# Patient Record
Sex: FEMALE | Race: BLACK OR AFRICAN AMERICAN | NOT HISPANIC OR LATINO | Employment: FULL TIME | URBAN - METROPOLITAN AREA
[De-identification: names, ages, dates, MRNs, and addresses within clinical notes are randomized per-mention and may not be internally consistent; named-entity substitution may affect disease eponyms.]

---

## 2023-01-05 ENCOUNTER — OFFICE VISIT (OUTPATIENT)
Dept: OBSTETRICS AND GYNECOLOGY | Facility: CLINIC | Age: 23
End: 2023-01-05
Payer: COMMERCIAL

## 2023-01-05 VITALS
WEIGHT: 164 LBS | HEIGHT: 68 IN | BODY MASS INDEX: 24.86 KG/M2 | SYSTOLIC BLOOD PRESSURE: 110 MMHG | DIASTOLIC BLOOD PRESSURE: 67 MMHG

## 2023-01-05 DIAGNOSIS — Z30.09 BIRTH CONTROL COUNSELING: ICD-10-CM

## 2023-01-05 DIAGNOSIS — Z20.2 POSSIBLE EXPOSURE TO STD: Primary | ICD-10-CM

## 2023-01-05 PROCEDURE — 99202 OFFICE O/P NEW SF 15 MIN: CPT | Performed by: OBSTETRICS & GYNECOLOGY

## 2023-01-05 NOTE — PROGRESS NOTES
AMBER Andres  is a 22 y.o. female who presents for 2 reasons.  First, she would like to be tested for sexually transmitted infections.  She is not symptomatic, but is sexually active.  Next, she has a Nexplanon in place.  It is 3 years old and the patient would like to discuss its removal.    Chief Complaint   Patient presents with   • New Gyn     Patient is here for a new gyn annual recheck nexplanon and STI screening.       History reviewed. No pertinent past medical history.    Past Surgical History:   Procedure Laterality Date   • SPINE SURGERY         Social History     Socioeconomic History   • Marital status: Single   Tobacco Use   • Smoking status: Never   • Smokeless tobacco: Never   Vaping Use   • Vaping Use: Never used   Substance and Sexual Activity   • Alcohol use: Not Currently   • Drug use: Never   • Sexual activity: Not Currently       The following portions of the patient's history were reviewed and updated as appropriate: allergies, current medications, past family history, past medical history, past social history, past surgical history and problem list.    Review of Systems      This is positive for irregular cycles.  It is negative for fever or chills.  Negative for nausea or vomiting.  Negative for abdominal or pelvic pain.    Physical Exam  Vitals and nursing note reviewed.   Constitutional:       Appearance: Normal appearance.   Abdominal:      General: There is no distension.      Palpations: Abdomen is soft.      Tenderness: There is no abdominal tenderness.   Genitourinary:     Comments: Normal female external genitalia  There are no vaginal lesions.  There is menstrual blood in the vault  The cervix is normal in appearance.  It is not tender to palpation.  Cultures performed.  The uterus is small and mobile within the pelvis.  It is nontender.  No adnexal masses are palpable  Neurological:      Mental Status: She is alert and oriented to person, place, and time.          Assessment    Diagnoses and all orders for this visit:    1. Possible exposure to STD (Primary)    2. Birth control counseling        Plan  1. Contraceptive counseling.  Once the patient's Nexplanon is removed, she would like to use condoms.  We discussed the benefits and risks of this.  Safe sex counseling was also undertaken.  2. Possible exposure to STDs.  Counseled and questions answered.  Cultures for gonorrhea, chlamydia and trichomonas were performed.  The patient declines blood work for syphilis or HIV.  3. The patient would like her Nexplanon to be removed.  I examined the patient.  Her Nexplanon is in good position in the right upper arm.  We discussed the procedure of Nexplanon removal and I answered the patient's questions.  She would like to proceed.  She will schedule this in the near future.    4. No follow-ups on file.    Social History     Tobacco Use   Smoking Status Never   Smokeless Tobacco Never   5.

## 2023-01-07 LAB
A VAGINAE DNA VAG QL NAA+PROBE: NORMAL SCORE
BVAB2 DNA VAG QL NAA+PROBE: NORMAL SCORE
C ALBICANS DNA VAG QL NAA+PROBE: NEGATIVE
C GLABRATA DNA VAG QL NAA+PROBE: NEGATIVE
C TRACH DNA VAG QL NAA+PROBE: NEGATIVE
MEGA1 DNA VAG QL NAA+PROBE: NORMAL SCORE
N GONORRHOEA DNA VAG QL NAA+PROBE: NEGATIVE
T VAGINALIS DNA VAG QL NAA+PROBE: NEGATIVE

## 2023-01-09 ENCOUNTER — PATIENT ROUNDING (BHMG ONLY) (OUTPATIENT)
Dept: OBSTETRICS AND GYNECOLOGY | Facility: CLINIC | Age: 23
End: 2023-01-09
Payer: COMMERCIAL

## 2023-01-09 ENCOUNTER — PATIENT MESSAGE (OUTPATIENT)
Dept: OBSTETRICS AND GYNECOLOGY | Facility: CLINIC | Age: 23
End: 2023-01-09
Payer: COMMERCIAL

## 2023-01-09 NOTE — PROGRESS NOTES
My chart message has been sent to the patient for [place] [facility]

## 2023-01-12 ENCOUNTER — PROCEDURE VISIT (OUTPATIENT)
Dept: OBSTETRICS AND GYNECOLOGY | Facility: CLINIC | Age: 23
End: 2023-01-12
Payer: COMMERCIAL

## 2023-01-12 VITALS
WEIGHT: 164 LBS | SYSTOLIC BLOOD PRESSURE: 110 MMHG | HEIGHT: 68 IN | BODY MASS INDEX: 24.86 KG/M2 | DIASTOLIC BLOOD PRESSURE: 64 MMHG

## 2023-01-12 DIAGNOSIS — Z30.46 NEXPLANON REMOVAL: ICD-10-CM

## 2023-01-12 PROCEDURE — 11982 REMOVE DRUG IMPLANT DEVICE: CPT | Performed by: OBSTETRICS & GYNECOLOGY

## 2023-01-12 NOTE — PROGRESS NOTES
AMBER Andres  is a 22 y.o. female who presents for removal of a Nexplanon.  She has been counseled regarding the procedure itself as well as its benefits, risks and alternatives.  She would like to proceed.    Chief Complaint   Patient presents with   • Contraception     Patient is here for nexplanon removal.       History reviewed. No pertinent past medical history.    Past Surgical History:   Procedure Laterality Date   • SPINE SURGERY         Social History     Socioeconomic History   • Marital status: Single   Tobacco Use   • Smoking status: Never   • Smokeless tobacco: Never   Vaping Use   • Vaping Use: Never used   Substance and Sexual Activity   • Alcohol use: Not Currently   • Drug use: Never   • Sexual activity: Not Currently       The following portions of the patient's history were reviewed and updated as appropriate: allergies, current medications, past family history, past medical history, past social history, past surgical history and problem list.    Review of Systems          Physical Exam  Vitals and nursing note reviewed.   Constitutional:       Appearance: Normal appearance.   Skin:     Comments: Nexplanon was removed from the patient's right upper arm.  Xylocaine was used for local anesthetic.  Sterile technique was observed.  An incision was made overlying the distal end of the Nexplanon.  A hemostat was advanced and the Nexplanon was grasped.  It was removed.  The Nexplanon was examined.  It was completely intact.  A pressure dressing was applied.  The patient tolerated the procedure well.   Neurological:      Mental Status: She is alert and oriented to person, place, and time.         Assessment    Diagnoses and all orders for this visit:    1. Nexplanon removal        Plan  1. Nexplanon was removed as described above.  2. The patient has a job which requires heavy lifting.  I advised her to be off work today.  3. The patient plans to use condoms if she is sexually active in the  future.    4. No follow-ups on file.    Social History     Tobacco Use   Smoking Status Never   Smokeless Tobacco Never   5.

## 2023-06-14 ENCOUNTER — TELEPHONE (OUTPATIENT)
Dept: OBSTETRICS AND GYNECOLOGY | Facility: CLINIC | Age: 23
End: 2023-06-14
Payer: COMMERCIAL

## 2023-09-26 ENCOUNTER — TELEPHONE (OUTPATIENT)
Dept: OBSTETRICS AND GYNECOLOGY | Facility: CLINIC | Age: 23
End: 2023-09-26
Payer: COMMERCIAL

## 2023-09-26 NOTE — TELEPHONE ENCOUNTER
Please contact the patient and let her know that I reviewed her chart.  Usually, a hemorrhagic cyst that ruptures brings about resolution of the pain almost immediately.  If she is still in pain, there could be a different diagnosis.  Please help the patient to schedule an appointment soon so we can assess this.  If her pain is severe, I would recommend that she come to the emergency room.

## 2023-09-26 NOTE — TELEPHONE ENCOUNTER
Provider: DR. BURNETT    Caller: JORDON DODGE    Relationship to Patient: SELF    Pharmacy: [facility] @ [address]    Phone Number: [phone]    Reason for Call: PT WENT TO [facility] ON 9-24 FOR AB PAIN. NOTES ARE IN CARE EVERYWHERE, THEY DID AN U/S AND A C/T AND ADV PT THAT SHE HAD HAD A RUPTURED OVARIAN CYST. SHE IS STILL IN PAIN 5 OUT OF 10 BUT ESPECIALLY WHEN SHE MOVES. CAN SOMEONE CALL HER ON PLAN OF CARE?    When was the patient last seen: 07-19-23

## 2023-09-29 ENCOUNTER — OFFICE VISIT (OUTPATIENT)
Dept: OBSTETRICS AND GYNECOLOGY | Facility: CLINIC | Age: 23
End: 2023-09-29
Payer: COMMERCIAL

## 2023-09-29 VITALS
SYSTOLIC BLOOD PRESSURE: 118 MMHG | HEIGHT: 68 IN | WEIGHT: 177 LBS | DIASTOLIC BLOOD PRESSURE: 67 MMHG | BODY MASS INDEX: 26.83 KG/M2

## 2023-09-29 DIAGNOSIS — N83.209 RUPTURED OVARIAN CYST: Primary | ICD-10-CM

## 2023-09-29 NOTE — PROGRESS NOTES
AMBER Andres  is a 23 y.o. female who presents for follow-up of a ruptured ovarian cyst.  The patient is not currently using any form of contraception.  Last week, she experienced pelvic and suprapubic pain.  It eventually became unbearable and she presented to the emergency room.  I have reviewed the emergency room record and discussed it with the patient.  Hemoglobin and white cell count were normal.  Ultrasound and CT scan were performed.  These imaging studies revealed a ruptured cyst of the ovary with no other pathology encountered.  Since the patient's emergency room visit, she has complete resolution of her pain.  She is sexually active and is not using contraception.  She does not wish to become pregnant.  Her pregnancy test in the emergency room was negative.    Chief Complaint   Patient presents with    Follow-up     Patient is here for a f/u for a ruptured cyst.       History reviewed. No pertinent past medical history.    Past Surgical History:   Procedure Laterality Date    SPINE SURGERY         Social History     Socioeconomic History    Marital status: Single   Tobacco Use    Smoking status: Never    Smokeless tobacco: Never   Vaping Use    Vaping Use: Never used   Substance and Sexual Activity    Alcohol use: Not Currently    Drug use: Never    Sexual activity: Not Currently       The following portions of the patient's history were reviewed and updated as appropriate: allergies, current medications, past family history, past medical history, past social history, past surgical history and problem list.    Review of Systems     This is positive for abdominal and pelvic pain, now resolved.  Negative for nausea or vomiting.  Negative for fever or chills.    Physical Exam  Vitals and nursing note reviewed.   Constitutional:       Appearance: Normal appearance.   Abdominal:      General: There is no distension.      Palpations: Abdomen is soft. There is no mass.      Tenderness: There is no  abdominal tenderness.   Genitourinary:     Comments: Normal female external genitalia  The vagina is estrogenized and without lesion.  There is no blood in the vaginal vault.  The cervix is normal in appearance.  There is no cervical motion tenderness.  The uterus is mobile within the pelvis.  It is normal in size.  It is nontender.  No adnexal tenderness is palpable  Neurological:      Mental Status: She is alert and oriented to person, place, and time.       Assessment    Diagnoses and all orders for this visit:    1. Ruptured ovarian cyst (Primary)        Plan  I counseled the patient regarding the pathophysiology of a ruptured ovarian cyst.  We reviewed a diagram together and I answered the patient's questions.  It appears that her ruptured ovarian cyst has resolved.  We discussed the benefits and risks of suppressing the ovary with oral contraceptive pills.  I explained my rationale for recommending this as a form of ovarian suppression as well as contraception.  The patient declines this for now, but will consider.  She will call me if she would like to begin treatment.  She understands that she is at risk of becoming pregnant and prefers not to do so.  30 minutes were spent reviewing the patient's emergency room record and counseling her regarding findings and options for management.    No follow-ups on file.    Social History     Tobacco Use   Smoking Status Never   Smokeless Tobacco Never

## 2023-10-30 ENCOUNTER — INITIAL PRENATAL (OUTPATIENT)
Dept: OBSTETRICS AND GYNECOLOGY | Facility: CLINIC | Age: 23
End: 2023-10-30
Payer: COMMERCIAL

## 2023-10-30 VITALS — WEIGHT: 178.2 LBS | DIASTOLIC BLOOD PRESSURE: 90 MMHG | SYSTOLIC BLOOD PRESSURE: 136 MMHG | BODY MASS INDEX: 27.1 KG/M2

## 2023-10-30 DIAGNOSIS — Z3A.01 LESS THAN 8 WEEKS GESTATION OF PREGNANCY: Primary | ICD-10-CM

## 2023-10-30 LAB
B-HCG UR QL: POSITIVE
EXPIRATION DATE: ABNORMAL
EXTERNAL HEPATITIS B SURFACE ANTIGEN: NEGATIVE
EXTERNAL HEPATITIS C AB: NON REACTIVE
EXTERNAL RUBELLA QUALITATIVE: NORMAL
EXTERNAL SYPHILIS RPR SCREEN: NORMAL
GLUCOSE UR STRIP-MCNC: NEGATIVE MG/DL
HIV1 P24 AG SERPL QL IA: NORMAL
INTERNAL NEGATIVE CONTROL: ABNORMAL
INTERNAL POSITIVE CONTROL: ABNORMAL
Lab: ABNORMAL
PROT UR STRIP-MCNC: ABNORMAL MG/DL

## 2023-10-30 RX ORDER — PRENATAL VIT NO.126/IRON/FOLIC 28MG-0.8MG
TABLET ORAL DAILY
COMMUNITY

## 2023-10-30 RX ORDER — DIPHENHYDRAMINE HYDROCHLORIDE 25 MG/1
25 CAPSULE ORAL 3 TIMES DAILY
Qty: 90 TABLET | Refills: 3 | Status: SHIPPED | OUTPATIENT
Start: 2023-10-30

## 2023-10-30 NOTE — PROGRESS NOTES
CC: Initial obstetric visit    HPI: 23-year-old  1 para 0 presents at 8-0/7 weeks by today's ultrasound and confirmed by her last menstrual cycle for her initial obstetric visit.  She has some nausea and occasional episodes of vomiting, but is otherwise feeling well.  Her history is significant for Garcia maryanne placement at age 18.  Also, the patient has been using Dupixent for psoriasis.    Review of systems    This is positive for nausea.  Negative for fever or chills.  Negative for abdominal or pelvic pain.  Negative for vaginal bleeding.  All other systems are reviewed and are negative.    Assessment and plan    1.  Intrauterine pregnancy at 8-0/7 weeks  2.  Prenatal counseling was undertaken and questions were answered.  3.  Counseled regarding ACOG recommendations for the influenza vaccine in pregnancy.  The patient would like to proceed today.  4.  Counseled regarding genetic screening.  The patient would like to do noninvasive prenatal screening when it is gestational age-appropriate.  5.  Garcia maryanne placement as a teenager.  While I doubt that there will be any limitations, I recommend that the patient follow-up with the spine center for further recommendations.  The patient plans to do so.  6.  Counseled regarding benefits and risks of continuing Dupixent during pregnancy.  For now, she has discontinued it for the last 4 weeks.  She will follow-up with dermatology to determine if any psoriasis treatment will be required during her pregnancy.

## 2023-10-31 LAB
ABO GROUP BLD: ABNORMAL
AMPHETAMINES UR QL SCN: NEGATIVE NG/ML
BARBITURATES UR QL SCN: NEGATIVE NG/ML
BASOPHILS # BLD AUTO: 0 X10E3/UL (ref 0–0.2)
BASOPHILS NFR BLD AUTO: 0 %
BENZODIAZ UR QL: NEGATIVE NG/ML
BLD GP AB SCN SERPL QL: NEGATIVE
BZE UR QL: NEGATIVE NG/ML
CANNABINOIDS UR QL SCN: NEGATIVE NG/ML
EOSINOPHIL # BLD AUTO: 0.1 X10E3/UL (ref 0–0.4)
EOSINOPHIL NFR BLD AUTO: 1 %
ERYTHROCYTE [DISTWIDTH] IN BLOOD BY AUTOMATED COUNT: 14.1 % (ref 11.7–15.4)
HBV SURFACE AG SERPL QL IA: NEGATIVE
HCT VFR BLD AUTO: 37.4 % (ref 34–46.6)
HCV IGG SERPL QL IA: NON REACTIVE
HGB BLD-MCNC: 12.3 G/DL (ref 11.1–15.9)
HGB S BLD QL SOLY: NEGATIVE
HIV 1+2 AB+HIV1 P24 AG SERPL QL IA: NON REACTIVE
IMM GRANULOCYTES # BLD AUTO: 0 X10E3/UL (ref 0–0.1)
IMM GRANULOCYTES NFR BLD AUTO: 0 %
LYMPHOCYTES # BLD AUTO: 1 X10E3/UL (ref 0.7–3.1)
LYMPHOCYTES NFR BLD AUTO: 11 %
MCH RBC QN AUTO: 26.5 PG (ref 26.6–33)
MCHC RBC AUTO-ENTMCNC: 32.9 G/DL (ref 31.5–35.7)
MCV RBC AUTO: 81 FL (ref 79–97)
METHADONE UR QL SCN: NEGATIVE NG/ML
MONOCYTES # BLD AUTO: 0.7 X10E3/UL (ref 0.1–0.9)
MONOCYTES NFR BLD AUTO: 8 %
NEUTROPHILS # BLD AUTO: 7.2 X10E3/UL (ref 1.4–7)
NEUTROPHILS NFR BLD AUTO: 80 %
OPIATES UR QL: NEGATIVE NG/ML
PCP UR QL SCN: NEGATIVE NG/ML
PLATELET # BLD AUTO: 290 X10E3/UL (ref 150–450)
PROPOXYPH UR QL SCN: NEGATIVE NG/ML
RBC # BLD AUTO: 4.64 X10E6/UL (ref 3.77–5.28)
RH BLD: POSITIVE
RPR SER QL: NON REACTIVE
RUBV IGG SERPL IA-ACNC: 6.48 INDEX
WBC # BLD AUTO: 9 X10E3/UL (ref 3.4–10.8)

## 2023-11-01 LAB
BACTERIA UR CULT: NO GROWTH
BACTERIA UR CULT: NORMAL

## 2023-11-02 LAB
C TRACH RRNA CVX QL NAA+PROBE: NEGATIVE
CYTOLOGIST CVX/VAG CYTO: ABNORMAL
CYTOLOGY CVX/VAG DOC CYTO: ABNORMAL
CYTOLOGY CVX/VAG DOC THIN PREP: ABNORMAL
DX ICD CODE: ABNORMAL
DX ICD CODE: ABNORMAL
HIV 1 & 2 AB SER-IMP: ABNORMAL
HPV I/H RISK 4 DNA CVX QL PROBE+SIG AMP: POSITIVE
N GONORRHOEA RRNA CVX QL NAA+PROBE: NEGATIVE
OTHER STN SPEC: ABNORMAL
PATHOLOGIST CVX/VAG CYTO: ABNORMAL
RECOM F/U CVX/VAG CYTO: ABNORMAL
STAT OF ADQ CVX/VAG CYTO-IMP: ABNORMAL
T VAGINALIS RRNA SPEC QL NAA+PROBE: NEGATIVE

## 2023-11-06 ENCOUNTER — TELEPHONE (OUTPATIENT)
Dept: OBSTETRICS AND GYNECOLOGY | Facility: CLINIC | Age: 23
End: 2023-11-06
Payer: COMMERCIAL

## 2023-11-09 NOTE — TELEPHONE ENCOUNTER
Please contact the patient and let her know that her Pap smear showed a low-grade abnormality of the cervix.  Please help her to schedule a colposcopy.  Thank you.

## 2023-11-28 ENCOUNTER — ROUTINE PRENATAL (OUTPATIENT)
Dept: OBSTETRICS AND GYNECOLOGY | Facility: CLINIC | Age: 23
End: 2023-11-28
Payer: COMMERCIAL

## 2023-11-28 VITALS — BODY MASS INDEX: 27.46 KG/M2 | DIASTOLIC BLOOD PRESSURE: 67 MMHG | WEIGHT: 180.6 LBS | SYSTOLIC BLOOD PRESSURE: 118 MMHG

## 2023-11-28 DIAGNOSIS — Z3A.11 11 WEEKS GESTATION OF PREGNANCY: Primary | ICD-10-CM

## 2023-11-28 PROCEDURE — 0502F SUBSEQUENT PRENATAL CARE: CPT | Performed by: OBSTETRICS & GYNECOLOGY

## 2023-11-28 NOTE — PROGRESS NOTES
CC: Obstetric visit    HPI: 23-year-old  1 para 0 presents at 11-5/7 weeks for her obstetric visit.  She reports that her nausea has improved considerably.  She has no complaints today.    Assessment and plan    1.  Intrauterine pregnancy at 11-5/7 weeks  2.  Ultrasound today regarding unable to Doppler fetal heart tones.  3.  Counseled regarding prenatal screening.  The patient would like to proceed with noninvasive prenatal screening today.

## 2023-12-02 LAB
CFDNA.FET/CFDNA.TOTAL SFR FETUS: NORMAL %
CITATION REF LAB TEST: NORMAL
FET 13+18+21+X+Y ANEUP PLAS.CFDNA: NEGATIVE
FET CHR 21 TS PLAS.CFDNA QL: NEGATIVE
FET SEX PLAS.CFDNA DOSAGE CFDNA: NORMAL
FET TS 13 RISK PLAS.CFDNA QL: NEGATIVE
FET TS 18 RISK WBC.DNA+CFDNA QL: NEGATIVE
GA EST FROM CONCEPTION DATE: NORMAL D
GESTATIONAL AGE > 9:: YES
LAB DIRECTOR NAME PROVIDER: NORMAL
LAB DIRECTOR NAME PROVIDER: NORMAL
LABORATORY COMMENT REPORT: NORMAL
LIMITATIONS OF THE TEST: NORMAL
NEGATIVE PREDICTIVE VALUE: NORMAL
NOTE: NORMAL
PERFORMANCE CHARACTERISTICS: NORMAL
POSITIVE PREDICTIVE VALUE: NORMAL
REF LAB TEST METHOD: NORMAL
TEST PERFORMANCE INFO SPEC: NORMAL

## 2023-12-12 LAB
CFTR MUT ANL BLD/T: NORMAL
LABORATORY COMMENT REPORT: NORMAL

## 2023-12-26 ENCOUNTER — DOCUMENTATION (OUTPATIENT)
Dept: OBSTETRICS AND GYNECOLOGY | Facility: CLINIC | Age: 23
End: 2023-12-26

## 2023-12-26 ENCOUNTER — TELEPHONE (OUTPATIENT)
Dept: OBSTETRICS AND GYNECOLOGY | Facility: CLINIC | Age: 23
End: 2023-12-26

## 2024-01-09 ENCOUNTER — HOSPITAL ENCOUNTER (EMERGENCY)
Facility: HOSPITAL | Age: 24
Discharge: HOME | End: 2024-01-09
Attending: OBSTETRICS & GYNECOLOGY | Admitting: OBSTETRICS & GYNECOLOGY
Payer: COMMERCIAL

## 2024-01-09 VITALS
HEIGHT: 68 IN | WEIGHT: 192 LBS | BODY MASS INDEX: 29.1 KG/M2 | TEMPERATURE: 99.3 F | OXYGEN SATURATION: 99 % | HEART RATE: 89 BPM | DIASTOLIC BLOOD PRESSURE: 63 MMHG | SYSTOLIC BLOOD PRESSURE: 136 MMHG | RESPIRATION RATE: 16 BRPM

## 2024-01-09 LAB
BILIRUB UR QL STRIP: NEGATIVE
CLARITY UR: ABNORMAL
COLOR UR: YELLOW
GLUCOSE BLDC GLUCOMTR-MCNC: 79 MG/DL (ref 70–130)
GLUCOSE UR STRIP-MCNC: ABNORMAL MG/DL
HGB UR QL STRIP.AUTO: NEGATIVE
KETONES UR QL STRIP: ABNORMAL
LEUKOCYTE ESTERASE UR QL STRIP.AUTO: NEGATIVE
NITRITE UR QL STRIP: NEGATIVE
PH UR STRIP.AUTO: 6.5 [PH] (ref 5–8)
PROT UR QL STRIP: NEGATIVE
SP GR UR STRIP: 1.02 (ref 1–1.03)
UROBILINOGEN UR QL STRIP: ABNORMAL

## 2024-01-09 PROCEDURE — 82948 REAGENT STRIP/BLOOD GLUCOSE: CPT

## 2024-01-09 PROCEDURE — 99282 EMERGENCY DEPT VISIT SF MDM: CPT | Performed by: OBSTETRICS & GYNECOLOGY

## 2024-01-09 PROCEDURE — 87086 URINE CULTURE/COLONY COUNT: CPT | Performed by: OBSTETRICS & GYNECOLOGY

## 2024-01-09 PROCEDURE — 81003 URINALYSIS AUTO W/O SCOPE: CPT | Performed by: OBSTETRICS & GYNECOLOGY

## 2024-01-10 LAB — BACTERIA SPEC AEROBE CULT: NO GROWTH

## 2024-01-10 NOTE — OBED NOTES
"[facility]  Abdon Andres  : 2000  MRN: [id number]  CSN: [id number]    OB ED Provider Note    Subjective   Chief Complaint   Patient presents with    Abdominal Pain     Oscar- lower abdominal pain that started 24 hrs ago, consistent pain that pt is rating 3/10 pain feels like a cramp sensation, pain gets worse when sitting up straight. Denies recent intercourse and denies lof vb     Abdon Andres is a 23 y.o. year old  with an Estimated Date of Delivery: 24 currently at 17w5d presenting with 1 day history of intermittent BLQ cramping type pain. Her pain worsens with ambulation and trunk flexion, improving with laying flat. She reports the pain is worse in the RLQ today, but it is still present in LLQ. No ROM or VB.     Prenatal care has been with Dr. Moeller.  It has been benign.    OB History    Para Term  AB Living   1 0 0 0 0 0   SAB IAB Ectopic Molar Multiple Live Births   0 0 0 0 0 0      # Outcome Date GA Lbr Cristino/2nd Weight Sex Delivery Anes PTL Lv   1 Current              No past medical history on file.  Past Surgical History:   Procedure Laterality Date    SPINE SURGERY       No current facility-administered medications for this encounter.    Allergies   Allergen Reactions    Other Rash     UTI medication (prescribed by hospital)     Social History    Tobacco Use      Smoking status: Never      Smokeless tobacco: Never    Review of Systems   Gastrointestinal:  Positive for abdominal pain.   All other systems reviewed and are negative.        Objective   /62 (BP Location: Right arm, Patient Position: Sitting)   Pulse 96   Temp 99.3 °F (37.4 °C) (Oral)   Resp 16   Ht 172.7 cm (68\")   Wt 87.1 kg (192 lb)   LMP 2023   SpO2 99%   BMI 29.19 kg/m²   General: well developed; well nourished  no acute distress   Abdomen: soft, 1+ BLQ tendernes over insertion of round ligaments; no masses  gravid    FHT's: 150      Cervix: was not checked. "   Presentation: Unable to appreciate   Contractions: Not monitored   Chest: Unlabored respirations    CV:  RRR   Ext:   No C/C/E   Back: CVA tenderness is deferred bilateral        Prenatal Labs  Lab Results   Component Value Date    HGB 12.3 10/30/2023    RUBELLAABIGG 6.48 10/30/2023    HEPBSAG Negative 10/30/2023    ABSCRN Negative 10/30/2023    NKN5WHY7 Non Reactive 10/30/2023    HEPCVIRUSABY Non Reactive 10/30/2023    URINECX Final report 10/30/2023    CHLAMNAA Negative 10/30/2023    NGONORRHON Negative 10/30/2023       Current Labs Reviewed   UA:    Lab Results   Component Value Date    SPECGRAVUR 1.024 01/09/2024    KETONESU Trace (A) 01/09/2024    BLOODU Negative 01/09/2024    LEUKOCYTESUR Negative 01/09/2024    NITRITEU Negative 01/09/2024     Accucheck 79       Assessment   IUP at 17w5d  Round ligament pain- location of pain in BLQs, exacerbating and alleviating factors are classic for round ligament pain   Glycosuria- normal accucheck     Plan   D/C home with instructions to return for worsening symptoms, acute changes.    Kylie Black MD  1/9/2024  20:41 EST

## 2024-01-15 ENCOUNTER — ROUTINE PRENATAL (OUTPATIENT)
Dept: OBSTETRICS AND GYNECOLOGY | Facility: CLINIC | Age: 24
End: 2024-01-15
Payer: COMMERCIAL

## 2024-01-15 VITALS — SYSTOLIC BLOOD PRESSURE: 128 MMHG | DIASTOLIC BLOOD PRESSURE: 77 MMHG | WEIGHT: 194.4 LBS | BODY MASS INDEX: 29.56 KG/M2

## 2024-01-15 DIAGNOSIS — Z3A.18 18 WEEKS GESTATION OF PREGNANCY: Primary | ICD-10-CM

## 2024-01-15 DIAGNOSIS — Z13.89 SCREENING FOR BLOOD OR PROTEIN IN URINE: ICD-10-CM

## 2024-01-15 RX ORDER — FLUTICASONE PROPIONATE 0.05 MG/G
OINTMENT TOPICAL
COMMUNITY
Start: 2024-01-10

## 2024-01-15 NOTE — PROGRESS NOTES
CC: Obstetric visit    HPI: 23-year-old  1 para 0 presents at 18-4/7 weeks for her obstetric visit.  She has not yet appreciated fetal movement.  Her nausea and vomiting have resolved.  She had an ER visit for round ligament pain, but this has resolved as well.    Review of systems    This is negative for abdominal or pelvic pain.  Negative for vaginal bleeding.  Negative for nausea or vomiting.    Assessment and plan    1.  Intrauterine pregnancy at 18-4/7 weeks  2.  Free fetal DNA testing is negative.  Counseled.  Counseled regarding AFP testing for spina bifida.  The patient would like to proceed.  This has been ordered.  3.  Screening ultrasound at the next visit.

## 2024-01-16 LAB
GLUCOSE UR STRIP-MCNC: NEGATIVE MG/DL
PROT UR STRIP-MCNC: NEGATIVE MG/DL

## 2024-01-17 LAB
AFP INTERP SERPL-IMP: NORMAL
AFP INTERP SERPL-IMP: NORMAL
AFP MOM SERPL: 1.33
AFP SERPL-MCNC: 59.5 NG/ML
AGE AT DELIVERY: 21.7 YR
GA METHOD: NORMAL
GA: 18.6 WEEKS
IDDM PATIENT QL: NO
LABORATORY COMMENT REPORT: NORMAL
MULTIPLE PREGNANCY: NO
NEURAL TUBE DEFECT RISK FETUS: 8798 %
RESULT: NORMAL

## 2024-01-24 ENCOUNTER — TELEPHONE (OUTPATIENT)
Dept: OBSTETRICS AND GYNECOLOGY | Facility: CLINIC | Age: 24
End: 2024-01-24
Payer: COMMERCIAL

## 2024-01-24 NOTE — TELEPHONE ENCOUNTER
----- Message from Donovan Trimble sent at 1/24/2024 11:58 AM EST -----  Regarding: FW: .  Contact: [phone]  Do you know if she is referring to her OB estimate?   Wasn't sure if she was given one already.     Tutu Mann   ----- Message -----  From: Abdon Andres  Sent: 1/24/2024  10:21 AM EST  To: [facility]  Subject: .                                                To pay for my birth plan it’s not showing up on here so I can pay for it

## 2024-01-24 NOTE — TELEPHONE ENCOUNTER
Yes I already did her Ob estimate back in October and she signed it. It usually shows up on my chart and they can pay from there, if she can't find it she'll need to contact the help desk or can pay in office.

## 2024-02-07 ENCOUNTER — DOCUMENTATION (OUTPATIENT)
Dept: OBSTETRICS AND GYNECOLOGY | Facility: CLINIC | Age: 24
End: 2024-02-07

## 2024-02-07 ENCOUNTER — ROUTINE PRENATAL (OUTPATIENT)
Dept: OBSTETRICS AND GYNECOLOGY | Facility: CLINIC | Age: 24
End: 2024-02-07
Payer: COMMERCIAL

## 2024-02-07 VITALS — SYSTOLIC BLOOD PRESSURE: 145 MMHG | WEIGHT: 198.8 LBS | BODY MASS INDEX: 30.23 KG/M2 | DIASTOLIC BLOOD PRESSURE: 74 MMHG

## 2024-02-07 DIAGNOSIS — O26.892 LOW BACK PAIN DURING PREGNANCY, SECOND TRIMESTER: ICD-10-CM

## 2024-02-07 DIAGNOSIS — Z3A.21 21 WEEKS GESTATION OF PREGNANCY: Primary | ICD-10-CM

## 2024-02-07 DIAGNOSIS — M54.50 LOW BACK PAIN DURING PREGNANCY, SECOND TRIMESTER: ICD-10-CM

## 2024-02-07 LAB
CLARITY, POC: CLEAR
COLOR UR: YELLOW
GLUCOSE UR STRIP-MCNC: NEGATIVE MG/DL
LEUKOCYTE EST, POC: NEGATIVE
NITRITE UR-MCNC: POSITIVE MG/ML
PROT UR STRIP-MCNC: ABNORMAL MG/DL

## 2024-02-07 RX ORDER — CEPHALEXIN 500 MG/1
500 CAPSULE ORAL 3 TIMES DAILY
Qty: 21 CAPSULE | Refills: 0 | Status: SHIPPED | OUTPATIENT
Start: 2024-02-07 | End: 2024-02-14

## 2024-02-07 NOTE — PROGRESS NOTES
On further review of the patient's chart, she reports an allergy to a medicine prescribed at a hospital for a urinary tract infection which caused hives.  The patient does not know what medication it was and it was many years ago.  She does not remember where the prescription was prescribed.  We discussed the risks associated with medicines for UTI.  I have prescribed Keflex today.  The patient reports that the medicine she took that gave her the reaction was used twice daily.  She does not feel that we will be able to find the medicine that she is allergic to.  She plans to try the Keflex and stop immediately if she has any adverse effects.

## 2024-02-07 NOTE — PROGRESS NOTES
CC: Obstetric visit    HPI: 23-year-old  1 para 0 presents at 21-6/7 weeks for her obstetric visit.  She has begun to appreciate fetal movement.  She does complain of some low back pain.  She is concerned because she has a history of placement of Garcia rods in the past.    Physical examination    The abdomen is soft and gravid.  There is no epigastric tenderness.  No fundal tenderness.  No suprapubic tenderness.  No tenderness on palpation of the spine  Extremities are equal in size    Assessment and plan    1.  Intrauterine pregnancy at 21-6/7 weeks  2.  Screening ultrasound seen  3.  Low back pain in pregnancy.  Counseled and questions answered.  The patient also has Garcia rods.  I recommend physical therapy and the patient agrees.  A referral has been sent.  4.  1 hour glucose tolerance test at the next visit.

## 2024-02-20 ENCOUNTER — HOSPITAL ENCOUNTER (OUTPATIENT)
Dept: PHYSICAL THERAPY | Facility: HOSPITAL | Age: 24
Setting detail: THERAPIES SERIES
Discharge: HOME | End: 2024-02-20
Payer: COMMERCIAL

## 2024-02-20 DIAGNOSIS — O26.892 PELVIC PAIN AFFECTING PREGNANCY IN SECOND TRIMESTER, ANTEPARTUM: ICD-10-CM

## 2024-02-20 DIAGNOSIS — O26.899 PREGNANCY RELATED LOW BACK PAIN, ANTEPARTUM: Primary | ICD-10-CM

## 2024-02-20 DIAGNOSIS — M54.50 PREGNANCY RELATED LOW BACK PAIN, ANTEPARTUM: Primary | ICD-10-CM

## 2024-02-20 DIAGNOSIS — R10.2 PELVIC PAIN AFFECTING PREGNANCY IN SECOND TRIMESTER, ANTEPARTUM: ICD-10-CM

## 2024-02-20 PROCEDURE — 97161 PT EVAL LOW COMPLEX 20 MIN: CPT

## 2024-02-20 PROCEDURE — 97530 THERAPEUTIC ACTIVITIES: CPT

## 2024-02-20 NOTE — THERAPY EVALUATION
Outpatient Physical Therapy Ortho Initial Evaluation  [facility]     Patient Name: Abdon Andrse  : 2000  MRN: [id number]  Today's Date: 2024      Visit Date: 2024    There is no problem list on file for this patient.       No past medical history on file.     Past Surgical History:   Procedure Laterality Date    SPINE SURGERY         Visit Dx:     ICD-10-CM ICD-9-CM   1. Pregnancy related low back pain, antepartum  O26.899 646.83    M54.50 724.2   2. Pelvic pain affecting pregnancy in second trimester, antepartum  O26.892 646.83    R10.2 625.9          Patient History       Row Name 24 1400 24 1657          History    Chief Complaint -- Difficulty Walking;Difficulty with daily activities;Fatigue/poor endurance;Joint stiffness;Pain;Problems breathing/shortness of breath (P)    -patient     Type of Pain -- Ankle pain;Back pain;Foot pain;Hip pain;Knee pain;Lower Extremity / Leg;Neck pain;Rib pain;Shoulder pain (P)    -patient     Patient/Caregiver Goals -- Relieve pain;Improve mobility;Know what to do to help the symptoms (P)    -patient     Hand Dominance -- left-handed (P)    -patient     Are you or can you be pregnant -- Yes (P)    -patient        Fall Risk Assessment    Any falls in the past year: No  -RS No (P)    -patient        Services    Prior Rehab/Home Health Experiences Yes  -RS Yes (P)    -patient     Are you currently receiving Home Health services No  -RS No (P)    -patient     Do you plan to receive Home Health services in the near future No  -RS No (P)    -patient        Daily Activities    Primary Language English  -RS English (P)    -patient     Are you able to read Yes  -RS Yes (P)    -patient     Are you able to write Yes  -RS Yes (P)    -patient     Pt Participated in POC and Goals Yes  -RS --        Safety    Are you being hurt, hit, or frightened by anyone at home or in your life? No  -RS No (P)    -patient     Are you being neglected by a caregiver No   -RS No (P)    -patient     Have you had any of the following issues with N/A  -RS N/A (P)    -patient               User Key  (r) = Recorded By, (t) = Taken By, (c) = Cosigned By      Initials Name Provider Type    RS Salma Goodman, PT Physical Therapist    patient Abdon Andres --                     PT Ortho       Row Name 02/20/24 1400       Posture/Observations    Alignment Options Lumbar lordosis;Rounded shoulders  -RS    Rounded Shoulders Increased  -RS    Lumbar lordosis Mild;Increased  -RS    Posture/Observations Comments guarded with transitions, increased lateral trunk lean, decreased step length, doming of abdomen noted during transitional positions  -RS              User Key  (r) = Recorded By, (t) = Taken By, (c) = Cosigned By      Initials Name Provider Type    RS Salma Goodman, PT Physical Therapist                             Pelvic Health       Row Name 02/20/24 1400             Subjective    Brief Description of Chief Complaint The pt is a 22 yo female who presents with pregnancy related back, hip, and pelvis pain. She is currently 23w 5d pregnant with her first child.  She reports history of back pain, she fell out of a window at 18 and had llanes rods placed, that has  been made worse since pregnancy. She experiences pain into her legs with prolonged standing and sitting. She reports pain and feeling out of breath with walking and stairs. She works in a [employer] center and has to be sitting/stationary most of the day. She did not exercise prior to pregnancy. She has done PT for her back in the past after surgery. She feels like she is going to thee bathroom more often than usual, she is going every couple hours but feels the urge to go more often (every hour). She has had one episode of JANICE in the past. She has pain with penetration that began with pregnancy and reports pain in her pubic symphysis.  -RS         Fall Risk Assessment    Any falls in the past year: No  -RS          Services    Prior Rehab/Home Health Experiences Yes  -RS      Are you currently receiving Home Health services No  -RS      Do you plan to receive Home Health services in the near future No  -RS         Daily Activities    Primary Language English  -RS      Are you able to read Yes  -RS      Are you able to write Yes  -RS      Pt Participated in POC and Goals Yes  -RS         Safety    Are you being hurt, hit, or frightened by anyone at home or in your life? No  -RS      Are you being neglected by a caregiver No  -RS      Have you had any of the following issues with N/A  -RS         Urinary/Bowel History    Difficulty starting stream no  -RS      Stress incontinence yes  -RS      Urgency yes  -RS         Pregnancy/Sexual History    Number of Pregnancies 1  -RS      Number of Children 0  -RS      How many weeks pregnant are you? 23 weeks  -RS      Do you have radicular pain or numbness? No  -RS      Are you currently exercising? No  -RS      Currently breastfeeding? No  -RS      Pain with initial penetration Yes  -RS      Pain with deep penetration Yes  -RS         Lumbar/SI Special Tests    SLR (Neural Tension) Bilateral:;Negative  -RS         Lumbosacral Palpation    Piriformis Bilateral:;Left:;Tender  -RS      Erector Spinae (Paraspinals) Bilateral:;Tender;Guarded/taut  -RS      Pubic Symphysis Bilateral:;Tender  -RS         Outcome Measures    Outcome Measure Options Pregnancy Mobility Index  -RS         Pregnancy Mobility Index    Total 26  -RS         MMT (Manual Muscle Testing)    Rt Lower Ext Rt Hip Flexion;Rt Hip ABduction;Rt Knee Extension;Rt Knee Flexion;Rt Hip External (Lateral) Rotation;Rt Hip Internal (Medial) Rotation;Rt Ankle Plantarflexion;Rt Ankle Dorsiflexion  -RS      Lt Lower Ext Lt Hip Flexion;Lt Hip ABduction;Lt Hip Internal (Medial) Rotation;Lt Hip External (Lateral) Rotation;Lt Knee Extension;Lt Knee Flexion;Lt Ankle Plantarflexion;Lt Ankle Dorsiflexion  -RS         MMT Right Lower Ext    Rt  Hip Flexion MMT, Gross Movement (4/5) good  -RS      Rt Hip ABduction MMT, Gross Movement (3+/5) fair plus  -RS      Rt Hip Internal (Medial) Rotation MMT, Gross Movement (3+/5) fair plus  -RS      Rt Hip External (Lateral) Rotation MMT, Gross Movement (4-/5) good minus  -RS      Rt Knee Extension MMT, Gross Movement (4/5) good  -RS      Rt Knee Flexion MMT, Gross Movement (4/5) good  -RS      Rt Ankle Plantarflexion MMT, Gross Movement (4/5) good  -RS      Rt Ankle Dorsiflexion MMT, Gross Movement (4/5) good  -RS         MMT Left Lower Ext    Lt Hip Flexion MMT, Gross Movement (4/5) good  -RS      Lt Hip ABduction MMT, Gross Movement (3+/5) fair plus  -RS      Lt Hip Internal (Medial) Rotation MMT, Gross Movement (4-/5) good minus  -RS      Lt Hip External (Lateral) Rotation MMT, Gross Movement (4-/5) good minus  -RS      Lt Knee Extension MMT, Gross Movement (4/5) good  -RS      Lt Knee Flexion MMT, Gross Movement (4/5) good  -RS      Lt Ankle Plantarflexion MMT, Gross Movement (4/5) good  -RS      Lt Ankle Dorsiflexion MMT, Gross Movement (4/5) good  -RS                User Key  (r) = Recorded By, (t) = Taken By, (c) = Cosigned By      Initials Name Provider Type    RS Salma Goodman, PT Physical Therapist                           PT OP Goals       Row Name 02/20/24 1400          PT Short Term Goals    STG Date to Achieve 04/05/24  -RS     STG 1 The pt will demonstrate IND with transitional position performance without pain greater than 4/10 to facilitate improved bed mobility.  -RS     STG 1 Progress New  -RS     STG 2 The pt will demonstrate IND and compliant with HEP focused on lumbopelvic stability and pain management.  -RS     STG 2 Progress New  -RS        Long Term Goals    LTG Date to Achieve 05/20/24  -RS     LTG 1 The pt will report understanding of labor prep from a pelvic floor perspective.  -RS     LTG 1 Progress New  -RS     LTG 2 The pt will report at most 4/10 back pain at the end of a work  day to facilitate improved work performance.  -RS     LTG 2 Progress New  -RS     LTG 3 The pt will report at least 50% reduction in LE pain with prolonged positioning with use of HEP and compressionsocks to facilitate improved activity tolerance.  -RS     LTG 3 Progress New  -RS     LTG 4 The pt will report no JANICE with laugh/cough with use of knack for improved pressure management.  -RS     LTG 4 Progress New  -RS        Time Calculation    PT Goal Re-Cert Due Date 05/20/24  -RS               User Key  (r) = Recorded By, (t) = Taken By, (c) = Cosigned By      Initials Name Provider Type    RS Salma Goodman, PT Physical Therapist                     PT Assessment/Plan       Row Name 02/20/24 1400          PT Assessment    Functional Limitations Impaired gait;Limitation in home management;Limitations in community activities;Performance in leisure activities;Performance in self-care ADL;Performance in work activities  -RS     Impairments Balance;Impaired muscle length;Impaired muscle power;Impaired muscle endurance;Range of motion;Posture;Peripheral nerve integrity;Pain;Muscle strength  -RS     Assessment Comments Abdon Andres is a 23 y.o. female referred to physical therapy for pregnancy related low back pain. She presents with a stable clinical presentation, along with no remarkable comorbidities and personal factors of history vertebral fractures and llanes maryanne placement at 19 yo that may impact her progress in the plan of care. Pt presents today with decreased lumbar mobility, decreased lateral hip strength, impaired single limb stability, TTP B erector spinae . her signs and symptoms are consistent with referring diagnosis. The previous impairments limit her ability to perform work duties, sleep without pain, perform transitional positions, navigate stairs. The pt self scores 26 on the pregnancy mobility index.Pt will benefit from skilled PT to address the previous impairments and return to PLOF.   -RS     Please refer to paper survey for additional self-reported information No  -RS     Rehab Potential Good  -RS     Patient/caregiver participated in establishment of treatment plan and goals Yes  -RS     Patient would benefit from skilled therapy intervention Yes  -RS        PT Plan    PT Frequency 1x/week  -RS     Predicted Duration of Therapy Intervention (PT) 10 sessions  -RS     Planned CPT's? PT RE-EVAL: 61575;PT THER PROC EA 15 MIN: 66040;PT THER ACT EA 15 MIN: 84442;PT MANUAL THERAPY EA 15 MIN: 37958;PT NEUROMUSC RE-EDUCATION EA 15 MIN: 81092;PT GAIT TRAINING EA 15 MIN: 48946;PT SELF CARE/HOME MGMT/TRAIN EA 15: 61242;PT HOT OR COLD PACK TREAT MCARE;PT ELECTRICAL STIM UNATTEND: ;PT TRACTION LUMBAR: 14674;PT EVAL LOW COMPLEXITY: 51996  -RS     PT Plan Comments Consider LTR, hip AD/AB, tierra breathing, thoracic rotation  -RS               User Key  (r) = Recorded By, (t) = Taken By, (c) = Cosigned By      Initials Name Provider Type    RS Salma Goodman, PT Physical Therapist                       OP Exercises       Row Name 02/20/24 1400             Subjective    Brief Description of Chief Complaint The pt is a 22 yo female who presents with pregnancy related back, hip, and pelvis pain. She is currently 23w 5d pregnant with her first child.  She reports history of back pain, she fell out of a window at 18 and had llanes rods placed, that has  been made worse since pregnancy. She experiences pain into her legs with prolonged standing and sitting. She reports pain and feeling out of breath with walking and stairs. She works in a [employer] center and has to be sitting/stationary most of the day. She did not exercise prior to pregnancy. She has done PT for her back in the past after surgery. She feels like she is going to thee bathroom more often than usual, she is going every couple hours but feels the urge to go more often (every hour). She has had one episode of JANICE in the past. She has pain with  penetration that began with pregnancy and reports pain in her pubic symphysis.  -RS         Total Minutes    52966 - PT Therapeutic Activity Minutes 18  -RS         Exercise 1    Exercise Name 1 belly band  -RS         Exercise 2    Exercise Name 2 transitional position performance  -RS         Exercise 3    Exercise Name 3 seated PPT  -RS      Reps 3 10  -RS         Exercise 4    Exercise Name 4 seated ball roll out  -RS      Cueing 4 Verbal;Demo  -RS      Reps 4 10  -RS      Time 4 5  -RS                User Key  (r) = Recorded By, (t) = Taken By, (c) = Cosigned By      Initials Name Provider Type    RS Salma Goodman, PT Physical Therapist                                            Time Calculation:     Start Time: 1405  Stop Time: 1444  Time Calculation (min): 39 min  Timed Charges  94218 - PT Therapeutic Activity Minutes: 18  Untimed Charges  PT Eval/Re-eval Minutes: 20  Total Minutes  Timed Charges Total Minutes: 18  Untimed Charges Total Minutes: 20   Total Minutes: 20     Therapy Charges for Today       Code Description Service Date Service Provider Modifiers Qty    33276641259 HC PT THERAPEUTIC ACT EA 15 MIN 2/20/2024 Salma Goodman, PT GP 1    53035342928 HC PT EVAL LOW COMPLEXITY 2 2/20/2024 Salma Goodman, PT GP 1                      Salma Goodman PT  2/20/2024

## 2024-02-29 ENCOUNTER — APPOINTMENT (OUTPATIENT)
Dept: PHYSICAL THERAPY | Facility: HOSPITAL | Age: 24
End: 2024-02-29
Payer: COMMERCIAL

## 2024-03-05 ENCOUNTER — HOSPITAL ENCOUNTER (EMERGENCY)
Facility: HOSPITAL | Age: 24
Discharge: HOME | End: 2024-03-05
Attending: EMERGENCY MEDICINE | Admitting: EMERGENCY MEDICINE
Payer: COMMERCIAL

## 2024-03-05 VITALS
TEMPERATURE: 98.2 F | RESPIRATION RATE: 18 BRPM | BODY MASS INDEX: 30.31 KG/M2 | SYSTOLIC BLOOD PRESSURE: 139 MMHG | WEIGHT: 200 LBS | HEIGHT: 68 IN | DIASTOLIC BLOOD PRESSURE: 80 MMHG | HEART RATE: 93 BPM | OXYGEN SATURATION: 97 %

## 2024-03-05 DIAGNOSIS — M54.32 SCIATICA OF LEFT SIDE: Primary | ICD-10-CM

## 2024-03-05 DIAGNOSIS — Z34.90 PREGNANCY, UNSPECIFIED GESTATIONAL AGE: ICD-10-CM

## 2024-03-05 PROCEDURE — 99282 EMERGENCY DEPT VISIT SF MDM: CPT

## 2024-03-05 NOTE — ED NOTES
PT to ER via PV from home for L leg numbness/tenderness. Pt states she has a metal maryanne in her back. Pain started last night

## 2024-03-05 NOTE — DISCHARGE INSTRUCTIONS
Rest when needed.  Stretch your leg and back muscles as tolerated.  May take Tylenol every 8-12 hours as needed for pain.  Please return to the emergency room for any worsening symptoms or concerns.

## 2024-03-05 NOTE — ED PROVIDER NOTES
EMERGENCY DEPARTMENT ENCOUNTER  Room Number:  23/23  PCP: Provider, No Known  Independent Historians: Patient      HPI:  Chief Complaint: had concerns including Leg Pain.  And numbness    A complete HPI/ROS/PMH/PSH/SH/FH are unobtainable due to: None    Chronic or social conditions impacting patient care (Social Determinants of Health): None      Context: Abdon Andres is a 23 y.o. female with a medical history of remote spinal fusion surgery of T11-L3 due to traumatic injury in 2018, who presents to the ED c/o acute numbness in the left thigh and low back/buttocks area.  Patient is currently 25 weeks pregnant, G1, P0.  She says that the numbness and discomfort in her left thigh began yesterday but is actually improved this morning.  She expresses concern because of previous surgery involving Garcia rods to the thoracic and lumbar spine area.  She denies any pain in the leg right now.  She denies any swelling of the leg.  She denies any redness or fevers.  She denies any bowel or bladder incontinence.      Review of prior external notes (non-ED) -and- Review of prior external test results outside of this encounter: I reviewed the OB office progress note from February 7, 2024 when she had visit for prenatal care.      PAST MEDICAL HISTORY  Active Ambulatory Problems     Diagnosis Date Noted    No Active Ambulatory Problems     Resolved Ambulatory Problems     Diagnosis Date Noted    No Resolved Ambulatory Problems     No Additional Past Medical History         PAST SURGICAL HISTORY  Past Surgical History:   Procedure Laterality Date    SPINE SURGERY           FAMILY HISTORY  History reviewed. No pertinent family history.      SOCIAL HISTORY  Social History     Socioeconomic History    Marital status: Single   Tobacco Use    Smoking status: Never    Smokeless tobacco: Never   Vaping Use    Vaping status: Never Used   Substance and Sexual Activity    Alcohol use: Not Currently    Drug use: Never    Sexual  activity: Yes         ALLERGIES  Other      REVIEW OF SYSTEMS  Review of Systems  Included in HPI  All systems reviewed and negative except for those discussed in HPI.      PHYSICAL EXAM    I have reviewed the triage vital signs and nursing notes.    ED Triage Vitals   Temp Heart Rate Resp BP SpO2   03/05/24 0834 03/05/24 0834 03/05/24 0834 03/05/24 0903 03/05/24 0834   98.2 °F (36.8 °C) 119 18 150/85 98 %      Temp src Heart Rate Source Patient Position BP Location FiO2 (%)   -- -- -- -- --              Physical Exam  GENERAL: alert, no acute distress, sitting up in bed comfortably, smiling  SKIN: Warm, dry, no diaphoresis  HENT: Normocephalic, atraumatic  EYES: no scleral icterus  CV: regular rhythm, regular rate  RESPIRATORY: normal effort, lungs clear  ABDOMEN: soft, nontender, nondistended  MUSCULOSKELETAL: no deformity, no asymmetry to the lower extremities.  No edema to the lower legs or feet or ankles.  No specific tenderness to the back soft tissues bilaterally.  There is a midline scar from previous surgery.  There are no step-offs or deformities.  There are no areas of palpable tenderness throughout the thoracic or lumbar spinous processes.  NEURO: alert, moves all extremities, follows commands, no focal motor or sensory deficits at all            LAB RESULTS  No results found for this or any previous visit (from the past 24 hour(s)).      RADIOLOGY  No Radiology Exams Resulted Within Past 24 Hours      MEDICATIONS GIVEN IN ER  Medications - No data to display      ORDERS PLACED DURING THIS VISIT:  No orders of the defined types were placed in this encounter.        OUTPATIENT MEDICATION MANAGEMENT:  No current Epic-ordered facility-administered medications on file.     Current Outpatient Medications Ordered in Epic   Medication Sig Dispense Refill    fluticasone (CUTIVATE) 0.005 % ointment APPLY TO AFFECTED AREAS OF THE BODY TWICE DAILY      hydrocortisone 2.5 % ointment APPLY TO AFFECTED AREA OF FACE  "TWICE DAILY      prenatal vitamin (prenatal, CLASSIC, vitamin) tablet Take  by mouth Daily.           PROCEDURES  Procedures          PROGRESS, DATA ANALYSIS, CONSULTS, AND MEDICAL DECISION MAKING  All labs have been independently interpreted by me.  All radiology studies have been reviewed by me. All EKG's have been independently viewed and interpreted by me.  Discussion below represents my analysis of pertinent findings related to patient's condition, differential diagnosis, treatment plan and final disposition.    Differential diagnosis includes but is not limited to sciatica, degenerative disc disease, muscle strain, DVT.    Clinical Scores:                   ED Course as of 03/05/24 1131   Tue Mar 05, 2024   1129 Patient's symptoms are actually improved this morning compared to yesterday.  I find no worrisome features on her physical exam at all.  She has no focal motor or sensory deficits at all.  Since she is pregnant, I am particularly hesitant to order an x-ray or CT scan of the spine.  An MRI study would be safe to order but it is not warranted at this time given her nonemergent findings.  I think it is most likely that she is having some simple features of sciatica which are common in pregnancy, apart from her unique previous spine surgery history.  At this point, I think she can be discharged home in good condition with careful \"return to ED\" instructions.  Fortunately, she has an appointment with her OB doctor tomorrow and I advised her to keep that appointment as scheduled and speak with her doctor about these new symptoms so that they are aware as well. [BRITNEY]      ED Course User Index  [BRITNEY] Leo Sanders MD           AS OF 11:31 EST VITALS:    BP - 150/85  HR - 119  TEMP - 98.2 °F (36.8 °C)  O2 SATS - 98%    COMPLEXITY OF CARE  Admission was considered but after careful review of the patient's presentation, physical examination, diagnostic results, and response to treatment the patient may be safely " discharged with outpatient follow-up.      DIAGNOSIS  Final diagnoses:   Sciatica of left side   Pregnancy, unspecified gestational age         DISPOSITION  ED Disposition       ED Disposition   Discharge    Condition   Stable    Comment   --                Please note that portions of this document were completed with a voice recognition program.    Note Disclaimer: At [facility], we believe that sharing information builds trust and better relationships. You are receiving this note because you recently visited [facility]. It is possible you will see health information before a provider has talked with you about it. This kind of information can be easy to misunderstand. To help you fully understand what it means for your health, we urge you to discuss this note with your provider.         Leo Sanders MD  03/05/24 7363

## 2024-03-06 ENCOUNTER — ROUTINE PRENATAL (OUTPATIENT)
Dept: OBSTETRICS AND GYNECOLOGY | Facility: CLINIC | Age: 24
End: 2024-03-06
Payer: COMMERCIAL

## 2024-03-06 ENCOUNTER — LAB (OUTPATIENT)
Dept: OBSTETRICS AND GYNECOLOGY | Facility: CLINIC | Age: 24
End: 2024-03-06
Payer: COMMERCIAL

## 2024-03-06 VITALS — DIASTOLIC BLOOD PRESSURE: 64 MMHG | SYSTOLIC BLOOD PRESSURE: 120 MMHG | WEIGHT: 204 LBS | BODY MASS INDEX: 31.02 KG/M2

## 2024-03-06 DIAGNOSIS — Z3A.21 21 WEEKS GESTATION OF PREGNANCY: ICD-10-CM

## 2024-03-06 DIAGNOSIS — Z34.02 ENCOUNTER FOR SUPERVISION OF NORMAL FIRST PREGNANCY IN SECOND TRIMESTER: Primary | ICD-10-CM

## 2024-03-06 DIAGNOSIS — Z3A.25 25 WEEKS GESTATION OF PREGNANCY: Primary | ICD-10-CM

## 2024-03-06 LAB
GLUCOSE UR STRIP-MCNC: NEGATIVE MG/DL
PROT UR STRIP-MCNC: NEGATIVE MG/DL

## 2024-03-06 NOTE — PROGRESS NOTES
CC: Obstetric visit    HPI: 23-year-old  1 para 0 presents at 25-6/7 weeks for her obstetric visit.  Her baby is moving actively.  She has low back pain hip numbness recently which has been attributed to sciatica.  She is feeling better today.    Physical examination    The abdomen is soft and gravid.  There is no epigastric tenderness.  No fundal tenderness.  No CVA tenderness.  No suprapubic tenderness.  Extremities are equal in size    Assessment and plan    1.  Intrauterine pregnancy at 25-6/7 weeks  2.  Intermittent low back pain, pelvic pressure and numbness in the thighs.  This is most consistent with sciatic nerve compression.  We discussed strategies for management of this.  The patient has already started physical therapy and has another visit scheduled for tomorrow.

## 2024-03-07 ENCOUNTER — HOSPITAL ENCOUNTER (OUTPATIENT)
Dept: PHYSICAL THERAPY | Facility: HOSPITAL | Age: 24
Setting detail: THERAPIES SERIES
Discharge: HOME | End: 2024-03-07
Payer: COMMERCIAL

## 2024-03-07 DIAGNOSIS — R10.2 PELVIC PAIN AFFECTING PREGNANCY IN SECOND TRIMESTER, ANTEPARTUM: ICD-10-CM

## 2024-03-07 DIAGNOSIS — O26.892 PELVIC PAIN AFFECTING PREGNANCY IN SECOND TRIMESTER, ANTEPARTUM: ICD-10-CM

## 2024-03-07 DIAGNOSIS — O26.899 PREGNANCY RELATED LOW BACK PAIN, ANTEPARTUM: Primary | ICD-10-CM

## 2024-03-07 DIAGNOSIS — M54.50 PREGNANCY RELATED LOW BACK PAIN, ANTEPARTUM: Primary | ICD-10-CM

## 2024-03-07 LAB
BASOPHILS # BLD AUTO: 0.03 10*3/MM3 (ref 0–0.2)
BASOPHILS NFR BLD AUTO: 0.2 % (ref 0–1.5)
BLD GP AB SCN SERPL QL: NEGATIVE
EOSINOPHIL # BLD AUTO: 0.15 10*3/MM3 (ref 0–0.4)
EOSINOPHIL NFR BLD AUTO: 1.2 % (ref 0.3–6.2)
ERYTHROCYTE [DISTWIDTH] IN BLOOD BY AUTOMATED COUNT: 13.4 % (ref 12.3–15.4)
GLUCOSE 1H P 50 G GLC PO SERPL-MCNC: 149 MG/DL (ref 65–139)
HCT VFR BLD AUTO: 30.4 % (ref 34–46.6)
HGB BLD-MCNC: 9.8 G/DL (ref 12–15.9)
IMM GRANULOCYTES # BLD AUTO: 0.41 10*3/MM3 (ref 0–0.05)
IMM GRANULOCYTES NFR BLD AUTO: 3.4 % (ref 0–0.5)
LYMPHOCYTES # BLD AUTO: 0.85 10*3/MM3 (ref 0.7–3.1)
LYMPHOCYTES NFR BLD AUTO: 7 % (ref 19.6–45.3)
MCH RBC QN AUTO: 25.1 PG (ref 26.6–33)
MCHC RBC AUTO-ENTMCNC: 32.2 G/DL (ref 31.5–35.7)
MCV RBC AUTO: 77.9 FL (ref 79–97)
MONOCYTES # BLD AUTO: 0.78 10*3/MM3 (ref 0.1–0.9)
MONOCYTES NFR BLD AUTO: 6.4 % (ref 5–12)
NEUTROPHILS # BLD AUTO: 10.01 10*3/MM3 (ref 1.7–7)
NEUTROPHILS NFR BLD AUTO: 81.8 % (ref 42.7–76)
NRBC BLD AUTO-RTO: 0 /100 WBC (ref 0–0.2)
PLATELET # BLD AUTO: 300 10*3/MM3 (ref 140–450)
RBC # BLD AUTO: 3.9 10*6/MM3 (ref 3.77–5.28)
WBC # BLD AUTO: 12.23 10*3/MM3 (ref 3.4–10.8)

## 2024-03-07 PROCEDURE — 97140 MANUAL THERAPY 1/> REGIONS: CPT

## 2024-03-07 PROCEDURE — 97110 THERAPEUTIC EXERCISES: CPT

## 2024-03-07 NOTE — THERAPY TREATMENT NOTE
Outpatient Physical Therapy Ortho Treatment Note  [facility]     Patient Name: Abdon Andres  : 2000  MRN: [id number]  Today's Date: 3/7/2024      Visit Date: 2024    Visit Dx:    ICD-10-CM ICD-9-CM   1. Pregnancy related low back pain, antepartum  O26.899 646.83    M54.50 724.2   2. Pelvic pain affecting pregnancy in second trimester, antepartum  O26.892 646.83    R10.2 625.9       There is no problem list on file for this patient.       No past medical history on file.     Past Surgical History:   Procedure Laterality Date    SPINE SURGERY                          PT Assessment/Plan       Row Name 24 1100          PT Assessment    Assessment Comments Pt returns for first follow up session after initial eval reporting fair compliance with HEP and worsening L sciatic symptoms. Initiated manual therapy focused on L lumbar spine/hip with good tolerance and reviewed initial HEP and progressed to include hip AD, piriformis stretch, sciatic nn glide, and thoracic rotation all with good tolerance. Adjusted belly band and reviewed appropriate fit, pt reports understanding. Issued HEP and reviewed with pt who remains appropriate for skilled PT.  -RS        PT Plan    PT Plan Comments Consider hip flexor stretch, hip hikes  -RS               User Key  (r) = Recorded By, (t) = Taken By, (c) = Cosigned By      Initials Name Provider Type    RS Salma Goodman, PT Physical Therapist                       OP Exercises       Row Name 24 1000             Subjective    Subjective Comments Pt reports she had to go to the hospital because she lost feeling in her L leg, they said it is er sciatic nerve. The pain better when she lays down  -RS         Total Minutes    48476 - PT Therapeutic Exercise Minutes 23  -RS      74645 - PT Manual Therapy Minutes 15  -RS         Exercise 1    Exercise Name 1 review of transitional positions  -RS      Additional Comments log roll, exhale with STS  -RS          Exercise 2    Exercise Name 2 PPT supine  -RS      Cueing 2 Verbal;Demo  -RS      Reps 2 15  -RS      Time 2 5  -RS      Additional Comments with AD ball squeeze  -RS         Exercise 3    Exercise Name 3 belly band fitting/adjustment  -RS         Exercise 4    Exercise Name 4 seated ball roll out  -RS      Cueing 4 Verbal;Demo  -RS      Reps 4 10  -RS      Time 4 5  -RS      Additional Comments ea  -RS         Exercise 5    Exercise Name 5 sidelying thoracic rotation  -RS      Reps 5 10ea  -RS         Exercise 6    Exercise Name 6 sl clamshell  -RS      Cueing 6 Verbal;Demo  -RS      Reps 6 20  -RS         Exercise 7    Exercise Name 7 piriformis stretch  -RS      Cueing 7 Verbal;Demo  -RS      Reps 7 3  -RS      Time 7 20  -RS         Exercise 8    Exercise Name 8 seated sciatic nn glide  -RS      Cueing 8 Verbal;Demo  -RS      Reps 8 10  -RS                User Key  (r) = Recorded By, (t) = Taken By, (c) = Cosigned By      Initials Name Provider Type    RS Salma Goodman, PT Physical Therapist                             Manual Rx (Last 36 Hours)       Manual Treatments       Row Name 03/07/24 1000             Total Minutes    38240 - PT Manual Therapy Minutes 15  -RS         Manual Rx 1    Manual Rx 1 Location L hip/lumbar ES STM  -RS      Manual Rx 1 Type sidelying pillow between knees  -RS         Manual Rx 2    Manual Rx 2 Location lumbar distract in HL, gapping in R sidelying  -RS                User Key  (r) = Recorded By, (t) = Taken By, (c) = Cosigned By      Initials Name Provider Type    RS Salma Goodman, PT Physical Therapist                     PT OP Goals       Row Name 03/07/24 1000          PT Short Term Goals    STG Date to Achieve 04/05/24  -RS     STG 1 The pt will demonstrate IND with transitional position performance without pain greater than 4/10 to facilitate improved bed mobility.  -RS     STG 1 Progress Ongoing  -RS     STG 2 The pt will demonstrate IND and compliant with HEP  focused on lumbopelvic stability and pain management.  -RS     STG 2 Progress Ongoing  -RS        Long Term Goals    LTG Date to Achieve 05/20/24  -RS     LTG 1 The pt will report understanding of labor prep from a pelvic floor perspective.  -RS     LTG 1 Progress Ongoing  -RS     LTG 2 The pt will report at most 4/10 back pain at the end of a work day to facilitate improved work performance.  -RS     LTG 2 Progress Ongoing  -RS     LTG 3 The pt will report at least 50% reduction in LE pain with prolonged positioning with use of HEP and compressionsocks to facilitate improved activity tolerance.  -RS     LTG 3 Progress Ongoing  -RS     LTG 4 The pt will report no JANICE with laugh/cough with use of knack for improved pressure management.  -RS     LTG 4 Progress Ongoing  -RS               User Key  (r) = Recorded By, (t) = Taken By, (c) = Cosigned By      Initials Name Provider Type    RS Salma Goodman PT Physical Therapist                    Therapy Education  Education Details: Access Code [email or web address]  Given: HEP  Program: Reinforced, Progressed, New  How Provided: Verbal, Demonstration, Written  Provided to: Patient  Level of Understanding: Verbalized, Demonstrated              Time Calculation:   Start Time: 1050  Stop Time: 1130  Time Calculation (min): 40 min  Timed Charges  87175 - PT Therapeutic Exercise Minutes: 23  53545 - PT Manual Therapy Minutes: 15  Total Minutes  Timed Charges Total Minutes: 38   Total Minutes: 38  Therapy Charges for Today       Code Description Service Date Service Provider Modifiers Qty    00368672721  PT THER PROC EA 15 MIN 3/7/2024 Salma Goodman, PT GP 2    39606128087 HC PT MANUAL THERAPY EA 15 MIN 3/7/2024 Salma Goodman, PT GP 1                      Salma Goodman PT  3/7/2024

## 2024-03-12 ENCOUNTER — TELEPHONE (OUTPATIENT)
Dept: OBSTETRICS AND GYNECOLOGY | Facility: CLINIC | Age: 24
End: 2024-03-12
Payer: COMMERCIAL

## 2024-03-12 NOTE — TELEPHONE ENCOUNTER
----- Message from Abdon Andres sent at 3/12/2024  3:31 PM EDT -----  Regarding: .  Contact: [phone]  Can you all please call me when you review my glucose results please

## 2024-03-12 NOTE — TELEPHONE ENCOUNTER
Yes, thank you.  It looks like she has failed her 3-hour glucose tolerance test.  Please help her to schedule an appointment with Delia to start glucose monitoring and a diabetic diet.  Thank you.

## 2024-03-12 NOTE — TELEPHONE ENCOUNTER
Patient is calling for her 3 hour glucose results, do I need to schedule a consult with Delia for her?    Please advise, thanks!

## 2024-03-14 ENCOUNTER — HOSPITAL ENCOUNTER (OUTPATIENT)
Dept: PHYSICAL THERAPY | Facility: HOSPITAL | Age: 24
Setting detail: THERAPIES SERIES
Discharge: HOME | End: 2024-03-14
Payer: COMMERCIAL

## 2024-03-14 DIAGNOSIS — O26.892 PELVIC PAIN AFFECTING PREGNANCY IN SECOND TRIMESTER, ANTEPARTUM: ICD-10-CM

## 2024-03-14 DIAGNOSIS — M54.50 PREGNANCY RELATED LOW BACK PAIN, ANTEPARTUM: Primary | ICD-10-CM

## 2024-03-14 DIAGNOSIS — R10.2 PELVIC PAIN AFFECTING PREGNANCY IN SECOND TRIMESTER, ANTEPARTUM: ICD-10-CM

## 2024-03-14 DIAGNOSIS — O26.899 PREGNANCY RELATED LOW BACK PAIN, ANTEPARTUM: Primary | ICD-10-CM

## 2024-03-14 PROCEDURE — 97110 THERAPEUTIC EXERCISES: CPT

## 2024-03-14 PROCEDURE — 97140 MANUAL THERAPY 1/> REGIONS: CPT

## 2024-03-14 NOTE — THERAPY TREATMENT NOTE
Outpatient Physical Therapy Ortho Treatment Note  [facility]     Patient Name: Abdon Andres  : 2000  MRN: [id number]  Today's Date: 3/14/2024      Visit Date: 2024    Visit Dx:    ICD-10-CM ICD-9-CM   1. Pregnancy related low back pain, antepartum  O26.899 646.83    M54.50 724.2   2. Pelvic pain affecting pregnancy in second trimester, antepartum  O26.892 646.83    R10.2 625.9       There is no problem list on file for this patient.       No past medical history on file.     Past Surgical History:   Procedure Laterality Date    SPINE SURGERY                          PT Assessment/Plan       Row Name 24 0900          PT Assessment    Assessment Comments Pt reports fair compliance with HEP as well as a short period of decreased pain following last treatent session. Continued manual therapy focused on pain managemet and initiated  hooklying hip hikes with good tolerance. Updated and reviewed HEP with pt who reports understanding, encouraged improved compliance with HEP to facilitate improved pain management.  -RS        PT Plan    PT Plan Comments Consider side steps, STS with PFM  -RS               User Key  (r) = Recorded By, (t) = Taken By, (c) = Cosigned By      Initials Name Provider Type    RS Salma Goodman, PT Physical Therapist                       OP Exercises       Row Name 24 0800             Subjective    Subjective Comments The pt reports she felt better for a few days after last time but the past few days have been a little worse. She has not done her HEP much since last week.  -RS         Subjective Pain    Able to rate subjective pain? yes  -RS         Total Minutes    07533 - PT Therapeutic Exercise Minutes 23  -RS      56989 - PT Manual Therapy Minutes 17  -RS         Exercise 1    Exercise Name 1 review of transitional positions  -RS      Additional Comments log roll, exhale with STS  -RS         Exercise 2    Exercise Name 2 PPT supine  -RS      Cueing 2  Verbal;Demo  -RS      Reps 2 15  -RS      Time 2 5  -RS      Additional Comments with AD ball squeeze  -RS         Exercise 3    Exercise Name 3 hip hikes supine  -RS      Cueing 3 Verbal;Demo  -RS      Reps 3 15ea  -RS         Exercise 4    Exercise Name 4 seated ball roll out  -RS      Cueing 4 Verbal;Demo  -RS      Reps 4 10  -RS      Time 4 5  -RS         Exercise 5    Exercise Name 5 sidelying thoracic rotation  -RS      Reps 5 10ea  -RS         Exercise 6    Exercise Name 6 sl clamshell  -RS      Cueing 6 Verbal;Demo  -RS      Reps 6 20  -RS         Exercise 7    Exercise Name 7 piriformis stretch  -RS      Cueing 7 Verbal;Demo  -RS      Reps 7 3  -RS      Time 7 20  -RS         Exercise 8    Exercise Name 8 seated sciatic nn glide  -RS      Cueing 8 Verbal;Demo  -RS      Reps 8 10  -RS                User Key  (r) = Recorded By, (t) = Taken By, (c) = Cosigned By      Initials Name Provider Type    RS Salma Goodman, PT Physical Therapist                             Manual Rx (Last 36 Hours)       Manual Treatments       Row Name 03/14/24 0800             Total Minutes    09579 - PT Manual Therapy Minutes 17  -RS         Manual Rx 1    Manual Rx 1 Location L hip/lumbar ES STM  -RS      Manual Rx 1 Type sidelying pillow between knees  -RS         Manual Rx 2    Manual Rx 2 Location lumbar distract in HL, gapping in R sidelying  -RS                User Key  (r) = Recorded By, (t) = Taken By, (c) = Cosigned By      Initials Name Provider Type    RS Salma Goodman, PT Physical Therapist                     PT OP Goals       Row Name 03/14/24 0800          PT Short Term Goals    STG Date to Achieve 04/05/24  -RS     STG 1 The pt will demonstrate IND with transitional position performance without pain greater than 4/10 to facilitate improved bed mobility.  -RS     STG 1 Progress Ongoing  -RS     STG 2 The pt will demonstrate IND and compliant with HEP focused on lumbopelvic stability and pain management.  -RS      STG 2 Progress Ongoing  -RS        Long Term Goals    LTG Date to Achieve 05/20/24  -RS     LTG 1 The pt will report understanding of labor prep from a pelvic floor perspective.  -RS     LTG 1 Progress Ongoing  -RS     LTG 2 The pt will report at most 4/10 back pain at the end of a work day to facilitate improved work performance.  -RS     LTG 2 Progress Ongoing  -RS     LTG 3 The pt will report at least 50% reduction in LE pain with prolonged positioning with use of HEP and compressionsocks to facilitate improved activity tolerance.  -RS     LTG 3 Progress Ongoing  -RS     LTG 4 The pt will report no JANICE with laugh/cough with use of knack for improved pressure management.  -RS     LTG 4 Progress Ongoing  -RS               User Key  (r) = Recorded By, (t) = Taken By, (c) = Cosigned By      Initials Name Provider Type    Salma Eagle PT Physical Therapist                    Therapy Education  Education Details: texted updated HEP program  Given: HEP  Program: Reinforced, Progressed  How Provided: Verbal, Demonstration, Written  Provided to: Patient  Level of Understanding: Verbalized, Demonstrated              Time Calculation:   Start Time: 0830  Stop Time: 0915  Time Calculation (min): 45 min  Timed Charges  68496 - PT Therapeutic Exercise Minutes: 23  46863 - PT Manual Therapy Minutes: 17  Total Minutes  Timed Charges Total Minutes: 40   Total Minutes: 40  Therapy Charges for Today       Code Description Service Date Service Provider Modifiers Qty    70306563181  PT THER PROC EA 15 MIN 3/14/2024 Salma Goodman PT GP 2    74834791618 HC PT MANUAL THERAPY EA 15 MIN 3/14/2024 Salma Goodman, PT GP 1                      Salma Goodman PT  3/14/2024

## 2024-03-15 ENCOUNTER — ROUTINE PRENATAL (OUTPATIENT)
Dept: OBSTETRICS AND GYNECOLOGY | Facility: CLINIC | Age: 24
End: 2024-03-15
Payer: COMMERCIAL

## 2024-03-15 VITALS — BODY MASS INDEX: 31.63 KG/M2 | WEIGHT: 208 LBS | SYSTOLIC BLOOD PRESSURE: 129 MMHG | DIASTOLIC BLOOD PRESSURE: 68 MMHG

## 2024-03-15 DIAGNOSIS — O24.410 GDM (GESTATIONAL DIABETES MELLITUS), CLASS A1: Primary | ICD-10-CM

## 2024-03-15 DIAGNOSIS — Z34.93 PRENATAL CARE IN THIRD TRIMESTER: ICD-10-CM

## 2024-03-15 DIAGNOSIS — Z3A.27 27 WEEKS GESTATION OF PREGNANCY: ICD-10-CM

## 2024-03-15 RX ORDER — LANCETS 30 GAUGE
1 EACH MISCELLANEOUS 4 TIMES DAILY
Qty: 100 EACH | Refills: 1 | Status: SHIPPED | OUTPATIENT
Start: 2024-03-15

## 2024-03-15 NOTE — PROGRESS NOTES
Chief Complaint   Patient presents with    Gestational Diabetes     OB follow up     Abdon Andres is a 23 y.o.  27w1d being seen today for her obstetrical visit.  Patient reports no complaints. Fetal movement: normal. She is here today for education on the management of diabetes in pregnancy. Family hx: unknown, pt is adopted  Prepregnancy wt 170. BMI 31.63    Review of Systems  Cramping/contractions : denies  Vaginal bleeding: denies  Fetal movement normal     /68   Wt 94.3 kg (208 lb)   LMP 2023   BMI 31.63 kg/m²     Assessment/Plan  Diagnoses and all orders for this visit:    1. GDM (gestational diabetes mellitus), class A1 (Primary)  -     glucose blood test strip; Take blood sugar fasting (AM) and 2 hours after start of breakfast, lunch and dinner  Dispense: 100 each; Refill: 12  -     Lancets misc; Use 1 each 4 (Four) Times a Day.  Dispense: 100 each; Refill: 1  -     Blood Glucose Monitoring Suppl w/Device kit; Use 1 each 1 (One) Time for 1 dose.  Dispense: 1 each; Refill: 0    2. Prenatal care in third trimester    3. 27 weeks gestation of pregnancy      Discussed Fetal kick counts  Counseled on dietary modifications and restrictions, risks of hyperglycemia on the pregnancy and for , life time risks for type 2 diabetes, discussed frequency of accuchecks. Reviewed goals of fasting 60 - 90 and 2 hour postprandial< 120 throughout pregnancy, S&S of hypoglycemia, corrective action for hypoglycemia, sick day management, fetal kick counts, and exercise. Provided with resources and glucose log. Reviewed use of glucometer. All questions answered appropriately. Encouraged to call office with any further questions. Education material given. Instructed to call the office for glucose >180     [email or web address]    Disc importance of good glycemic control in pregnancy. Disc risks associated with poorly or uncontrolled glucose  including but not limited to excessive birth weight,  birth, lung immaturity, polyhydramnios,  hypoglycemia, stillbirth, etc.      Pt will send blood glucose log to me weekly through [employer] or via telephone.    Reviewed this stage of pregnancy  Problem list updated   Follow up in 1 week(s) with Dr. Moeller    I spent 30 minutes caring for Abdon on this date of service. This time includes time spent by me in the following activities: preparing for the visit, reviewing tests, obtaining and/or reviewing a separately obtained history, performing a medically appropriate examination and/or evaluation, counseling and educating the patient/family/caregiver, ordering medications, tests, or procedures, referring and communicating with other health care professionals, documenting information in the medical record, and independently interpreting results and communicating that information with the patient/family/caregiver    Delia Guzman, APRN  3/15/2024  11:21 EDT

## 2024-03-19 ENCOUNTER — TELEPHONE (OUTPATIENT)
Dept: OBSTETRICS AND GYNECOLOGY | Facility: CLINIC | Age: 24
End: 2024-03-19
Payer: COMMERCIAL

## 2024-03-19 ENCOUNTER — HOSPITAL ENCOUNTER (EMERGENCY)
Facility: HOSPITAL | Age: 24
Discharge: HOME | End: 2024-03-19
Attending: OBSTETRICS & GYNECOLOGY | Admitting: OBSTETRICS & GYNECOLOGY
Payer: COMMERCIAL

## 2024-03-19 VITALS — TEMPERATURE: 98.7 F | WEIGHT: 208 LBS | HEIGHT: 68 IN | BODY MASS INDEX: 31.52 KG/M2

## 2024-03-19 LAB
ALBUMIN SERPL-MCNC: 3.4 G/DL (ref 3.5–5.2)
ALBUMIN/GLOB SERPL: 1 G/DL
ALP SERPL-CCNC: 99 U/L (ref 39–117)
ALT SERPL W P-5'-P-CCNC: 13 U/L (ref 1–33)
ANION GAP SERPL CALCULATED.3IONS-SCNC: 10 MMOL/L (ref 5–15)
AST SERPL-CCNC: <5 U/L (ref 1–32)
BASOPHILS # BLD AUTO: 0.01 10*3/MM3 (ref 0–0.2)
BASOPHILS NFR BLD AUTO: 0.1 % (ref 0–1.5)
BILIRUB SERPL-MCNC: <0.2 MG/DL (ref 0–1.2)
BILIRUB UR QL STRIP: NEGATIVE
BUN SERPL-MCNC: 6 MG/DL (ref 6–20)
BUN/CREAT SERPL: 11.3 (ref 7–25)
CALCIUM SPEC-SCNC: 8.8 MG/DL (ref 8.6–10.5)
CHLORIDE SERPL-SCNC: 104 MMOL/L (ref 98–107)
CLARITY UR: ABNORMAL
CO2 SERPL-SCNC: 23 MMOL/L (ref 22–29)
COLOR UR: YELLOW
CREAT SERPL-MCNC: 0.53 MG/DL (ref 0.57–1)
DEPRECATED RDW RBC AUTO: 37.2 FL (ref 37–54)
EGFRCR SERPLBLD CKD-EPI 2021: 135.1 ML/MIN/1.73
EOSINOPHIL # BLD AUTO: 0.12 10*3/MM3 (ref 0–0.4)
EOSINOPHIL NFR BLD AUTO: 1.3 % (ref 0.3–6.2)
ERYTHROCYTE [DISTWIDTH] IN BLOOD BY AUTOMATED COUNT: 13.5 % (ref 12.3–15.4)
GLOBULIN UR ELPH-MCNC: 3.3 GM/DL
GLUCOSE BLDC GLUCOMTR-MCNC: 96 MG/DL (ref 70–130)
GLUCOSE SERPL-MCNC: 94 MG/DL (ref 65–99)
GLUCOSE UR STRIP-MCNC: NEGATIVE MG/DL
HCT VFR BLD AUTO: 28.7 % (ref 34–46.6)
HGB BLD-MCNC: 9.2 G/DL (ref 12–15.9)
HGB UR QL STRIP.AUTO: NEGATIVE
IMM GRANULOCYTES # BLD AUTO: 0.29 10*3/MM3 (ref 0–0.05)
IMM GRANULOCYTES NFR BLD AUTO: 3.1 % (ref 0–0.5)
KETONES UR QL STRIP: NEGATIVE
LEUKOCYTE ESTERASE UR QL STRIP.AUTO: NEGATIVE
LYMPHOCYTES # BLD AUTO: 0.86 10*3/MM3 (ref 0.7–3.1)
LYMPHOCYTES NFR BLD AUTO: 9.1 % (ref 19.6–45.3)
MCH RBC QN AUTO: 24.6 PG (ref 26.6–33)
MCHC RBC AUTO-ENTMCNC: 32.1 G/DL (ref 31.5–35.7)
MCV RBC AUTO: 76.7 FL (ref 79–97)
MONOCYTES # BLD AUTO: 0.67 10*3/MM3 (ref 0.1–0.9)
MONOCYTES NFR BLD AUTO: 7.1 % (ref 5–12)
NEUTROPHILS NFR BLD AUTO: 7.53 10*3/MM3 (ref 1.7–7)
NEUTROPHILS NFR BLD AUTO: 79.3 % (ref 42.7–76)
NITRITE UR QL STRIP: NEGATIVE
NRBC BLD AUTO-RTO: 0 /100 WBC (ref 0–0.2)
PH UR STRIP.AUTO: 7 [PH] (ref 5–8)
PLATELET # BLD AUTO: 280 10*3/MM3 (ref 140–450)
PMV BLD AUTO: 9.3 FL (ref 6–12)
POTASSIUM SERPL-SCNC: 3.7 MMOL/L (ref 3.5–5.2)
PROT SERPL-MCNC: 6.7 G/DL (ref 6–8.5)
PROT UR QL STRIP: NEGATIVE
RBC # BLD AUTO: 3.74 10*6/MM3 (ref 3.77–5.28)
SODIUM SERPL-SCNC: 137 MMOL/L (ref 136–145)
SP GR UR STRIP: 1.02 (ref 1–1.03)
UROBILINOGEN UR QL STRIP: ABNORMAL
WBC NRBC COR # BLD AUTO: 9.48 10*3/MM3 (ref 3.4–10.8)

## 2024-03-19 PROCEDURE — 85025 COMPLETE CBC W/AUTO DIFF WBC: CPT | Performed by: OBSTETRICS & GYNECOLOGY

## 2024-03-19 PROCEDURE — 59025 FETAL NON-STRESS TEST: CPT

## 2024-03-19 PROCEDURE — 59025 FETAL NON-STRESS TEST: CPT | Performed by: OBSTETRICS & GYNECOLOGY

## 2024-03-19 PROCEDURE — 99284 EMERGENCY DEPT VISIT MOD MDM: CPT | Performed by: OBSTETRICS & GYNECOLOGY

## 2024-03-19 PROCEDURE — 81003 URINALYSIS AUTO W/O SCOPE: CPT | Performed by: OBSTETRICS & GYNECOLOGY

## 2024-03-19 PROCEDURE — 82948 REAGENT STRIP/BLOOD GLUCOSE: CPT

## 2024-03-19 PROCEDURE — 80053 COMPREHEN METABOLIC PANEL: CPT | Performed by: OBSTETRICS & GYNECOLOGY

## 2024-03-19 RX ORDER — FERROUS SULFATE 325(65) MG
325 TABLET ORAL
Qty: 30 TABLET | Refills: 2 | Status: SHIPPED | OUTPATIENT
Start: 2024-03-19

## 2024-03-19 NOTE — NON STRESS TEST
Abdon Andres, samantha  at 27w5d with an VANE of 2024, by Last Menstrual Period, was seen at [facility] for a nonstress test.    Chief Complaint   Patient presents with    Hyperglycemia     Pt reports glucose of 215 at home; came in per Dr request (called office)       There is no problem list on file for this patient.      Start Time: 1428  Stop Time: 1528    Interpretation A  Nonstress Test Interpretation A: Reactive

## 2024-03-19 NOTE — TELEPHONE ENCOUNTER
JOSE. Patient is 27 weeks pregnant. When took blood sugar it was 215. Per Dr Moeller informed patieht to go to [facility] to be evaluated. Patient said would go.

## 2024-03-19 NOTE — LETTER
March 19, 2024     Patient: Abdon Andres   YOB: 2000   Date of Visit: 3/19/2024       To Whom It May Concern:    Abdon Andres was today in Labor and Delivery at [facility].   It is my medical opinion that Abdon Andres may return to full duty immediately with no restrictions.         Sincerely,    AMBROSIO Jalloh

## 2024-03-19 NOTE — OBED NOTES
[facility] Note OB        Patient Name: Abdon Andres  YOB: 2000  MRN: [id number]  Admission Date: 3/19/2024  1:41 PM  Date of Service: 3/19/2024    Chief Complaint: Hyperglycemia (Pt reports glucose of 215 at home; came in per Dr request (called office))        Subjective     Abdon Andres is a 23 y.o. female  at 27w5d with Estimated Date of Delivery: 24 who presents with the chief complaint listed above.  She was recently diagnosed with gestational diabetes. She has been checking her blood glucose at home for the past three days.  She last ate at 10 pm.  When she checked her blood glucose today at 1200 it was 215.  She called her Ob office and was instructed to come to the hospital.    The patient has been recording her blood glucose for the past three days but did not bring her log with her today.  She does not remember the valuses but she knows that she has never had a value as high as 215.  She sees Nelson Moeller MD for her prenatal care. Her pregnancy has been complicated by:   gestational diabetes .      She describes fetal movement as normal.  She denies rupture of membranes.  She denies vaginal bleeding. She is not feeling contractions.          Objective   There are no problems to display for this patient.       OB History    Para Term  AB Living   1 0 0 0 0 0   SAB IAB Ectopic Molar Multiple Live Births   0 0 0 0 0 0      # Outcome Date GA Lbr Cristino/2nd Weight Sex Type Anes PTL Lv   1 Current                 Past Medical History:   Diagnosis Date    Gestational diabetes        Past Surgical History:   Procedure Laterality Date    SPINE SURGERY         No current facility-administered medications on file prior to encounter.     Current Outpatient Medications on File Prior to Encounter   Medication Sig Dispense Refill    prenatal vitamin (prenatal, CLASSIC, vitamin) tablet Take  by mouth Daily.         Allergies   Allergen Reactions    Other Rash     UTI  "medication (prescribed by hospital)    UTI medication (prescribed by hospital)   UTI medication (prescribed by hospital)       History reviewed. No pertinent family history.    Social History     Socioeconomic History    Marital status: Single   Tobacco Use    Smoking status: Never    Smokeless tobacco: Never   Vaping Use    Vaping status: Never Used   Substance and Sexual Activity    Alcohol use: Not Currently    Drug use: Never    Sexual activity: Yes           Review of Systems   Constitutional: Negative.    HENT: Negative.     Respiratory: Negative.     Cardiovascular: Negative.    Gastrointestinal: Negative.    Genitourinary: Negative.    Musculoskeletal:  Positive for back pain.   Neurological: Negative.         PHYSICAL EXAM:      VITAL SIGNS:  Vitals:    03/19/24 1434   Temp: 98.7 °F (37.1 °C)   TempSrc: Oral   Weight: 94.3 kg (208 lb)   Height: 172.7 cm (68\")        FHT:                   Baseline:  140 BPM  Variability:  Moderate = 6 - 25 BPM  Accelerations:  15 x 15 accelerations present     Decelerations:  absent  Contractions:   absent     Interpretation:    Reactive NST, CAT 1 tracing        PHYSICAL EXAM:    General: well developed; well nourished  no acute distress   Heart: Not performed.   Lungs  : breathing is unlabored     Abdomen: soft, non-tender; no masses       Cervix: was not checked.      Contractions: none        Extremities: peripheral pulses normal, no pedal edema, no clubbing or cyanosis      LABS AND TESTING ORDERED:  Uterine and fetal monitoring  Urinalysis  CBC, CMP    LAB RESULTS:    Recent Results (from the past 24 hour(s))   Urinalysis With Microscopic If Indicated (No Culture) - Urine, Clean Catch    Collection Time: 03/19/24  2:45 PM    Specimen: Urine, Clean Catch   Result Value Ref Range    Color, UA Yellow Yellow, Straw    Appearance, UA Cloudy (A) Clear    pH, UA 7.0 5.0 - 8.0    Specific Gravity, UA 1.017 1.005 - 1.030    Glucose, UA Negative Negative    Ketones, UA Negative " Negative    Bilirubin, UA Negative Negative    Blood, UA Negative Negative    Protein, UA Negative Negative    Leuk Esterase, UA Negative Negative    Nitrite, UA Negative Negative    Urobilinogen, UA 1.0 E.U./dL 0.2 - 1.0 E.U./dL   Comprehensive Metabolic Panel    Collection Time: 03/19/24  2:45 PM    Specimen: Arm, Left; Blood   Result Value Ref Range    Glucose 94 65 - 99 mg/dL    BUN 6 6 - 20 mg/dL    Creatinine 0.53 (L) 0.57 - 1.00 mg/dL    Sodium 137 136 - 145 mmol/L    Potassium 3.7 3.5 - 5.2 mmol/L    Chloride 104 98 - 107 mmol/L    CO2 23.0 22.0 - 29.0 mmol/L    Calcium 8.8 8.6 - 10.5 mg/dL    Total Protein 6.7 6.0 - 8.5 g/dL    Albumin 3.4 (L) 3.5 - 5.2 g/dL    ALT (SGPT) 13 1 - 33 U/L    AST (SGOT) <5 1 - 32 U/L    Alkaline Phosphatase 99 39 - 117 U/L    Total Bilirubin <0.2 0.0 - 1.2 mg/dL    Globulin 3.3 gm/dL    A/G Ratio 1.0 g/dL    BUN/Creatinine Ratio 11.3 7.0 - 25.0    Anion Gap 10.0 5.0 - 15.0 mmol/L    eGFR 135.1 >60.0 mL/min/1.73   CBC Auto Differential    Collection Time: 03/19/24  2:45 PM    Specimen: Arm, Left; Blood   Result Value Ref Range    WBC 9.48 3.40 - 10.80 10*3/mm3    RBC 3.74 (L) 3.77 - 5.28 10*6/mm3    Hemoglobin 9.2 (L) 12.0 - 15.9 g/dL    Hematocrit 28.7 (L) 34.0 - 46.6 %    MCV 76.7 (L) 79.0 - 97.0 fL    MCH 24.6 (L) 26.6 - 33.0 pg    MCHC 32.1 31.5 - 35.7 g/dL    RDW 13.5 12.3 - 15.4 %    RDW-SD 37.2 37.0 - 54.0 fl    MPV 9.3 6.0 - 12.0 fL    Platelets 280 140 - 450 10*3/mm3    Neutrophil % 79.3 (H) 42.7 - 76.0 %    Lymphocyte % 9.1 (L) 19.6 - 45.3 %    Monocyte % 7.1 5.0 - 12.0 %    Eosinophil % 1.3 0.3 - 6.2 %    Basophil % 0.1 0.0 - 1.5 %    Immature Grans % 3.1 (H) 0.0 - 0.5 %    Neutrophils, Absolute 7.53 (H) 1.70 - 7.00 10*3/mm3    Lymphocytes, Absolute 0.86 0.70 - 3.10 10*3/mm3    Monocytes, Absolute 0.67 0.10 - 0.90 10*3/mm3    Eosinophils, Absolute 0.12 0.00 - 0.40 10*3/mm3    Basophils, Absolute 0.01 0.00 - 0.20 10*3/mm3    Immature Grans, Absolute 0.29 (H) 0.00 -  "0.05 10*3/mm3    nRBC 0.0 0.0 - 0.2 /100 WBC   POC Glucose Once    Collection Time: 24  2:45 PM    Specimen: Blood   Result Value Ref Range    Glucose 96 70 - 130 mg/dL       Lab Results   Component Value Date    ABO B 10/30/2023    RH Positive 10/30/2023       No results found for: \"STREPGPB\"              Assessment & Plan     ASSESSMENT/PLAN:  Abdon Andres is a 23 y.o. female  at 27w5d who presented with: hyperglycemia, gestational diabetes          Final Impression:  Pregnancy at 27w5d  Reactive NST.  CAT 1 tracing  Hyperglycemia  Gestational diabetes  Maternal vital signs were reviewed and were unremarkable              Vitals:    24 1434   Temp: 98.7 °F (37.1 °C)   TempSrc: Oral   Weight: 94.3 kg (208 lb)   Height: 172.7 cm (68\")       No results found for: \"STREPGPB\"  Lab Results   Component Value Date    ABO B 10/30/2023    RH Positive 10/30/2023         PLAN:     Both POCT glucose fingerstick and blood draw confirm normal blood glucose levels  Patient discharged home.  She will continue to keep log of her blood glucose for review during her outpatient office appointments.    I have spent 30 minutes including face to face time with the patient, greater than 50% in discussion of the diagnosis (counseling) and/or coordination of care.     Abbi Best MD  3/19/2024  15:26 EDT  OB Hospitalist  Phone:  [phone]  "

## 2024-03-21 ENCOUNTER — APPOINTMENT (OUTPATIENT)
Dept: PHYSICAL THERAPY | Facility: HOSPITAL | Age: 24
End: 2024-03-21
Payer: COMMERCIAL

## 2024-03-27 ENCOUNTER — TELEPHONE (OUTPATIENT)
Dept: OBSTETRICS AND GYNECOLOGY | Facility: CLINIC | Age: 24
End: 2024-03-27

## 2024-03-27 ENCOUNTER — ROUTINE PRENATAL (OUTPATIENT)
Dept: OBSTETRICS AND GYNECOLOGY | Facility: CLINIC | Age: 24
End: 2024-03-27
Payer: COMMERCIAL

## 2024-03-27 ENCOUNTER — TELEPHONE (OUTPATIENT)
Dept: OBSTETRICS AND GYNECOLOGY | Facility: CLINIC | Age: 24
End: 2024-03-27
Payer: COMMERCIAL

## 2024-03-27 VITALS — WEIGHT: 208 LBS | DIASTOLIC BLOOD PRESSURE: 77 MMHG | BODY MASS INDEX: 31.63 KG/M2 | SYSTOLIC BLOOD PRESSURE: 139 MMHG

## 2024-03-27 DIAGNOSIS — O24.419 GDM, CLASS A2: Primary | ICD-10-CM

## 2024-03-27 DIAGNOSIS — Z3A.28 28 WEEKS GESTATION OF PREGNANCY: Primary | ICD-10-CM

## 2024-03-27 DIAGNOSIS — O24.419 GDM, CLASS A2: ICD-10-CM

## 2024-03-27 LAB
GLUCOSE UR STRIP-MCNC: NEGATIVE MG/DL
PROT UR STRIP-MCNC: ABNORMAL MG/DL

## 2024-03-27 RX ORDER — PEN NEEDLE, DIABETIC 32GX 5/32"
1 NEEDLE, DISPOSABLE MISCELLANEOUS 2 TIMES DAILY
Qty: 120 EACH | Refills: 1 | Status: SHIPPED | OUTPATIENT
Start: 2024-03-27 | End: 2024-03-28 | Stop reason: SDUPTHER

## 2024-03-27 RX ORDER — BLOOD-GLUCOSE METER
EACH MISCELLANEOUS
COMMUNITY
Start: 2024-03-15

## 2024-03-27 NOTE — TELEPHONE ENCOUNTER
I called Abdon Andres to discuss glucose results she sent on [employer] yesterday. Glucose is not at goal. I have reviewed results with Dr Moeller. Discussed indications for insulin use at this time. Discussed uses and side effects. Continue to test 4 times a day and send glucose log in one week.    Starting lantus BID, 10 units AM, 12 units PM    Delia Guzman, SINTIA  3/27/24  10:38am

## 2024-03-27 NOTE — PROGRESS NOTES
CC: Obstetric visit    HPI: 23-year-old  1 para 0 presents at 28-6/7 weeks for her obstetric visit.  Her baby is moving actively.  She complains of reflux symptoms.  She was prescribed insulin but has not yet picked it up.    Review of systems    This is negative for fever or chills.  Negative for nausea and vomiting.  Positive for reflux.    Physical examination    The abdomen is soft and gravid.  There is no epigastric tenderness.  No fundal tenderness.  No CVA tenderness.  No suprapubic tenderness.  Extremities are equal in size    Assessment and plan    1.  Intrauterine pregnancy at 28-6/7 weeks  2.  Gestational diabetes, now requiring insulin.  I counseled the patient regarding portance of taking her.  I also discussed the importance diligent maintenance of glycemic log so we can assess effectiveness.  The patient reports that she will  her insulin today and will do a better job of monitoring and recording her sugars.  Will reassess this next week.  Starting at 32 weeks, we will begin  testing.  3  GERD.  Counseled and questions answered.  I recommend Tums.  If this is not effective, pepcid could be added.

## 2024-03-27 NOTE — TELEPHONE ENCOUNTER
Pt states you are wanting her to get an US with her next OB visit. Can you please confirm if this is correct? Thank you!

## 2024-03-28 ENCOUNTER — TELEPHONE (OUTPATIENT)
Dept: OBSTETRICS AND GYNECOLOGY | Facility: CLINIC | Age: 24
End: 2024-03-28
Payer: COMMERCIAL

## 2024-03-28 ENCOUNTER — HOSPITAL ENCOUNTER (OUTPATIENT)
Dept: PHYSICAL THERAPY | Facility: HOSPITAL | Age: 24
Setting detail: THERAPIES SERIES
Discharge: HOME | End: 2024-03-28
Payer: COMMERCIAL

## 2024-03-28 DIAGNOSIS — O26.899 PREGNANCY RELATED LOW BACK PAIN, ANTEPARTUM: Primary | ICD-10-CM

## 2024-03-28 DIAGNOSIS — O26.892 PELVIC PAIN AFFECTING PREGNANCY IN SECOND TRIMESTER, ANTEPARTUM: ICD-10-CM

## 2024-03-28 DIAGNOSIS — R10.2 PELVIC PAIN AFFECTING PREGNANCY IN SECOND TRIMESTER, ANTEPARTUM: ICD-10-CM

## 2024-03-28 DIAGNOSIS — O24.419 GDM, CLASS A2: ICD-10-CM

## 2024-03-28 DIAGNOSIS — M54.50 PREGNANCY RELATED LOW BACK PAIN, ANTEPARTUM: Primary | ICD-10-CM

## 2024-03-28 PROCEDURE — 97530 THERAPEUTIC ACTIVITIES: CPT

## 2024-03-28 PROCEDURE — 97140 MANUAL THERAPY 1/> REGIONS: CPT

## 2024-03-28 PROCEDURE — 97110 THERAPEUTIC EXERCISES: CPT

## 2024-03-28 RX ORDER — PEN NEEDLE, DIABETIC 32GX 5/32"
1 NEEDLE, DISPOSABLE MISCELLANEOUS 2 TIMES DAILY
Qty: 120 EACH | Refills: 1 | Status: SHIPPED | OUTPATIENT
Start: 2024-03-28

## 2024-03-28 NOTE — TELEPHONE ENCOUNTER
Not sure why she did not receive, this was sent yesterday with the insulin Rx. I did resend this morning. -Delia

## 2024-03-28 NOTE — THERAPY PROGRESS REPORT/RE-CERT
Outpatient Physical Therapy Ortho Progress Note  Psychiatric     Patient Name: Abdon Andres  : 2000  MRN: [id number]  Today's Date: 3/28/2024      Visit Date: 2024    Visit Dx:    ICD-10-CM ICD-9-CM   1. Pregnancy related low back pain, antepartum  O26.899 646.83    M54.50 724.2   2. Pelvic pain affecting pregnancy in second trimester, antepartum  O26.892 646.83    R10.2 625.9       There is no problem list on file for this patient.       Past Medical History:   Diagnosis Date    Gestational diabetes         Past Surgical History:   Procedure Laterality Date    SPINE SURGERY          PT Ortho       Row Name 24 0800       Posture/Observations    Alignment Options Lumbar lordosis;Rounded shoulders  -RS    Rounded Shoulders Increased  -RS    Lumbar lordosis Mild;Increased  -RS       MMT Right Lower Ext    Rt Hip Flexion MMT, Gross Movement (4/5) good  -RS    Rt Hip ABduction MMT, Gross Movement (4-/5) good minus  -RS    Rt Hip Internal (Medial) Rotation MMT, Gross Movement (4-/5) good minus  -RS    Rt Hip External (Lateral) Rotation MMT, Gross Movement (4-/5) good minus  -RS    Rt Knee Extension MMT, Gross Movement (4/5) good  -RS    Rt Knee Flexion MMT, Gross Movement (4/5) good  -RS    Rt Ankle Plantarflexion MMT, Gross Movement (4/5) good  -RS    Rt Ankle Dorsiflexion MMT, Gross Movement (4/5) good  -RS       MMT Left Lower Ext    Lt Hip Flexion MMT, Gross Movement (4/5) good  -RS    Lt Hip ABduction MMT, Gross Movement (4-/5) good minus  -RS    Lt Hip Internal (Medial) Rotation MMT, Gross Movement (4-/5) good minus  -RS    Lt Hip External (Lateral) Rotation MMT, Gross Movement (4-/5) good minus  -RS    Lt Knee Extension MMT, Gross Movement (4/5) good  -RS    Lt Knee Flexion MMT, Gross Movement (4/5) good  -RS    Lt Ankle Plantarflexion MMT, Gross Movement (4/5) good  -RS    Lt Ankle Dorsiflexion MMT, Gross Movement (4/5) good  -RS              User Key  (r) = Recorded By, (t) = Taken  By, (c) = Cosigned By      Initials Name Provider Type    Salma Eagle PT Physical Therapist                                 PT Assessment/Plan       Row Name 03/28/24 0900          PT Assessment    Functional Limitations Impaired gait;Limitation in home management;Limitations in community activities;Performance in leisure activities;Performance in self-care ADL;Performance in work activities  -RS     Impairments Balance;Impaired muscle length;Impaired muscle power;Impaired muscle endurance;Range of motion;Posture;Peripheral nerve integrity;Pain;Muscle strength  -RS     Assessment Comments The pt is currently 29 weeks pregnant and reports about 50% improvement in pain since starting PT however remains limited in tolerance for standing and walking. She reports poor to fair compliance with HEP, encouraged improved compliance with current program, did not update program. She has met or partially met 2/2 StG and 0/4 LTG however is progressing toward remaining goals. She reports improvement in tolerance for transitional positions if she is aware of body mechanics. Continued current program focused on lumbopelvic stability and pain management. Plan to take a 4 weeks break focused on IND HEP compliance and for pt to return for HEP adjustment and education regarding labor prep exercises and postpartum plan.  -RS        PT Plan    PT Plan Comments consider side steps, STS, labor prep education/HEP  -RS               User Key  (r) = Recorded By, (t) = Taken By, (c) = Cosigned By      Initials Name Provider Type    Salma Eagle PT Physical Therapist                       OP Exercises       Row Name 03/28/24 0800             Subjective    Subjective Comments Pt reports her hips arent hurting as much today but they have been hurting, she hasn't been doing her HEP as much as prescribed, she does think PT has helped overall. She is being put on insulin for her diabetes but has not started yet due to lack of  needles  -RS         Total Minutes    69540 - PT Therapeutic Exercise Minutes 15  -RS      56236 - PT Therapeutic Activity Minutes 8  -RS      72531 - PT Manual Therapy Minutes 15  -RS         Exercise 1    Exercise Name 1 review of goals, progress, ortho  -RS         Exercise 2    Exercise Name 2 PPT supine  -RS      Cueing 2 Verbal;Demo  -RS      Reps 2 15  -RS      Time 2 5  -RS         Exercise 3    Exercise Name 3 hip hikes supine  -RS      Cueing 3 Verbal;Demo  -RS      Reps 3 15ea  -RS         Exercise 4    Exercise Name 4 seated ball roll out  -RS      Cueing 4 Verbal;Demo  -RS      Reps 4 10  -RS      Time 4 5  -RS         Exercise 5    Exercise Name 5 sidelying thoracic rotation  -RS      Reps 5 10ea  -RS         Exercise 6    Exercise Name 6 sl clamshell  -RS      Cueing 6 Verbal;Demo  -RS      Reps 6 20  -RS      Additional Comments RTB  -RS         Exercise 7    Exercise Name 7 piriformis stretch  -RS      Cueing 7 Verbal;Demo  -RS      Reps 7 3  -RS      Time 7 20  -RS         Exercise 8    Exercise Name 8 seated sciatic nn glide  -RS      Cueing 8 --  -RS      Reps 8 --  -RS                User Key  (r) = Recorded By, (t) = Taken By, (c) = Cosigned By      Initials Name Provider Type    RS Salma Goodman, PT Physical Therapist                             Manual Rx (Last 36 Hours)       Manual Treatments       Row Name 03/28/24 0800             Total Minutes    18431 - PT Manual Therapy Minutes 15  -RS         Manual Rx 1    Manual Rx 1 Location L hip/lumbar ES STM  -RS      Manual Rx 1 Type sidelying pillow between knees  -RS         Manual Rx 2    Manual Rx 2 Location lumbar distract in HL, gapping in R sidelying  -RS                User Key  (r) = Recorded By, (t) = Taken By, (c) = Cosigned By      Initials Name Provider Type    RS Salma Goodman, PT Physical Therapist                     PT OP Goals       Row Name 03/28/24 0800          PT Short Term Goals    STG Date to Achieve 04/05/24  -RS      STG 1 The pt will demonstrate IND with transitional position performance without pain greater than 4/10 to facilitate improved bed mobility.  -RS     STG 1 Progress Met  -RS     STG 1 Progress Comments 2/10 if she does it the way PT has instructed  -RS     STG 2 The pt will demonstrate IND and compliant with HEP focused on lumbopelvic stability and pain management.  -RS     STG 2 Progress Partially Met  -RS     STG 2 Progress Comments pt reports she has not been doing HEP as much as prescribed, doing 1x per week  -RS        Long Term Goals    LTG Date to Achieve 05/20/24  -RS     LTG 1 The pt will report understanding of labor prep from a pelvic floor perspective.  -RS     LTG 1 Progress Ongoing  -RS     LTG 1 Progress Comments no addressed yet  -RS     LTG 2 The pt will report at most 4/10 back pain at the end of a work day to facilitate improved work performance.  -RS     LTG 2 Progress Ongoing;Progressing  -RS     LTG 2 Progress Comments 7/10 at end of work day  -RS     LTG 3 The pt will report at least 50% reduction in LE pain with prolonged positioning with use of HEP and compressionsocks to facilitate improved activity tolerance.  -RS     LTG 3 Progress Ongoing  -RS     LTG 3 Progress Comments 305  -RS     LTG 4 The pt will report no JANICE with laugh/cough with use of knack for improved pressure management.  -RS     LTG 4 Progress Ongoing;Progressing  -RS     LTG 4 Progress Comments some of the time improved  -RS               User Key  (r) = Recorded By, (t) = Taken By, (c) = Cosigned By      Initials Name Provider Type    RS Salma Goodman PT Physical Therapist                    Therapy Education  Education Details: POC, HEP  Given: HEP  Program: Reinforced  How Provided: Verbal, Demonstration  Provided to: Patient  Level of Understanding: Verbalized, Demonstrated              Time Calculation:   Start Time: 0836  Stop Time: 0916  Time Calculation (min): 40 min  Timed Charges  25093 - PT Therapeutic  Exercise Minutes: 15  70591 - PT Manual Therapy Minutes: 15  49268 - PT Therapeutic Activity Minutes: 8  Total Minutes  Timed Charges Total Minutes: 38   Total Minutes: 38  Therapy Charges for Today       Code Description Service Date Service Provider Modifiers Qty    85621406057  PT THER PROC EA 15 MIN 3/28/2024 Salma Goodman, PT GP 1    [id number]  PT THERAPEUTIC ACT EA 15 MIN 3/28/2024 Salma Goodman, PT GP 1    [id number]  PT MANUAL THERAPY EA 15 MIN 3/28/2024 Salma Goodman, PT GP 1                      Salma Goodman PT  3/28/2024

## 2024-03-28 NOTE — TELEPHONE ENCOUNTER
Pt called stating she received her insulin yesterday but didn't receive the needles. Pt wanted to know if you could please send in the needles to her pharmacy on file?    Pharmacy- [id number] - [place], [place] - [address] AT [facility] & [facility] [phone] [facility] [phone] FX    Please advise, thank you

## 2024-04-03 ENCOUNTER — ROUTINE PRENATAL (OUTPATIENT)
Dept: OBSTETRICS AND GYNECOLOGY | Facility: CLINIC | Age: 24
End: 2024-04-03
Payer: COMMERCIAL

## 2024-04-03 VITALS — DIASTOLIC BLOOD PRESSURE: 85 MMHG | BODY MASS INDEX: 31.63 KG/M2 | SYSTOLIC BLOOD PRESSURE: 146 MMHG | WEIGHT: 208 LBS

## 2024-04-03 DIAGNOSIS — Z3A.29 29 WEEKS GESTATION OF PREGNANCY: Primary | ICD-10-CM

## 2024-04-03 DIAGNOSIS — O24.419 GDM, CLASS A2: ICD-10-CM

## 2024-04-03 LAB
GLUCOSE UR STRIP-MCNC: NEGATIVE MG/DL
PROT UR STRIP-MCNC: ABNORMAL MG/DL

## 2024-04-03 NOTE — PROGRESS NOTES
CC: Obstetric visit    HPI: 23-year-old  1 para 0 presents at 29-6/7 weeks for her obstetric visit.  Her baby is moving actively.  She has started insulin.  We reviewed her sugars today.  Also, she has started Tums for reflux and that has been very helpful.    Physical examination    The abdomen is soft and gravid.  There is no epigastric tenderness.  No fundal tenderness.  No CVA tenderness.  No suprapubic tenderness.  Extremities are equal in size    Assessment and plan    1.  Intrauterine pregnancy at 29-6/7 weeks  2.  Gestational diabetes, A2.  The patient has begun insulin.  Most of her sugars are in range, but she does have some elevated values.  Insulin has been increased.  The patient will continue monitoring her sugars.  This will be reassessed next week.  3.  The patient has been evaluated by a dentist and needs a root canal.  This has been scheduled for 2 weeks.  Counseled.  She is taking amoxicillin.  4.  GERD.  The patient is taking Tums and this is helping.

## 2024-04-10 ENCOUNTER — ROUTINE PRENATAL (OUTPATIENT)
Dept: OBSTETRICS AND GYNECOLOGY | Facility: CLINIC | Age: 24
End: 2024-04-10
Payer: COMMERCIAL

## 2024-04-10 ENCOUNTER — DOCUMENTATION (OUTPATIENT)
Dept: OBSTETRICS AND GYNECOLOGY | Facility: CLINIC | Age: 24
End: 2024-04-10
Payer: COMMERCIAL

## 2024-04-10 VITALS — DIASTOLIC BLOOD PRESSURE: 84 MMHG | BODY MASS INDEX: 31.63 KG/M2 | SYSTOLIC BLOOD PRESSURE: 130 MMHG | WEIGHT: 208 LBS

## 2024-04-10 DIAGNOSIS — O24.419 GDM, CLASS A2: ICD-10-CM

## 2024-04-10 DIAGNOSIS — Z34.93 THIRD TRIMESTER PREGNANCY: Primary | ICD-10-CM

## 2024-04-10 DIAGNOSIS — Z3A.30 30 WEEKS GESTATION OF PREGNANCY: ICD-10-CM

## 2024-04-10 DIAGNOSIS — N89.8 VAGINAL DISCHARGE: ICD-10-CM

## 2024-04-10 NOTE — PROGRESS NOTES
Abdon Andres  30w6d sent me her blood glucose log today via [employer].    Plan/Assessment  1) GDM, A2, third trimester    Glucose is not well controlled with current dose  of insulin  3 FBS above goal, several PP above goal. No results above 180. Lantus increased to 13 units AM nad 20 units PM  Continue to monitor glucose 4 times a day  Send blood glucose log to me again in one week  Instructed to call sooner if any questions or concerns    Update sent to Dr. Sajan Guzman, APRN  4/10/2024  08:42 EDT

## 2024-04-10 NOTE — PROGRESS NOTES
OB VISIT 30 WEEKS      Chief Complaint   Patient presents with    Routine Prenatal Visit         Abdon is a 23 y.o.  30w6d being seen today for her obstetrical visit.  Patient reports no complaints. Fetal movement: normal. The patient currently sees Dr. Moeller.       REVIEW OF SYSTEMS  Cramping/contractions: denies  Vaginal bleeding: denies  Fetal movement: present  Leaking of fluid: denies    Review of Systems   Eyes:  Negative for blurred vision, double vision and visual disturbance.   Gastrointestinal:  Negative for abdominal pain.   Neurological:  Negative for light-headedness and headache.   All other systems reviewed and are negative.      /84   Wt 94.3 kg (208 lb)   LMP 2023   BMI 31.63 kg/m²     Prenatal Assessment  Fetal Heart Rate: 134  Fundal Height (cm): 31 cm  Movement: Present  Presentation: Vertex  Edema  LLE Edema: None  RLE Edema: None  Facial Edema: None    Physical Exam  Constitutional:       General: She is awake.      Appearance: Normal appearance. She is well-developed and well-groomed.   HENT:      Head: Normocephalic and atraumatic.   Pulmonary:      Effort: Pulmonary effort is normal.   Musculoskeletal:      Cervical back: Normal range of motion.   Neurological:      General: No focal deficit present.      Mental Status: She is alert and oriented to person, place, and time.   Skin:     General: Skin is warm and dry.   Psychiatric:         Mood and Affect: Mood normal.         Behavior: Behavior normal. Behavior is cooperative.   Vitals reviewed.         ASSESSMENT/PLAN    Diagnoses and all orders for this visit:    1. Third trimester pregnancy (Primary)  -     POC Urinalysis Dipstick    2. 30 weeks gestation of pregnancy  -     POC Urinalysis Dipstick    3. GDM, class A2  -     POC Urinalysis Dipstick         FSBS reviewed: several higher blood sugars at the end of March,but all less than 140 postprandial since .   Reviewed this stage of pregnancy.  Problem list  updated.     Follow up in 1 weeks with Dr. Moeller.     I spent 15 minutes caring for Abdon on this date of service. This time includes time spent by me in the following activities: preparing for the visit, reviewing tests, obtaining and/or reviewing a separately obtained history, performing a medically appropriate examination and/or evaluation, counseling and educating the patient/family/caregiver, ordering medications, tests, or procedures, referring and communicating with other health care professionals, documenting information in the medical record, independently interpreting results and communicating that information with the patient/family/caregiver, and care coordination.     Eliz Quiroz CNM  4/10/2024  14:04 EDT

## 2024-04-14 LAB
A VAGINAE DNA VAG QL NAA+PROBE: ABNORMAL SCORE
BVAB2 DNA VAG QL NAA+PROBE: ABNORMAL SCORE
C ALBICANS DNA VAG QL NAA+PROBE: POSITIVE
C GLABRATA DNA VAG QL NAA+PROBE: NEGATIVE
C TRACH DNA VAG QL NAA+PROBE: NEGATIVE
MEGA1 DNA VAG QL NAA+PROBE: ABNORMAL SCORE
N GONORRHOEA DNA VAG QL NAA+PROBE: NEGATIVE
T VAGINALIS DNA VAG QL NAA+PROBE: NEGATIVE

## 2024-04-18 ENCOUNTER — ROUTINE PRENATAL (OUTPATIENT)
Dept: OBSTETRICS AND GYNECOLOGY | Facility: CLINIC | Age: 24
End: 2024-04-18
Payer: COMMERCIAL

## 2024-04-18 ENCOUNTER — HOSPITAL ENCOUNTER (EMERGENCY)
Facility: HOSPITAL | Age: 24
Discharge: HOME | End: 2024-04-18
Attending: EMERGENCY MEDICINE
Payer: COMMERCIAL

## 2024-04-18 VITALS — WEIGHT: 206 LBS | SYSTOLIC BLOOD PRESSURE: 145 MMHG | DIASTOLIC BLOOD PRESSURE: 85 MMHG | BODY MASS INDEX: 31.32 KG/M2

## 2024-04-18 VITALS
OXYGEN SATURATION: 100 % | TEMPERATURE: 97.6 F | HEIGHT: 68 IN | SYSTOLIC BLOOD PRESSURE: 123 MMHG | HEART RATE: 93 BPM | BODY MASS INDEX: 31.22 KG/M2 | DIASTOLIC BLOOD PRESSURE: 71 MMHG | RESPIRATION RATE: 18 BRPM | WEIGHT: 206 LBS

## 2024-04-18 DIAGNOSIS — O24.419 GDM, CLASS A2: ICD-10-CM

## 2024-04-18 DIAGNOSIS — R59.1 LYMPHADENOPATHY: Primary | ICD-10-CM

## 2024-04-18 DIAGNOSIS — Z3A.32 32 WEEKS GESTATION OF PREGNANCY: Primary | ICD-10-CM

## 2024-04-18 DIAGNOSIS — L29.9 ITCHING: ICD-10-CM

## 2024-04-18 DIAGNOSIS — Z3A.32 32 WEEKS GESTATION OF PREGNANCY: ICD-10-CM

## 2024-04-18 LAB
ANION GAP SERPL CALCULATED.3IONS-SCNC: 9.5 MMOL/L (ref 5–15)
ANISOCYTOSIS BLD QL: ABNORMAL
BUN SERPL-MCNC: 6 MG/DL (ref 6–20)
BUN/CREAT SERPL: 10.9 (ref 7–25)
CALCIUM SPEC-SCNC: 9.2 MG/DL (ref 8.6–10.5)
CHLORIDE SERPL-SCNC: 103 MMOL/L (ref 98–107)
CO2 SERPL-SCNC: 22.5 MMOL/L (ref 22–29)
CREAT SERPL-MCNC: 0.55 MG/DL (ref 0.57–1)
DEPRECATED RDW RBC AUTO: 38.3 FL (ref 37–54)
EGFRCR SERPLBLD CKD-EPI 2021: 133.9 ML/MIN/1.73
ERYTHROCYTE [DISTWIDTH] IN BLOOD BY AUTOMATED COUNT: 14.6 % (ref 12.3–15.4)
GLUCOSE SERPL-MCNC: 112 MG/DL (ref 65–99)
GLUCOSE UR STRIP-MCNC: NEGATIVE MG/DL
HCT VFR BLD AUTO: 28 % (ref 34–46.6)
HGB BLD-MCNC: 9 G/DL (ref 12–15.9)
LYMPHOCYTES # BLD MANUAL: 0.29 10*3/MM3 (ref 0.7–3.1)
LYMPHOCYTES NFR BLD MANUAL: 4 % (ref 5–12)
MCH RBC QN AUTO: 23.3 PG (ref 26.6–33)
MCHC RBC AUTO-ENTMCNC: 32.1 G/DL (ref 31.5–35.7)
MCV RBC AUTO: 72.5 FL (ref 79–97)
MICROCYTES BLD QL: ABNORMAL
MONOCYTES # BLD: 0.39 10*3/MM3 (ref 0.1–0.9)
NEUTROPHILS # BLD AUTO: 9 10*3/MM3 (ref 1.7–7)
NEUTROPHILS NFR BLD MANUAL: 92.9 % (ref 42.7–76)
NRBC BLD AUTO-RTO: 0 /100 WBC (ref 0–0.2)
PLAT MORPH BLD: NORMAL
PLATELET # BLD AUTO: 258 10*3/MM3 (ref 140–450)
PMV BLD AUTO: 9.9 FL (ref 6–12)
POIKILOCYTOSIS BLD QL SMEAR: ABNORMAL
POTASSIUM SERPL-SCNC: 3.6 MMOL/L (ref 3.5–5.2)
PROT UR STRIP-MCNC: NEGATIVE MG/DL
RBC # BLD AUTO: 3.86 10*6/MM3 (ref 3.77–5.28)
SMUDGE CELLS BLD QL SMEAR: ABNORMAL
SODIUM SERPL-SCNC: 135 MMOL/L (ref 136–145)
SPHEROCYTES BLD QL SMEAR: ABNORMAL
VARIANT LYMPHS NFR BLD MANUAL: 3 % (ref 19.6–45.3)
WBC NRBC COR # BLD AUTO: 9.69 10*3/MM3 (ref 3.4–10.8)

## 2024-04-18 PROCEDURE — 85007 BL SMEAR W/DIFF WBC COUNT: CPT | Performed by: EMERGENCY MEDICINE

## 2024-04-18 PROCEDURE — 36415 COLL VENOUS BLD VENIPUNCTURE: CPT

## 2024-04-18 PROCEDURE — 0502F SUBSEQUENT PRENATAL CARE: CPT | Performed by: OBSTETRICS & GYNECOLOGY

## 2024-04-18 PROCEDURE — 99283 EMERGENCY DEPT VISIT LOW MDM: CPT

## 2024-04-18 PROCEDURE — 85025 COMPLETE CBC W/AUTO DIFF WBC: CPT | Performed by: EMERGENCY MEDICINE

## 2024-04-18 PROCEDURE — 80048 BASIC METABOLIC PNL TOTAL CA: CPT | Performed by: EMERGENCY MEDICINE

## 2024-04-18 RX ORDER — SODIUM CHLORIDE 0.9 % (FLUSH) 0.9 %
10 SYRINGE (ML) INJECTION AS NEEDED
Status: DISCONTINUED | OUTPATIENT
Start: 2024-04-18 | End: 2024-04-18 | Stop reason: HOSPADM

## 2024-04-18 RX ORDER — AMOXICILLIN AND CLAVULANATE POTASSIUM 875; 125 MG/1; MG/1
1 TABLET, FILM COATED ORAL 2 TIMES DAILY
Qty: 14 TABLET | Refills: 0 | Status: SHIPPED | OUTPATIENT
Start: 2024-04-18 | End: 2024-04-25

## 2024-04-18 NOTE — PROGRESS NOTES
CC: Obstetric visit    HPI: 23-year-old  1 para 0 presents at 32-0/7 weeks for her obstetric visit.  Her baby is moving actively.  She been taking her new insulin dose and feels better.  She does complain of persistent pain at the site where a boil was diagnosed in her right groin.    Review of systems    This is negative for fever or chills.  Positive for groin pain.  Negative for nausea or vomiting.    Physical examination    The abdomen is soft and gravid.  There is no epigastric tenderness.  No fundal tenderness.  No CVA tenderness.  No suprapubic tenderness.  Pelvic examination reveals normal female external genitalia.  There is a 3 cm mass in the right groin which is mobile, rubbery and tender.  Erythema is absent.  Induration is absent.  Extremities are equal in size    Assessment and plan    1.  Intrauterine pregnancy at 32-0/7 weeks  2.  A2 gestational diabetes.  Sugars were reviewed and discussed with the patient.  Glycemic control has improved since insulin was increased.  Biophysical profile is 8 out of 8 with a normal amniotic fluid index.  Estimated fetal weight is in the 52nd percentile.  3.  Groin lump which is very painful.  On examination, this appears more to be a lymph node rather than a boil.  The patient reports that she is experienced swelling under the armpits as well.  I am concerned regarding unexplained adenopathy and I have recommended that the patient go to the emergency room where a more thorough workup can be completed immediately.  The patient agrees.

## 2024-04-18 NOTE — ED NOTES
PT to ER via PV from home for swollen lymph nodes in armpit and groin area x 2 wks. PT is 33wks pregnant

## 2024-04-18 NOTE — ED PROVIDER NOTES
EMERGENCY DEPARTMENT ENCOUNTER    Room Number:  40/40  Date of encounter:  4/18/2024  PCP: Provider, No Known  Historian: Patient  Relevant information and history provided by sources other than the patient will be included below and in the ED Course.  Review of pertinent past medical records may also be included in record below and ED Course.    HPI:  Chief Complaint: Enlarged lymph nodes  A complete HPI/ROS/PMH/PSH/SH/FH are unobtainable due to: Not applicable  Context: Abdon Andres is a 23 y.o. female who presents to the ED c/o patient states about 2 weeks ago started noticing enlarged lymph node in the right and left groin.  She was placed upon amoxicillin for recent root canal procedure.  She started the amoxicillin at about the same time she noticed this lymph node enlargement.  She has completed 1 week of amoxicillin and really did not notice much change in the lymph node enlargement in her right groin.  In her left groin the lymph node was enlarged but has resolved and returned to normal.  She states that she has always had chronic lymph node enlargement under her armpits bilaterally.  They will wax and wane in severity and tenderness.  She has had a history of boils in the past.  She does not think this is a boil.  She has had no fevers or chills.  There is been no drainage from the skin.  Is not aware of any trauma or injury.  She has no history of cancer or malignancy.  She is currently 32 weeks pregnant and she is G1, P0.  Denies any pelvic pain abdominal pain, vaginal bleeding, chest pain, shortness of breath.  She came to the emergency department today per the instruction of her observation gynecologist.        Previous Episodes: No  Current Symptoms: See above    MEDICAL HISTORY REVIEWED  I can see this patient was seen by the nurse practitioner for Dr. Townsend on 4/10/2024.  She is G1, P0 and at that time she was 30 weeks and 6 days pregnant.  There were no complaints.  Appears as if they did  a point-of-care urine dipstick on that evaluation.  And on this visit was instructed to follow-up with Dr. Townsend in 1 week which would have been April 17.  She saw Dr. Townsend today.  Dr. Townsend recommended because of unexplained adenopathy to go to the emergency department for further evaluation.      PAST MEDICAL HISTORY  Active Ambulatory Problems     Diagnosis Date Noted    No Active Ambulatory Problems     Resolved Ambulatory Problems     Diagnosis Date Noted    No Resolved Ambulatory Problems     Past Medical History:   Diagnosis Date    Gestational diabetes          PAST SURGICAL HISTORY  Past Surgical History:   Procedure Laterality Date    SPINE SURGERY           FAMILY HISTORY  History reviewed. No pertinent family history.      SOCIAL HISTORY  Social History     Socioeconomic History    Marital status: Single   Tobacco Use    Smoking status: Never    Smokeless tobacco: Never   Vaping Use    Vaping status: Never Used   Substance and Sexual Activity    Alcohol use: Not Currently    Drug use: Never    Sexual activity: Yes         ALLERGIES  Other        REVIEW OF SYSTEMS  Review of Systems     All systems reviewed and negative except for those discussed in HPI.       PHYSICAL EXAM    I have reviewed the triage vital signs and nursing notes.    ED Triage Vitals   Temp Heart Rate Resp BP SpO2   04/18/24 1156 04/18/24 1156 04/18/24 1156 04/18/24 1158 04/18/24 1156   97.6 °F (36.4 °C) 105 19 139/81 99 %      Temp src Heart Rate Source Patient Position BP Location FiO2 (%)   -- -- 04/18/24 1158 -- --     Sitting         GENERAL: Young female.  No acute distress.Vital signs on my initial evaluation unremarkable  HENT: nares patent  Head/neck/ face are symmetric without gross deformity, signs of trauma, or swelling  EYES: no scleral icterus, no conjunctival pallor.  Oropharyngeal exam is unremarkable.  No erythema or warmth.  On exam of her anterior and posterior neck I did not appreciate any obvious adenopathy or  tenderness or swelling.  NECK: Supple, no meningismus  CV: regular rhythm, regular rate with intact distal pulses.  RESPIRATORY: normal effort and no respiratory distress.  Clear to auscultation bilaterally  And palpation under her axilla bilaterally she does have very mild enlarged lymph nodes.  There is mild tenderness.  She again states that this is not uncommon for her.  ABDOMEN: soft and gravid with fundal height pretty consistent with about 32 weeks pregnancy.  There is no tenderness.  There is no uterine contraction.  To her right groin there is approximately about a 1 cm in length and large lymph node.  There is no change in the skin.  There is no fluctuance.  It appears more rubbery and seems more consistent with lymph node enlargement.  It is mildly tender to palpation.  There is no drainage.  To the left side of her groin I do not appreciate any adenopathy.  MUSCULOSKELETAL: no deformity.  Intact distal pulses that are equal strong and symmetric.  NEURO: alert and appropriate, moves all extremities, follows commands.  No focal motor or sensory changes  SKIN: warm, dry    Vital signs and nursing notes reviewed.        LAB RESULTS  Recent Results (from the past 24 hour(s))   POC Urinalysis Dipstick    Collection Time: 04/18/24 10:15 AM    Specimen: Urine   Result Value Ref Range    Glucose, UA Negative Negative mg/dL    Protein, POC Negative Negative mg/dL   Basic Metabolic Panel    Collection Time: 04/18/24 12:59 PM    Specimen: Arm, Right; Blood   Result Value Ref Range    Glucose 112 (H) 65 - 99 mg/dL    BUN 6 6 - 20 mg/dL    Creatinine 0.55 (L) 0.57 - 1.00 mg/dL    Sodium 135 (L) 136 - 145 mmol/L    Potassium 3.6 3.5 - 5.2 mmol/L    Chloride 103 98 - 107 mmol/L    CO2 22.5 22.0 - 29.0 mmol/L    Calcium 9.2 8.6 - 10.5 mg/dL    BUN/Creatinine Ratio 10.9 7.0 - 25.0    Anion Gap 9.5 5.0 - 15.0 mmol/L    eGFR 133.9 >60.0 mL/min/1.73   CBC Auto Differential    Collection Time: 04/18/24 12:59 PM    Specimen:  Arm, Right; Blood   Result Value Ref Range    WBC 9.69 3.40 - 10.80 10*3/mm3    RBC 3.86 3.77 - 5.28 10*6/mm3    Hemoglobin 9.0 (L) 12.0 - 15.9 g/dL    Hematocrit 28.0 (L) 34.0 - 46.6 %    MCV 72.5 (L) 79.0 - 97.0 fL    MCH 23.3 (L) 26.6 - 33.0 pg    MCHC 32.1 31.5 - 35.7 g/dL    RDW 14.6 12.3 - 15.4 %    RDW-SD 38.3 37.0 - 54.0 fl    MPV 9.9 6.0 - 12.0 fL    Platelets 258 140 - 450 10*3/mm3    nRBC 0.0 0.0 - 0.2 /100 WBC   Manual Differential    Collection Time: 04/18/24 12:59 PM    Specimen: Arm, Right; Blood   Result Value Ref Range    Neutrophil % 92.9 (H) 42.7 - 76.0 %    Lymphocyte % 3.0 (L) 19.6 - 45.3 %    Monocyte % 4.0 (L) 5.0 - 12.0 %    Neutrophils Absolute 9.00 (H) 1.70 - 7.00 10*3/mm3    Lymphocytes Absolute 0.29 (L) 0.70 - 3.10 10*3/mm3    Monocytes Absolute 0.39 0.10 - 0.90 10*3/mm3    Anisocytosis Mod/2+ None Seen    Microcytes Mod/2+ None Seen    Poikilocytes Slight/1+ None Seen    Spherocytes Slight/1+ None Seen    Smudge Cells Slight/1+ None Seen    Platelet Morphology Normal Normal       Ordered the above labs and independently reviewed the results.        RADIOLOGY  US Fetal Biophysical Profile;Without Non-Stress Testing    Result Date: 4/18/2024  Ultrasound was ordered due to insulin requiring gestational diabetes Biophysical profile is 8 out of 8 Amniotic fluid index is normal Estimated fetal weight is in the 52nd percentile No comparative data      I ordered the above noted radiological studies. Reviewed by me and discussed with radiologist.  See dictation for official radiology interpretation.      PROCEDURES    Procedures      MEDICATIONS GIVEN IN ER    Medications   sodium chloride 0.9 % flush 10 mL (has no administration in time range)         All labs have been independently reviewed by me.  All radiology studies have been reviewed by me and I discussed with radiologist dictating the report when indicated below.  All EKG's independently viewed and interpreted by me.  Discussion below  represents my analysis of pertinent findings related to patient's condition, differential diagnosis, treatment plan and final disposition.        PROGRESS, DATA ANALYSIS, CONSULTS, AND MEDICAL DECISION MAKING    This is a patient has had lymphadenopathy first in both the right and left groin and is persistent in the right groin.  She has had no fevers or chills.  I think this is unlikely a bacterial infection.  She was sent here by her gynecologist for further evaluation.  I will check lab work.  I will then communicate back to Dr. Townsend.  I informed patient there is a very good chance that we will not have a definitive answer of why she has lymphadenopathy.  I truly do not suspect that this is from a bacterial infection.  She also has had a history of lymphadenopathy in the past.  She is otherwise completely asymptomatic.  Informed patient of the treatment plan.  All questions answered at this time.      ED Course as of 04/18/24 1616   Thu Apr 18, 2024   1404 Hemoglobin(!): 9.0  1 month ago patient's hemoglobin was 9.2. [MM]   1404 Neutrophil Rel %(!): 92.9  Left shift which is nonspecific. [MM]   1434 I did communicate with Dr. Townsend.  Thinks it is appropriate to put her on a course of antibiotics.  He will continue to follow her.  We discussed the possibility of either Augmentin or Keflex.  Will go ahead and give her some Augmentin. [MM]      ED Course User Index  [MM] Dk Melgoza MD       AS OF 16:16 EDT VITALS:    BP - 123/71  HR - 93  TEMP - 97.6 °F (36.4 °C)  02 SATS - 100%    SOCIAL DETERMINANTS OF HEALTH THAT IMPACT OR LIMIT CARE (For example..Homelessness,safe discharge, inability to obtain care, follow up, or prescriptions):      DIAGNOSIS  Final diagnoses:   Lymphadenopathy   32 weeks gestation of pregnancy         DISPOSITION  DISCHARGE    Patient discharged in stable condition.    Reviewed implications of results, diagnosis, meds, responsibility to follow up, warning signs and symptoms of  possible worsening, potential complications and reasons to return to ER, including worsening of symptoms, worsening of pain, fever, any concerns..    Patient/Family voiced understanding of above instructions.    Discussed plan for discharge, as there is no emergent indication for admission. Pt/family is agreeable and understands need for follow up and repeat testing.  Pt is aware that discharge does not mean that nothing is wrong but it indicates no emergency is present that requires admission and they must continue care with follow-up as given below or physician of their choice.     FOLLOW-UP  Nelson Moeller MD  [address] [address]  [phone]      Call tomorrow and arrange follow-up next week.  Return if he develop any fever, worsening of pain, or any other concerns.         Medication List        New Prescriptions      amoxicillin-clavulanate 875-125 MG per tablet  Commonly known as: AUGMENTIN  Take 1 tablet by mouth 2 (Two) Times a Day for 7 days.            Changed      Insulin Glargine 100 UNIT/ML injection pen  Commonly known as: LANTUS SOLOSTAR  Inject 10 units under the skin each AM and 12 units under the skin each evening  What changed: additional instructions               Where to Get Your Medications        You can get these medications from any pharmacy    Bring a paper prescription for each of these medications  amoxicillin-clavulanate 875-125 MG per tablet                 DICTATED UTILIZING [employer] DICTATION    Note Disclaimer: At [facility], we believe that sharing information builds trust and better relationships. You are receiving this note because you recently visited [facility]. It is possible you will see health information before a provider has talked with you about it. This kind of information can be easy to misunderstand. To help you fully understand what it means for your health, we urge you to discuss this note with your provider.       Dk Melgoza,  MD  04/18/24 8109

## 2024-04-19 LAB
ALBUMIN SERPL-MCNC: 3.5 G/DL (ref 4–5)
ALBUMIN/GLOB SERPL: 1 {RATIO} (ref 1.2–2.2)
ALP SERPL-CCNC: 169 IU/L (ref 44–121)
ALT SERPL-CCNC: 29 IU/L (ref 0–32)
AST SERPL-CCNC: 22 IU/L (ref 0–40)
BILIRUB SERPL-MCNC: 0.2 MG/DL (ref 0–1.2)
BUN SERPL-MCNC: 6 MG/DL (ref 6–20)
BUN/CREAT SERPL: 10 (ref 9–23)
CALCIUM SERPL-MCNC: 9.5 MG/DL (ref 8.7–10.2)
CHLORIDE SERPL-SCNC: 101 MMOL/L (ref 96–106)
CO2 SERPL-SCNC: 21 MMOL/L (ref 20–29)
CREAT SERPL-MCNC: 0.58 MG/DL (ref 0.57–1)
EGFRCR SERPLBLD CKD-EPI 2021: 132 ML/MIN/1.73
GLOBULIN SER CALC-MCNC: 3.4 G/DL (ref 1.5–4.5)
GLUCOSE SERPL-MCNC: 66 MG/DL (ref 70–99)
POTASSIUM SERPL-SCNC: 4.2 MMOL/L (ref 3.5–5.2)
PROT SERPL-MCNC: 6.9 G/DL (ref 6–8.5)
SODIUM SERPL-SCNC: 135 MMOL/L (ref 134–144)

## 2024-04-20 LAB — BILE AC SERPL-SCNC: 6.5 UMOL/L (ref 0–10)

## 2024-04-24 ENCOUNTER — ROUTINE PRENATAL (OUTPATIENT)
Dept: OBSTETRICS AND GYNECOLOGY | Facility: CLINIC | Age: 24
End: 2024-04-24
Payer: COMMERCIAL

## 2024-04-24 VITALS — WEIGHT: 207 LBS | DIASTOLIC BLOOD PRESSURE: 78 MMHG | SYSTOLIC BLOOD PRESSURE: 120 MMHG | BODY MASS INDEX: 31.47 KG/M2

## 2024-04-24 DIAGNOSIS — Z3A.32 32 WEEKS GESTATION OF PREGNANCY: Primary | ICD-10-CM

## 2024-04-24 DIAGNOSIS — O24.419 GDM, CLASS A2: ICD-10-CM

## 2024-04-24 LAB
GLUCOSE UR STRIP-MCNC: NEGATIVE MG/DL
PROT UR STRIP-MCNC: NEGATIVE MG/DL

## 2024-04-24 PROCEDURE — 0502F SUBSEQUENT PRENATAL CARE: CPT | Performed by: OBSTETRICS & GYNECOLOGY

## 2024-04-24 NOTE — PROGRESS NOTES
CC: Obstetric visit    HPI: 23-year-old  1 para 0 presents at 32-6/7 weeks for her obstetric visit.  Her baby is moving actively.  She is tolerating her insulin.  She reports that her groin swelling has decreased and is no longer painful.    Review of systems    This is negative for fever or chills.  Positive for swelling in the groin.  Positive for episodes of dizziness.  Positive for abdominal pain in the upper abdomen after eating.    Physical examination    The abdomen is soft and gravid.  There is no epigastric tenderness.  No fundal tenderness.  No right upper quadrant tenderness.  No suprapubic tenderness.  Extremities are equal in size.  Right groin adenopathy has decreased in size and is no longer tender.    Assessment and plan    1.  Intrauterine pregnancy at 32-6/7 weeks  2.  A2 gestational diabetes.  I counseled the patient and reviewed her sugars.  Fastings and postprandials have been normal with the exception of after dinner.  The patient is working with Delia Guzman on this and insulin may be adjusted again.  Biophysical profile is 8 out of 8 with a normal amniotic fluid index.  3.  Groin adenopathy.  Counseled and questions answered.  Recent ER visit was reviewed and discussed with the patient.  She is completing her course of antibiotics.  She has 1 or 2 more days left.  Her physical examination has improved.  We will recheck this at the next visit.  4.  Episodes of dizziness while sitting or laying down.  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  Strategies to minimize this were discussed with the patient.

## 2024-04-25 ENCOUNTER — TELEPHONE (OUTPATIENT)
Dept: PHYSICAL THERAPY | Facility: HOSPITAL | Age: 24
End: 2024-04-25
Payer: COMMERCIAL

## 2024-04-25 NOTE — TELEPHONE ENCOUNTER
Spoke with patient regarding no show for today's appointment. Reminded pt of next appointment date and time, as well as reminded of 24 hour cancellation policy. Requested pt call back if unable to make next appointment.

## 2024-04-28 ENCOUNTER — HOSPITAL ENCOUNTER (EMERGENCY)
Facility: HOSPITAL | Age: 24
Discharge: HOME | End: 2024-04-28
Attending: OBSTETRICS & GYNECOLOGY | Admitting: OBSTETRICS & GYNECOLOGY
Payer: COMMERCIAL

## 2024-04-28 VITALS
RESPIRATION RATE: 15 BRPM | TEMPERATURE: 98.2 F | DIASTOLIC BLOOD PRESSURE: 64 MMHG | OXYGEN SATURATION: 100 % | SYSTOLIC BLOOD PRESSURE: 135 MMHG | BODY MASS INDEX: 31.52 KG/M2 | WEIGHT: 208 LBS | HEIGHT: 68 IN | HEART RATE: 70 BPM

## 2024-04-28 LAB
BILIRUB UR QL STRIP: NEGATIVE
CLARITY UR: CLEAR
COLOR UR: YELLOW
GLUCOSE BLDC GLUCOMTR-MCNC: 101 MG/DL (ref 70–130)
GLUCOSE UR STRIP-MCNC: NEGATIVE MG/DL
HGB UR QL STRIP.AUTO: NEGATIVE
KETONES UR QL STRIP: NEGATIVE
LEUKOCYTE ESTERASE UR QL STRIP.AUTO: NEGATIVE
NITRITE UR QL STRIP: NEGATIVE
PH UR STRIP.AUTO: 7 [PH] (ref 5–8)
PROT UR QL STRIP: NEGATIVE
SP GR UR STRIP: 1.01 (ref 1–1.03)
UROBILINOGEN UR QL STRIP: NORMAL

## 2024-04-28 PROCEDURE — 87086 URINE CULTURE/COLONY COUNT: CPT | Performed by: OBSTETRICS & GYNECOLOGY

## 2024-04-28 PROCEDURE — 59025 FETAL NON-STRESS TEST: CPT

## 2024-04-28 PROCEDURE — 81003 URINALYSIS AUTO W/O SCOPE: CPT | Performed by: OBSTETRICS & GYNECOLOGY

## 2024-04-28 PROCEDURE — 82948 REAGENT STRIP/BLOOD GLUCOSE: CPT

## 2024-04-28 PROCEDURE — 99284 EMERGENCY DEPT VISIT MOD MDM: CPT | Performed by: OBSTETRICS & GYNECOLOGY

## 2024-04-29 NOTE — OBED NOTES
NICKIE Note OB        Patient Name: Abdon Andres  YOB: 2000  MRN: [id number]  Admission Date: 2024 10:21 PM  Date of Service: 2024    Chief Complaint: Pelvic Pain (NICKIE - pt here for pelvic pain and pressure and sharp pain that radiates to vagina. +FM. Denies VB or LOF.)        Subjective     Abdon Andres is a 23 y.o. female  at 33w3d with Estimated Date of Delivery: 24 who presents with the chief complaint listed above.  She sees Nelson Moeller MD for her prenatal care. Her pregnancy has been complicated by:   GDMA2 .        She describes fetal movement as normal.  She denies rupture of membranes.  She denies vaginal bleeding. She is not feeling contractions.          Objective   There are no problems to display for this patient.       OB History    Para Term  AB Living   1 0 0 0 0 0   SAB IAB Ectopic Molar Multiple Live Births   0 0 0 0 0 0      # Outcome Date GA Lbr Cristino/2nd Weight Sex Type Anes PTL Lv   1 Current                 Past Medical History:   Diagnosis Date    Gestational diabetes     insulin dependent       Past Surgical History:   Procedure Laterality Date    SPINE SURGERY         No current facility-administered medications on file prior to encounter.     Current Outpatient Medications on File Prior to Encounter   Medication Sig Dispense Refill    ferrous sulfate 325 (65 FE) MG tablet Take 1 tablet by mouth Daily With Breakfast. 30 tablet 2    fluticasone (CUTIVATE) 0.005 % ointment APPLY TO AFFECTED AREAS OF THE BODY TWICE DAILY      glucose blood test strip Take blood sugar fasting (AM) and 2 hours after start of breakfast, lunch and dinner 100 each 12    hydrocortisone 2.5 % ointment APPLY TO AFFECTED AREA OF FACE TWICE DAILY      Lancets misc Use 1 each 4 (Four) Times a Day. 100 each 1    prenatal vitamin (prenatal, CLASSIC, vitamin) tablet Take  by mouth Daily.         Allergies   Allergen Reactions    Other Rash     UTI  "medication (prescribed by hospital)    UTI medication (prescribed by hospital)   UTI medication (prescribed by hospital)       History reviewed. No pertinent family history.    Social History     Socioeconomic History    Marital status: Single   Tobacco Use    Smoking status: Never    Smokeless tobacco: Never   Vaping Use    Vaping status: Never Used   Substance and Sexual Activity    Alcohol use: Not Currently    Drug use: Never    Sexual activity: Yes           Review of Systems   Constitutional: Negative.    HENT: Negative.     Respiratory: Negative.     Cardiovascular: Negative.    Gastrointestinal: Negative.    Genitourinary:  Positive for pelvic pain.   Neurological: Negative.         PHYSICAL EXAM:      VITAL SIGNS:  Vitals:    04/28/24 2233 04/28/24 2235 04/28/24 2240 04/28/24 2244   BP: 135/64      BP Location: Right arm      Patient Position: Sitting      Pulse: 73 68 62 67   Resp: 15      Temp: 98.2 °F (36.8 °C)      TempSrc: Oral      SpO2:  99% 100% 100%   Weight:       Height: 172.7 cm (68\")           FHT:                   Baseline:  135 BPM  Variability:  Moderate = 6 - 25 BPM  Accelerations:  15 x 15 accelerations present     Decelerations:  absent  Contractions:   absent     Interpretation:    Reactive NST, CAT 1 tracing        PHYSICAL EXAM:    General: well developed; well nourished  no acute distress   Heart: Not performed.   Lungs  : breathing is unlabored     Abdomen: soft, non-tender; no masses       Cervix: was checked (by RN): 0 cm / 0 % / -3  Cervical Dilation (cm): 0  Cervical Effacement: 0%  Fetal Station: -3  Cervical Consistency: firm  Cervical Position: posterior   Contractions: none        Extremities: peripheral pulses normal, no pedal edema, no clubbing or cyanosis      LABS AND TESTING ORDERED:  Uterine and fetal monitoring  Urinalysis      LAB RESULTS:    Recent Results (from the past 24 hour(s))   POC Glucose Once    Collection Time: 04/28/24 10:38 PM    Specimen: Blood   Result " "Value Ref Range    Glucose 101 70 - 130 mg/dL   Urinalysis With Culture If Indicated - Urine, Clean Catch    Collection Time: 24 10:49 PM    Specimen: Urine, Clean Catch   Result Value Ref Range    Color, UA Yellow Yellow, Straw    Appearance, UA Clear Clear    pH, UA 7.0 5.0 - 8.0    Specific Gravity, UA 1.011 1.005 - 1.030    Glucose, UA Negative Negative    Ketones, UA Negative Negative    Bilirubin, UA Negative Negative    Blood, UA Negative Negative    Protein, UA Negative Negative    Leuk Esterase, UA Negative Negative    Nitrite, UA Negative Negative    Urobilinogen, UA 1.0 E.U./dL 0.2 - 1.0 E.U./dL       Lab Results   Component Value Date    ABO B 10/30/2023    RH Positive 10/30/2023       No results found for: \"STREPGPB\"              Assessment & Plan     ASSESSMENT/PLAN:  Abdon Andres is a 23 y.o. female  at 33w3d who presented with: pelvic pressure          Final Impression:  Pregnancy at 33w3d  Reactive NST.  CAT 1 tracing  Pelvic pressure  Maternal vital signs were reviewed and were unremarkable              Vitals:    24 2233 24 2235 24 2240 24 2244   BP: 135/64      BP Location: Right arm      Patient Position: Sitting      Pulse: 73 68 62 67   Resp: 15      Temp: 98.2 °F (36.8 °C)      TempSrc: Oral      SpO2:  99% 100% 100%   Weight:       Height: 172.7 cm (68\")          No results found for: \"STREPGPB\"  Lab Results   Component Value Date    ABO B 10/30/2023    RH Positive 10/30/2023         PLAN:     Patient discharged home with labor precautions  Recommended trying maternity belt for comfort  She will keep her scheduled outpatient prenatal appointment.    I have spent 15 minutes including face to face time with the patient, greater than 50% in discussion of the diagnosis (counseling) and/or coordination of care.     Abbi Best MD  2024  23:03 EDT  OB Hospitalist  Phone:  [phone]  "

## 2024-04-30 LAB — BACTERIA SPEC AEROBE CULT: NO GROWTH

## 2024-05-01 ENCOUNTER — ROUTINE PRENATAL (OUTPATIENT)
Dept: OBSTETRICS AND GYNECOLOGY | Facility: CLINIC | Age: 24
End: 2024-05-01
Payer: COMMERCIAL

## 2024-05-01 VITALS — DIASTOLIC BLOOD PRESSURE: 70 MMHG | WEIGHT: 208 LBS | SYSTOLIC BLOOD PRESSURE: 118 MMHG | BODY MASS INDEX: 31.63 KG/M2

## 2024-05-01 DIAGNOSIS — Z3A.33 33 WEEKS GESTATION OF PREGNANCY: Primary | ICD-10-CM

## 2024-05-01 DIAGNOSIS — O24.419 GDM, CLASS A2: ICD-10-CM

## 2024-05-01 LAB
GLUCOSE UR STRIP-MCNC: NEGATIVE MG/DL
PROT UR STRIP-MCNC: NEGATIVE MG/DL

## 2024-05-02 DIAGNOSIS — O24.410 GDM (GESTATIONAL DIABETES MELLITUS), CLASS A1: ICD-10-CM

## 2024-05-02 RX ORDER — LANCETS
EACH MISCELLANEOUS
Qty: 100 EACH | Refills: 1 | Status: SHIPPED | OUTPATIENT
Start: 2024-05-02

## 2024-05-06 ENCOUNTER — TELEPHONE (OUTPATIENT)
Dept: OBSTETRICS AND GYNECOLOGY | Facility: CLINIC | Age: 24
End: 2024-05-06
Payer: COMMERCIAL

## 2024-05-06 ENCOUNTER — HOSPITAL ENCOUNTER (EMERGENCY)
Facility: HOSPITAL | Age: 24
Discharge: HOME | End: 2024-05-06
Attending: OBSTETRICS & GYNECOLOGY | Admitting: OBSTETRICS & GYNECOLOGY
Payer: COMMERCIAL

## 2024-05-06 VITALS
WEIGHT: 208 LBS | DIASTOLIC BLOOD PRESSURE: 54 MMHG | HEART RATE: 80 BPM | RESPIRATION RATE: 18 BRPM | TEMPERATURE: 98.1 F | OXYGEN SATURATION: 98 % | SYSTOLIC BLOOD PRESSURE: 103 MMHG | BODY MASS INDEX: 30.81 KG/M2 | HEIGHT: 69 IN

## 2024-05-06 LAB
BACTERIA UR QL AUTO: ABNORMAL /HPF
BILIRUB UR QL STRIP: NEGATIVE
CLARITY UR: CLEAR
COLOR UR: YELLOW
GLUCOSE UR STRIP-MCNC: NEGATIVE MG/DL
HGB UR QL STRIP.AUTO: ABNORMAL
HYALINE CASTS UR QL AUTO: ABNORMAL /LPF
KETONES UR QL STRIP: NEGATIVE
LEUKOCYTE ESTERASE UR QL STRIP.AUTO: ABNORMAL
NITRITE UR QL STRIP: NEGATIVE
PH UR STRIP.AUTO: 7.5 [PH] (ref 5–8)
PROT UR QL STRIP: ABNORMAL
RBC # UR STRIP: ABNORMAL /HPF
REF LAB TEST METHOD: ABNORMAL
SP GR UR STRIP: 1.01 (ref 1–1.03)
SQUAMOUS #/AREA URNS HPF: ABNORMAL /HPF
UROBILINOGEN UR QL STRIP: ABNORMAL
WBC # UR STRIP: ABNORMAL /HPF

## 2024-05-06 PROCEDURE — 87086 URINE CULTURE/COLONY COUNT: CPT | Performed by: OBSTETRICS & GYNECOLOGY

## 2024-05-06 PROCEDURE — 59025 FETAL NON-STRESS TEST: CPT

## 2024-05-06 PROCEDURE — 99284 EMERGENCY DEPT VISIT MOD MDM: CPT | Performed by: OBSTETRICS & GYNECOLOGY

## 2024-05-06 PROCEDURE — 81001 URINALYSIS AUTO W/SCOPE: CPT | Performed by: OBSTETRICS & GYNECOLOGY

## 2024-05-06 RX ORDER — DOCUSATE SODIUM 100 MG/1
100 CAPSULE, LIQUID FILLED ORAL 2 TIMES DAILY
COMMUNITY

## 2024-05-06 RX ORDER — BUTALBITAL, ACETAMINOPHEN AND CAFFEINE 50; 325; 40 MG/1; MG/1; MG/1
2 TABLET ORAL ONCE
Status: COMPLETED | OUTPATIENT
Start: 2024-05-06 | End: 2024-05-06

## 2024-05-06 RX ADMIN — BUTALBITAL, ACETAMINOPHEN, AND CAFFEINE 2 TABLET: 50; 325; 40 TABLET ORAL at 14:28

## 2024-05-07 LAB — BACTERIA SPEC AEROBE CULT: NO GROWTH

## 2024-05-08 ENCOUNTER — ROUTINE PRENATAL (OUTPATIENT)
Dept: OBSTETRICS AND GYNECOLOGY | Facility: CLINIC | Age: 24
End: 2024-05-08
Payer: COMMERCIAL

## 2024-05-08 VITALS — SYSTOLIC BLOOD PRESSURE: 131 MMHG | DIASTOLIC BLOOD PRESSURE: 77 MMHG | WEIGHT: 207 LBS | BODY MASS INDEX: 31.02 KG/M2

## 2024-05-08 DIAGNOSIS — K59.09 OTHER CONSTIPATION: ICD-10-CM

## 2024-05-08 DIAGNOSIS — Z34.93 THIRD TRIMESTER PREGNANCY: Primary | ICD-10-CM

## 2024-05-08 DIAGNOSIS — Z3A.34 34 WEEKS GESTATION OF PREGNANCY: ICD-10-CM

## 2024-05-08 LAB
GLUCOSE UR STRIP-MCNC: NEGATIVE MG/DL
LEUKOCYTE EST, POC: ABNORMAL
PROT UR STRIP-MCNC: ABNORMAL MG/DL

## 2024-05-08 RX ORDER — POLYETHYLENE GLYCOL 3350 17 G/17G
17 POWDER, FOR SOLUTION ORAL DAILY
Qty: 20 EACH | Refills: 1 | Status: SHIPPED | OUTPATIENT
Start: 2024-05-08

## 2024-05-08 RX ORDER — DOCUSATE SODIUM 100 MG/1
100 CAPSULE, LIQUID FILLED ORAL 2 TIMES DAILY
Qty: 60 CAPSULE | Refills: 1 | Status: SHIPPED | OUTPATIENT
Start: 2024-05-08

## 2024-05-09 ENCOUNTER — HOSPITAL ENCOUNTER (INPATIENT)
Facility: HOSPITAL | Age: 24
LOS: 4 days | Discharge: HOME | DRG: 832 | End: 2024-05-13
Attending: OBSTETRICS & GYNECOLOGY | Admitting: OBSTETRICS & GYNECOLOGY
Payer: COMMERCIAL

## 2024-05-09 PROBLEM — O14.93 PRE-ECLAMPSIA IN THIRD TRIMESTER: Status: ACTIVE | Noted: 2024-05-09

## 2024-05-09 PROBLEM — O24.414 INSULIN CONTROLLED GESTATIONAL DIABETES MELLITUS (GDM) IN THIRD TRIMESTER: Status: ACTIVE | Noted: 2024-05-09

## 2024-05-09 PROBLEM — Z3A.35 35 WEEKS GESTATION OF PREGNANCY: Status: ACTIVE | Noted: 2024-05-09

## 2024-05-09 LAB
ALBUMIN SERPL-MCNC: 3.5 G/DL (ref 3.5–5.2)
ALBUMIN/GLOB SERPL: 1.1 G/DL
ALP SERPL-CCNC: 186 U/L (ref 39–117)
ALT SERPL W P-5'-P-CCNC: 44 U/L (ref 1–33)
ANION GAP SERPL CALCULATED.3IONS-SCNC: 11 MMOL/L (ref 5–15)
AST SERPL-CCNC: 25 U/L (ref 1–32)
BACTERIA UR QL AUTO: ABNORMAL /HPF
BASOPHILS # BLD AUTO: 0.03 10*3/MM3 (ref 0–0.2)
BASOPHILS NFR BLD AUTO: 0.3 % (ref 0–1.5)
BILIRUB SERPL-MCNC: 0.3 MG/DL (ref 0–1.2)
BILIRUB UR QL STRIP: NEGATIVE
BLOODY SPECIMEN?: NO
BUN SERPL-MCNC: 6 MG/DL (ref 6–20)
BUN/CREAT SERPL: 9.1 (ref 7–25)
CALCIUM SPEC-SCNC: 9.1 MG/DL (ref 8.6–10.5)
CHLORIDE SERPL-SCNC: 107 MMOL/L (ref 98–107)
CLARITY UR: ABNORMAL
CO2 SERPL-SCNC: 21 MMOL/L (ref 22–29)
COLOR UR: YELLOW
CREAT SERPL-MCNC: 0.66 MG/DL (ref 0.57–1)
CREAT UR-MCNC: 68.6 MG/DL
DEPRECATED RDW RBC AUTO: 37.4 FL (ref 37–54)
EGFRCR SERPLBLD CKD-EPI 2021: 128.2 ML/MIN/1.73
EOSINOPHIL # BLD AUTO: 0.08 10*3/MM3 (ref 0–0.4)
EOSINOPHIL NFR BLD AUTO: 0.9 % (ref 0.3–6.2)
ERYTHROCYTE [DISTWIDTH] IN BLOOD BY AUTOMATED COUNT: 15.3 % (ref 12.3–15.4)
FIBRONECTIN FETAL VAG QL: NEGATIVE
GLOBULIN UR ELPH-MCNC: 3.3 GM/DL
GLUCOSE BLDC GLUCOMTR-MCNC: 140 MG/DL (ref 70–130)
GLUCOSE SERPL-MCNC: 77 MG/DL (ref 65–99)
GLUCOSE UR STRIP-MCNC: NEGATIVE MG/DL
HCT VFR BLD AUTO: 29.1 % (ref 34–46.6)
HGB BLD-MCNC: 9.1 G/DL (ref 12–15.9)
HGB UR QL STRIP.AUTO: NEGATIVE
HYALINE CASTS UR QL AUTO: ABNORMAL /LPF
IMM GRANULOCYTES # BLD AUTO: 0.06 10*3/MM3 (ref 0–0.05)
IMM GRANULOCYTES NFR BLD AUTO: 0.7 % (ref 0–0.5)
KETONES UR QL STRIP: ABNORMAL
LEUKOCYTE ESTERASE UR QL STRIP.AUTO: NEGATIVE
LYMPHOCYTES # BLD AUTO: 0.86 10*3/MM3 (ref 0.7–3.1)
LYMPHOCYTES NFR BLD AUTO: 9.8 % (ref 19.6–45.3)
MCH RBC QN AUTO: 21.5 PG (ref 26.6–33)
MCHC RBC AUTO-ENTMCNC: 31.3 G/DL (ref 31.5–35.7)
MCV RBC AUTO: 68.6 FL (ref 79–97)
MONOCYTES # BLD AUTO: 0.67 10*3/MM3 (ref 0.1–0.9)
MONOCYTES NFR BLD AUTO: 7.6 % (ref 5–12)
NEUTROPHILS NFR BLD AUTO: 7.1 10*3/MM3 (ref 1.7–7)
NEUTROPHILS NFR BLD AUTO: 80.7 % (ref 42.7–76)
NITRITE UR QL STRIP: NEGATIVE
PH UR STRIP.AUTO: 7.5 [PH] (ref 5–8)
PLATELET # BLD AUTO: 283 10*3/MM3 (ref 140–450)
PMV BLD AUTO: 10.2 FL (ref 6–12)
POTASSIUM SERPL-SCNC: 3.8 MMOL/L (ref 3.5–5.2)
PROT ?TM UR-MCNC: 97.8 MG/DL
PROT SERPL-MCNC: 6.8 G/DL (ref 6–8.5)
PROT UR QL STRIP: ABNORMAL
PROT/CREAT UR: 1425.7 MG/G CREA (ref 0–200)
RBC # BLD AUTO: 4.24 10*6/MM3 (ref 3.77–5.28)
RBC # UR STRIP: ABNORMAL /HPF
REF LAB TEST METHOD: ABNORMAL
SODIUM SERPL-SCNC: 139 MMOL/L (ref 136–145)
SP GR UR STRIP: 1.01 (ref 1–1.03)
SQUAMOUS #/AREA URNS HPF: ABNORMAL /HPF
UROBILINOGEN UR QL STRIP: ABNORMAL
WBC # UR STRIP: ABNORMAL /HPF
WBC NRBC COR # BLD AUTO: 8.8 10*3/MM3 (ref 3.4–10.8)

## 2024-05-09 PROCEDURE — 99285 EMERGENCY DEPT VISIT HI MDM: CPT | Performed by: OBSTETRICS & GYNECOLOGY

## 2024-05-09 PROCEDURE — 59025 FETAL NON-STRESS TEST: CPT

## 2024-05-09 PROCEDURE — 84156 ASSAY OF PROTEIN URINE: CPT | Performed by: OBSTETRICS & GYNECOLOGY

## 2024-05-09 PROCEDURE — 99222 1ST HOSP IP/OBS MODERATE 55: CPT | Performed by: OBSTETRICS & GYNECOLOGY

## 2024-05-09 PROCEDURE — 82948 REAGENT STRIP/BLOOD GLUCOSE: CPT

## 2024-05-09 PROCEDURE — 82731 ASSAY OF FETAL FIBRONECTIN: CPT | Performed by: OBSTETRICS & GYNECOLOGY

## 2024-05-09 PROCEDURE — 82570 ASSAY OF URINE CREATININE: CPT | Performed by: OBSTETRICS & GYNECOLOGY

## 2024-05-09 PROCEDURE — 87086 URINE CULTURE/COLONY COUNT: CPT | Performed by: OBSTETRICS & GYNECOLOGY

## 2024-05-09 PROCEDURE — 63710000001 INSULIN GLARGINE PER 5 UNITS: Performed by: OBSTETRICS & GYNECOLOGY

## 2024-05-09 PROCEDURE — 80053 COMPREHEN METABOLIC PANEL: CPT | Performed by: OBSTETRICS & GYNECOLOGY

## 2024-05-09 PROCEDURE — 25810000003 LACTATED RINGERS SOLUTION: Performed by: OBSTETRICS & GYNECOLOGY

## 2024-05-09 PROCEDURE — 59025 FETAL NON-STRESS TEST: CPT | Performed by: OBSTETRICS & GYNECOLOGY

## 2024-05-09 PROCEDURE — 81001 URINALYSIS AUTO W/SCOPE: CPT | Performed by: OBSTETRICS & GYNECOLOGY

## 2024-05-09 PROCEDURE — 85025 COMPLETE CBC W/AUTO DIFF WBC: CPT | Performed by: OBSTETRICS & GYNECOLOGY

## 2024-05-09 RX ORDER — FERROUS SULFATE 325(65) MG
325 TABLET ORAL
Status: DISCONTINUED | OUTPATIENT
Start: 2024-05-10 | End: 2024-05-13 | Stop reason: HOSPADM

## 2024-05-09 RX ORDER — SODIUM CHLORIDE 9 MG/ML
40 INJECTION, SOLUTION INTRAVENOUS AS NEEDED
Status: DISCONTINUED | OUTPATIENT
Start: 2024-05-09 | End: 2024-05-13 | Stop reason: HOSPADM

## 2024-05-09 RX ORDER — SODIUM CHLORIDE 0.9 % (FLUSH) 0.9 %
10 SYRINGE (ML) INJECTION AS NEEDED
Status: DISCONTINUED | OUTPATIENT
Start: 2024-05-09 | End: 2024-05-13 | Stop reason: HOSPADM

## 2024-05-09 RX ORDER — ONDANSETRON 4 MG/1
8 TABLET, ORALLY DISINTEGRATING ORAL EVERY 8 HOURS PRN
Status: DISCONTINUED | OUTPATIENT
Start: 2024-05-09 | End: 2024-05-13 | Stop reason: HOSPADM

## 2024-05-09 RX ORDER — SODIUM CHLORIDE 0.9 % (FLUSH) 0.9 %
10 SYRINGE (ML) INJECTION EVERY 12 HOURS SCHEDULED
Status: DISCONTINUED | OUTPATIENT
Start: 2024-05-09 | End: 2024-05-13 | Stop reason: HOSPADM

## 2024-05-09 RX ORDER — CALCIUM CARBONATE 500 MG/1
1 TABLET, CHEWABLE ORAL DAILY PRN
Status: DISCONTINUED | OUTPATIENT
Start: 2024-05-09 | End: 2024-05-13 | Stop reason: HOSPADM

## 2024-05-09 RX ORDER — DOCUSATE SODIUM 100 MG/1
100 CAPSULE, LIQUID FILLED ORAL 2 TIMES DAILY
Status: DISCONTINUED | OUTPATIENT
Start: 2024-05-09 | End: 2024-05-13 | Stop reason: HOSPADM

## 2024-05-09 RX ORDER — ONDANSETRON 2 MG/ML
4 INJECTION INTRAMUSCULAR; INTRAVENOUS EVERY 8 HOURS PRN
Status: DISCONTINUED | OUTPATIENT
Start: 2024-05-09 | End: 2024-05-13 | Stop reason: HOSPADM

## 2024-05-09 RX ORDER — HYDROXYZINE 50 MG/1
50 TABLET, FILM COATED ORAL NIGHTLY PRN
Status: DISCONTINUED | OUTPATIENT
Start: 2024-05-09 | End: 2024-05-13 | Stop reason: HOSPADM

## 2024-05-09 RX ORDER — PRENATAL VIT/IRON FUM/FOLIC AC 27MG-0.8MG
1 TABLET ORAL DAILY
Status: DISCONTINUED | OUTPATIENT
Start: 2024-05-10 | End: 2024-05-13 | Stop reason: HOSPADM

## 2024-05-09 RX ORDER — BISACODYL 10 MG
10 SUPPOSITORY, RECTAL RECTAL DAILY PRN
Status: DISCONTINUED | OUTPATIENT
Start: 2024-05-09 | End: 2024-05-13 | Stop reason: HOSPADM

## 2024-05-09 RX ORDER — LIDOCAINE HYDROCHLORIDE 10 MG/ML
0.5 INJECTION, SOLUTION INFILTRATION; PERINEURAL ONCE AS NEEDED
Status: DISCONTINUED | OUTPATIENT
Start: 2024-05-09 | End: 2024-05-13 | Stop reason: HOSPADM

## 2024-05-09 RX ORDER — DOCUSATE SODIUM 100 MG/1
100 CAPSULE, LIQUID FILLED ORAL 2 TIMES DAILY PRN
Status: DISCONTINUED | OUTPATIENT
Start: 2024-05-09 | End: 2024-05-09

## 2024-05-09 RX ORDER — POLYETHYLENE GLYCOL 3350 17 G/17G
17 POWDER, FOR SOLUTION ORAL DAILY
Status: DISCONTINUED | OUTPATIENT
Start: 2024-05-09 | End: 2024-05-13 | Stop reason: HOSPADM

## 2024-05-09 RX ORDER — ACETAMINOPHEN 325 MG/1
650 TABLET ORAL EVERY 4 HOURS PRN
Status: DISCONTINUED | OUTPATIENT
Start: 2024-05-09 | End: 2024-05-13 | Stop reason: HOSPADM

## 2024-05-09 RX ADMIN — INSULIN GLARGINE 16 UNITS: 100 INJECTION, SOLUTION SUBCUTANEOUS at 21:18

## 2024-05-09 RX ADMIN — ACETAMINOPHEN 325MG 650 MG: 325 TABLET ORAL at 20:44

## 2024-05-09 RX ADMIN — Medication 10 ML: at 21:22

## 2024-05-09 RX ADMIN — SODIUM CHLORIDE, POTASSIUM CHLORIDE, SODIUM LACTATE AND CALCIUM CHLORIDE 1000 ML: 600; 310; 30; 20 INJECTION, SOLUTION INTRAVENOUS at 15:21

## 2024-05-09 NOTE — NON STRESS TEST
Abdon Andres, samantha  at 35w0d with an VANE of 2024, by Last Menstrual Period, was seen at [facility] LABOR DELIVERY for a nonstress test.    Chief Complaint   Patient presents with    Contractions     35.0 cramping       Patient Active Problem List   Diagnosis    Pre-eclampsia in third trimester, without severe features    Insulin controlled gestational diabetes mellitus (GDM) in third trimester       Start Time: 1725  Stop Time: 1745    Interpretation A  Nonstress Test Interpretation A: Reactive  Interpretation B  Nonstress Test Interpretation B: Reactive

## 2024-05-09 NOTE — H&P
[facility]   HISTORY AND PHYSICAL    Patient Name:Abdon Andres  : 2000  MRN: [id number]  Primary Care Physician: Marlon, No Known  Date of admission: 2024    Subjective   Subjective     Chief Complaint:   Chief Complaint   Patient presents with   • Contractions     35.0 cramping     History of Present Illness   Abdon Andres is a 23 y.o. female  @ 35w0d - who presented with concern for  labor that was ruled out. While evaluating patient, noted to have findings concerning for pre-eclampsia and we decided to keep due to these concerns for further evaluation and treatment. Prenatal care with Dr. Moeller complicated by gestational diabetes, insulin dependant with good control per patient.  She notes good fetal movement, no prior blood pressure issues. No bleeding or leaking. No longer cramping. No headache, vision changes or RUQ pain. Of note she had elevation in BP at 7, 21 and 32 weeks but has been otherwise normal.      Review of Systems   Constitutional:  Negative for chills and fever.   Eyes:  Negative for visual disturbance.   Gastrointestinal:  Negative for abdominal pain.   Genitourinary:  Negative for dysuria, pelvic pain, vaginal bleeding and vaginal discharge.   Neurological:  Negative for headaches.   All other systems reviewed and are negative.        Personal History     Past Medical History:   Diagnosis Date   • Anxiety    • Chlamydia    • Gestational diabetes     insulin dependent       Past Surgical History:   Procedure Laterality Date   • SPINE SURGERY         Family History: Her family history is not on file.     Social History: She  reports that she has never smoked. She has never used smokeless tobacco. She reports that she does not currently use alcohol. She reports that she does not use drugs.    Home Medications:  Accu-Chek Guide, Accu-Chek Softclix Lancets, Insulin Glargine, Insulin Pen Needle, docusate sodium, ferrous sulfate, fluticasone, glucose  blood, hydrocortisone, polyethylene glycol, and prenatal vitamin    Allergies:  She is allergic to other.    Objective    Objective     Vitals:    Temp:  [98.9 °F (37.2 °C)] 98.9 °F (37.2 °C)  Heart Rate:  [70-84] 74  Resp:  [15] 15  BP: (114-148)/(60-81) 148/77    Physical Exam  Vitals reviewed. Exam conducted with a chaperone present.   Constitutional:       General: She is not in acute distress.     Appearance: She is well-developed and normal weight.   HENT:      Head: Normocephalic and atraumatic.   Eyes:      General:         Right eye: No discharge.         Left eye: No discharge.      Conjunctiva/sclera: Conjunctivae normal.   Neck:      Thyroid: No thyromegaly.   Cardiovascular:      Rate and Rhythm: Normal rate and regular rhythm.      Heart sounds: Normal heart sounds. No murmur heard.  Pulmonary:      Effort: Pulmonary effort is normal. No respiratory distress.      Breath sounds: Normal breath sounds.   Abdominal:      General: There is distension (EFW 5.5#).      Palpations: Abdomen is soft.      Tenderness: There is no abdominal tenderness.   Musculoskeletal:         General: Normal range of motion.      Cervical back: Normal range of motion and neck supple.   Lymphadenopathy:      Cervical: No cervical adenopathy.   Skin:     General: Skin is warm and dry.      Findings: No rash.   Neurological:      Mental Status: She is alert and oriented to person, place, and time.   Psychiatric:         Behavior: Behavior normal.         Thought Content: Thought content normal.         Judgment: Judgment normal.          Result Review      Lab Results   Component Value Date    WBC 8.80 05/09/2024    HGB 9.1 (L) 05/09/2024    HCT 29.1 (L) 05/09/2024    MCV 68.6 (L) 05/09/2024     05/09/2024     Lab Results   Component Value Date    GLUCOSE 77 05/09/2024    BUN 6 05/09/2024    CREATININE 0.66 05/09/2024    EGFRRESULT 132 04/18/2024    EGFR 128.2 05/09/2024    BCR 9.1 05/09/2024    K 3.8 05/09/2024    CO2  21.0 (L) 2024    CALCIUM 9.1 2024    PROTENTOTREF 6.9 2024    ALBUMIN 3.5 2024    BILITOT 0.3 2024    AST 25 2024    ALT 44 (H) 2024     Urine P/C 1425.7 (Elevated)     Assessment & Plan   Assessment / Plan     Brief Patient Summary:  Abdon Andres is a 23 y.o. female  @ 35w0d   1) Pre-eclampsia - based on mild systolic elevation today (also had elevations at 32 and 21 weeks as well as initial BP in office was diastolic of 90).  Of concern is that with evaluation for severe features she has an elevation of her ALT alone (but is not the 2x normal to indicate a severe feature).  She has not other concern for severe features with hypertensive urgency, elevated creatinine > 1.1, neurologic symptoms (HA, vision changes, seizure, stroke), or thrombocytopenia (platelets < 100k).  So will admit for serial BP, 24 hour urine and repeat labs.   2) Gest Diabetes - reports stable on Long acting PM dose of 16 units. Rx continued inpatient.     Active Hospital Problems:  Active Hospital Problems    Diagnosis    • **Pre-eclampsia in third trimester, without severe features    • Insulin controlled gestational diabetes mellitus (GDM) in third trimester      Plan:   Admit for extended observation   Collect 24 hour urine   Repeat labs in AM, sooner if issue  Deliver with severe features  MFM consult with ultrasound in AM (long term management)   Hold steroids for now given diabetes and gestational age      DVT prophylaxis:  Mechanical DVT prophylaxis orders are present.        Walter Cho MD  2024  17:45 EDT

## 2024-05-09 NOTE — OBED NOTES
[facility] Note OB        Patient Name: Abdon Andres  YOB: 2000  MRN: [id number]  Admission Date: 2024  2:36 PM  Date of Service: 2024    Chief Complaint: Contractions (35.0 cramping)        Subjective     Abdon Andres is a 23 y.o. female  at 35w0d with Estimated Date of Delivery: 24 who presents with the chief complaint of contractions since this morning.  At worst, she was feeling contractions 10 minutes apart.  She sees Nelson Moeller MD for her prenatal care. Her pregnancy has been complicated by:  [id number]    The patient denies current headache, visual changes, chest pain, RUQ/epigastric pain or nausea/vomiting.  She feels some shortness of breath but nothing out of the ordinary.    She describes fetal movement as normal.  She denies rupture of membranes.  She denies vaginal bleeding. She is feeling contractions.          Objective   Patient Active Problem List    Diagnosis     *Pre-eclampsia in third trimester, without severe features [O14.93]     Insulin controlled gestational diabetes mellitus (GDM) in third trimester [O24.414]         OB History    Para Term  AB Living   1 0 0 0 0 0   SAB IAB Ectopic Molar Multiple Live Births   0 0 0 0 0 0      # Outcome Date GA Lbr Cristino/2nd Weight Sex Type Anes PTL Lv   1 Current                 Past Medical History:   Diagnosis Date    Anxiety     Chlamydia     Gestational diabetes     insulin dependent       Past Surgical History:   Procedure Laterality Date    SPINE SURGERY         No current facility-administered medications on file prior to encounter.     Current Outpatient Medications on File Prior to Encounter   Medication Sig Dispense Refill    Accu-Chek Softclix Lancets lancets USE 1 LANCET TO GET BLOOD SAMPLE FOR BLOOD GLUCOSE TESTING FOUR TIMES DAILY 100 each 1    Blood Glucose Monitoring Suppl (Accu-Chek Guide) w/Device kit USE 1 KIT TO CHECK BLOOD GLUCOSE      docusate sodium (COLACE) 100 MG capsule  "Take 1 capsule by mouth 2 (Two) Times a Day.      ferrous sulfate 325 (65 FE) MG tablet Take 1 tablet by mouth Daily With Breakfast. 30 tablet 2    fluticasone (CUTIVATE) 0.005 % ointment APPLY TO AFFECTED AREAS OF THE BODY TWICE DAILY      glucose blood test strip Take blood sugar fasting (AM) and 2 hours after start of breakfast, lunch and dinner 100 each 12    hydrocortisone 2.5 % ointment APPLY TO AFFECTED AREA OF FACE TWICE DAILY      Insulin Glargine (LANTUS SOLOSTAR) 100 UNIT/ML injection pen Inject 16 units under the skin each AM and 20 units under the skin each evening 15 mL 3    Insulin Pen Needle (BD Pen Needle Melanie 2nd Gen) 32G X 4 MM misc Use 1 each 2 (Two) Times a Day. 120 each 1    prenatal vitamin (prenatal, CLASSIC, vitamin) tablet Take  by mouth Daily.         Allergies   Allergen Reactions    Other Rash     UTI medication (prescribed by hospital)    UTI medication (prescribed by hospital)   UTI medication (prescribed by hospital)       No family history on file.    Social History     Socioeconomic History    Marital status: Single   Tobacco Use    Smoking status: Never    Smokeless tobacco: Never   Vaping Use    Vaping status: Never Used   Substance and Sexual Activity    Alcohol use: Not Currently    Drug use: Never    Sexual activity: Yes           Review of Systems   Constitutional: Negative.    HENT: Negative.     Respiratory:  Positive for shortness of breath.    Cardiovascular: Negative.    Gastrointestinal:  Positive for abdominal pain.   Genitourinary: Negative.    Neurological: Negative.           PHYSICAL EXAM:      VITAL SIGNS:  Vitals:    05/09/24 1631 05/09/24 1641 05/09/24 1651 05/09/24 1726   BP: 140/60 114/62 128/77    BP Location:       Patient Position:       Pulse: 74 75 80    Resp:       Temp:       TempSrc:       Weight:       Height:    172.7 cm (68\")        FHT:                   Baseline:  140 BPM  Variability:  Moderate = 6 - 25 BPM  Accelerations:  15 x 15 accelerations " present     Decelerations:  absent  Contractions:   present     Interpretation:    Reactive NST, CAT 1 tracing        PHYSICAL EXAM:    General: well developed; well nourished  no acute distress   Heart: Not performed.   Lungs  : breathing is unlabored     Abdomen: soft, non-tender; no masses       Cervix: was checked (by RN): 1 cm / 70 % / -2  Cervical Dilation (cm): 1  Cervical Effacement: 70%  Fetal Station: -2  Cervical Consistency: firm  Cervical Position: posterior   Contractions: irregular        Extremities: trace pedal edema      LABS AND TESTING ORDERED:  Uterine and fetal monitoring  Urinalysis  CBC, CMP, urine protein/creatinine ratio    LAB RESULTS:    Recent Results (from the past 24 hour(s))   Urinalysis With Culture If Indicated - Urine, Clean Catch    Collection Time: 05/09/24  3:25 PM    Specimen: Urine, Clean Catch   Result Value Ref Range    Color, UA Yellow Yellow, Straw    Appearance, UA Cloudy (A) Clear    pH, UA 7.5 5.0 - 8.0    Specific Gravity, UA 1.013 1.005 - 1.030    Glucose, UA Negative Negative    Ketones, UA Trace (A) Negative    Bilirubin, UA Negative Negative    Blood, UA Negative Negative    Protein,  mg/dL (2+) (A) Negative    Leuk Esterase, UA Negative Negative    Nitrite, UA Negative Negative    Urobilinogen, UA 1.0 E.U./dL 0.2 - 1.0 E.U./dL   Comprehensive Metabolic Panel    Collection Time: 05/09/24  3:25 PM    Specimen: Blood   Result Value Ref Range    Glucose 77 65 - 99 mg/dL    BUN 6 6 - 20 mg/dL    Creatinine 0.66 0.57 - 1.00 mg/dL    Sodium 139 136 - 145 mmol/L    Potassium 3.8 3.5 - 5.2 mmol/L    Chloride 107 98 - 107 mmol/L    CO2 21.0 (L) 22.0 - 29.0 mmol/L    Calcium 9.1 8.6 - 10.5 mg/dL    Total Protein 6.8 6.0 - 8.5 g/dL    Albumin 3.5 3.5 - 5.2 g/dL    ALT (SGPT) 44 (H) 1 - 33 U/L    AST (SGOT) 25 1 - 32 U/L    Alkaline Phosphatase 186 (H) 39 - 117 U/L    Total Bilirubin 0.3 0.0 - 1.2 mg/dL    Globulin 3.3 gm/dL    A/G Ratio 1.1 g/dL    BUN/Creatinine Ratio  9.1 7.0 - 25.0    Anion Gap 11.0 5.0 - 15.0 mmol/L    eGFR 128.2 >60.0 mL/min/1.73   CBC Auto Differential    Collection Time: 05/09/24  3:25 PM    Specimen: Blood   Result Value Ref Range    WBC 8.80 3.40 - 10.80 10*3/mm3    RBC 4.24 3.77 - 5.28 10*6/mm3    Hemoglobin 9.1 (L) 12.0 - 15.9 g/dL    Hematocrit 29.1 (L) 34.0 - 46.6 %    MCV 68.6 (L) 79.0 - 97.0 fL    MCH 21.5 (L) 26.6 - 33.0 pg    MCHC 31.3 (L) 31.5 - 35.7 g/dL    RDW 15.3 12.3 - 15.4 %    RDW-SD 37.4 37.0 - 54.0 fl    MPV 10.2 6.0 - 12.0 fL    Platelets 283 140 - 450 10*3/mm3    Neutrophil % 80.7 (H) 42.7 - 76.0 %    Lymphocyte % 9.8 (L) 19.6 - 45.3 %    Monocyte % 7.6 5.0 - 12.0 %    Eosinophil % 0.9 0.3 - 6.2 %    Basophil % 0.3 0.0 - 1.5 %    Immature Grans % 0.7 (H) 0.0 - 0.5 %    Neutrophils, Absolute 7.10 (H) 1.70 - 7.00 10*3/mm3    Lymphocytes, Absolute 0.86 0.70 - 3.10 10*3/mm3    Monocytes, Absolute 0.67 0.10 - 0.90 10*3/mm3    Eosinophils, Absolute 0.08 0.00 - 0.40 10*3/mm3    Basophils, Absolute 0.03 0.00 - 0.20 10*3/mm3    Immature Grans, Absolute 0.06 (H) 0.00 - 0.05 10*3/mm3   Urinalysis, Microscopic Only - Urine, Clean Catch    Collection Time: 05/09/24  3:25 PM    Specimen: Urine, Clean Catch   Result Value Ref Range    RBC, UA 0-2 None Seen, 0-2 /HPF    WBC, UA 0-2 None Seen, 0-2 /HPF    Bacteria, UA 1+ (A) None Seen /HPF    Squamous Epithelial Cells, UA 7-12 (A) None Seen, 0-2 /HPF    Hyaline Casts, UA 3-6 None Seen /LPF    Methodology Manual Light Microscopy    Protein / Creatinine Ratio, Urine - Urine, Clean Catch    Collection Time: 05/09/24  3:25 PM    Specimen: Urine, Clean Catch   Result Value Ref Range    Protein/Creatinine Ratio, Urine 1,425.7 (H) 0.0 - 200.0 mg/G Crea    Creatinine, Urine 68.6 mg/dL    Total Protein, Urine 97.8 mg/dL   Fetal Fibronectin - Vaginal Fluid, Cervix    Collection Time: 05/09/24  3:29 PM    Specimen: Cervix; Vaginal Fluid   Result Value Ref Range    Fetal Fibronectin Negative Negative    Bloody  "Specimen? No        Lab Results   Component Value Date    ABO B 10/30/2023    RH Positive 10/30/2023       No results found for: \"STREPGPB\"              Assessment & Plan     ASSESSMENT/PLAN:  Abdon Andres is a 23 y.o. female  at 35w0d who presented with: Elevated ALT and protein/creatinine ratio          Final Impression:  Pregnancy at 35w0d  Reactive NST.  CAT 1 tracing  Elevated ALT and protein/creatinine ratio  Maternal vital signs were reviewed and were remarkable for one elevated blood pressure followed by normal blood pressure readings.             Vitals:    24 1631 24 1641 24 1651 24 1726   BP: 140/60 114/62 128/77    BP Location:       Patient Position:       Pulse: 74 75 80    Resp:       Temp:       TempSrc:       Weight:       Height:    172.7 cm (68\")       No results found for: \"STREPGPB\"  Lab Results   Component Value Date    ABO B 10/30/2023    RH Positive 10/30/2023         PLAN:     Patient's cervical exam is unchanged after 1 hour.    Patient will be admitted for serial blood pressures and repeated preeclampsia labs in the morning.    Plan discussed with patient and on-call physician.    I have spent 30 minutes including face to face time with the patient, greater than 50% in discussion of the diagnosis (counseling) and/or coordination of care.     Abbi Best MD  2024  17:27 EDT  OB Hospitalist  Phone:  [phone]  "

## 2024-05-10 ENCOUNTER — APPOINTMENT (OUTPATIENT)
Dept: ULTRASOUND IMAGING | Facility: HOSPITAL | Age: 24
DRG: 832 | End: 2024-05-10
Payer: COMMERCIAL

## 2024-05-10 LAB
ABO GROUP BLD: NORMAL
ALBUMIN SERPL-MCNC: 3.2 G/DL (ref 3.5–5.2)
ALBUMIN/GLOB SERPL: 0.9 G/DL
ALP SERPL-CCNC: 183 U/L (ref 39–117)
ALT SERPL W P-5'-P-CCNC: 40 U/L (ref 1–33)
ANION GAP SERPL CALCULATED.3IONS-SCNC: 10 MMOL/L (ref 5–15)
AST SERPL-CCNC: 25 U/L (ref 1–32)
BACTERIA SPEC AEROBE CULT: NORMAL
BILIRUB SERPL-MCNC: 0.3 MG/DL (ref 0–1.2)
BLD GP AB SCN SERPL QL: NEGATIVE
BUN SERPL-MCNC: 5 MG/DL (ref 6–20)
BUN/CREAT SERPL: 10.9 (ref 7–25)
CALCIUM SPEC-SCNC: 9 MG/DL (ref 8.6–10.5)
CHLORIDE SERPL-SCNC: 106 MMOL/L (ref 98–107)
CO2 SERPL-SCNC: 22 MMOL/L (ref 22–29)
COLLECT DURATION TIME UR: 24 HRS
CREAT SERPL-MCNC: 0.46 MG/DL (ref 0.57–1)
DEPRECATED RDW RBC AUTO: 37.1 FL (ref 37–54)
EGFRCR SERPLBLD CKD-EPI 2021: 139.8 ML/MIN/1.73
ERYTHROCYTE [DISTWIDTH] IN BLOOD BY AUTOMATED COUNT: 15.3 % (ref 12.3–15.4)
GLOBULIN UR ELPH-MCNC: 3.5 GM/DL
GLUCOSE BLDC GLUCOMTR-MCNC: 107 MG/DL (ref 70–130)
GLUCOSE BLDC GLUCOMTR-MCNC: 115 MG/DL (ref 70–130)
GLUCOSE BLDC GLUCOMTR-MCNC: 117 MG/DL (ref 70–130)
GLUCOSE BLDC GLUCOMTR-MCNC: 84 MG/DL (ref 70–130)
GLUCOSE BLDC GLUCOMTR-MCNC: 99 MG/DL (ref 70–130)
GLUCOSE SERPL-MCNC: 76 MG/DL (ref 65–99)
HCT VFR BLD AUTO: 29.4 % (ref 34–46.6)
HGB BLD-MCNC: 9.2 G/DL (ref 12–15.9)
LDH SERPL-CCNC: 192 U/L (ref 135–214)
MCH RBC QN AUTO: 21.4 PG (ref 26.6–33)
MCHC RBC AUTO-ENTMCNC: 31.3 G/DL (ref 31.5–35.7)
MCV RBC AUTO: 68.5 FL (ref 79–97)
PLATELET # BLD AUTO: 273 10*3/MM3 (ref 140–450)
PMV BLD AUTO: 9.7 FL (ref 6–12)
POTASSIUM SERPL-SCNC: 4 MMOL/L (ref 3.5–5.2)
PROT 24H UR-MRATE: 246 MG/24HOURS (ref 0–150)
PROT SERPL-MCNC: 6.7 G/DL (ref 6–8.5)
RBC # BLD AUTO: 4.29 10*6/MM3 (ref 3.77–5.28)
RH BLD: POSITIVE
SODIUM SERPL-SCNC: 138 MMOL/L (ref 136–145)
SPECIMEN VOL 24H UR: 3000 ML
T PALLIDUM IGG SER QL: NORMAL
T&S EXPIRATION DATE: NORMAL
URATE SERPL-MCNC: 4 MG/DL (ref 2.4–5.7)
WBC NRBC COR # BLD AUTO: 6.68 10*3/MM3 (ref 3.4–10.8)

## 2024-05-10 PROCEDURE — 59025 FETAL NON-STRESS TEST: CPT

## 2024-05-10 PROCEDURE — 86900 BLOOD TYPING SEROLOGIC ABO: CPT | Performed by: OBSTETRICS & GYNECOLOGY

## 2024-05-10 PROCEDURE — 86780 TREPONEMA PALLIDUM: CPT | Performed by: OBSTETRICS & GYNECOLOGY

## 2024-05-10 PROCEDURE — 76819 FETAL BIOPHYS PROFIL W/O NST: CPT

## 2024-05-10 PROCEDURE — 86901 BLOOD TYPING SEROLOGIC RH(D): CPT | Performed by: OBSTETRICS & GYNECOLOGY

## 2024-05-10 PROCEDURE — 76819 FETAL BIOPHYS PROFIL W/O NST: CPT | Performed by: OBSTETRICS & GYNECOLOGY

## 2024-05-10 PROCEDURE — 80053 COMPREHEN METABOLIC PANEL: CPT | Performed by: OBSTETRICS & GYNECOLOGY

## 2024-05-10 PROCEDURE — 85027 COMPLETE CBC AUTOMATED: CPT | Performed by: OBSTETRICS & GYNECOLOGY

## 2024-05-10 PROCEDURE — 81050 URINALYSIS VOLUME MEASURE: CPT | Performed by: OBSTETRICS & GYNECOLOGY

## 2024-05-10 PROCEDURE — 63710000001 ONDANSETRON ODT 4 MG TABLET DISPERSIBLE: Performed by: OBSTETRICS & GYNECOLOGY

## 2024-05-10 PROCEDURE — 84550 ASSAY OF BLOOD/URIC ACID: CPT | Performed by: OBSTETRICS & GYNECOLOGY

## 2024-05-10 PROCEDURE — 76805 OB US >/= 14 WKS SNGL FETUS: CPT

## 2024-05-10 PROCEDURE — 76805 OB US >/= 14 WKS SNGL FETUS: CPT | Performed by: OBSTETRICS & GYNECOLOGY

## 2024-05-10 PROCEDURE — 86850 RBC ANTIBODY SCREEN: CPT | Performed by: OBSTETRICS & GYNECOLOGY

## 2024-05-10 PROCEDURE — 82948 REAGENT STRIP/BLOOD GLUCOSE: CPT

## 2024-05-10 PROCEDURE — 63710000001 INSULIN GLARGINE PER 5 UNITS: Performed by: OBSTETRICS & GYNECOLOGY

## 2024-05-10 PROCEDURE — 84156 ASSAY OF PROTEIN URINE: CPT | Performed by: OBSTETRICS & GYNECOLOGY

## 2024-05-10 PROCEDURE — 99231 SBSQ HOSP IP/OBS SF/LOW 25: CPT | Performed by: OBSTETRICS & GYNECOLOGY

## 2024-05-10 PROCEDURE — 83615 LACTATE (LD) (LDH) ENZYME: CPT | Performed by: OBSTETRICS & GYNECOLOGY

## 2024-05-10 RX ADMIN — ACETAMINOPHEN 325MG 650 MG: 325 TABLET ORAL at 23:07

## 2024-05-10 RX ADMIN — ONDANSETRON 8 MG: 4 TABLET, ORALLY DISINTEGRATING ORAL at 23:07

## 2024-05-10 RX ADMIN — Medication 1 TABLET: at 08:54

## 2024-05-10 RX ADMIN — FERROUS SULFATE TAB 325 MG (65 MG ELEMENTAL FE) 325 MG: 325 (65 FE) TAB at 08:54

## 2024-05-10 RX ADMIN — ONDANSETRON 8 MG: 4 TABLET, ORALLY DISINTEGRATING ORAL at 10:23

## 2024-05-10 RX ADMIN — DOCUSATE SODIUM 100 MG: 100 CAPSULE, LIQUID FILLED ORAL at 09:35

## 2024-05-10 RX ADMIN — INSULIN GLARGINE 20 UNITS: 100 INJECTION, SOLUTION SUBCUTANEOUS at 21:55

## 2024-05-10 RX ADMIN — INSULIN GLARGINE 16 UNITS: 100 INJECTION, SOLUTION SUBCUTANEOUS at 08:54

## 2024-05-10 RX ADMIN — ACETAMINOPHEN 325MG 650 MG: 325 TABLET ORAL at 08:54

## 2024-05-10 RX ADMIN — DOCUSATE SODIUM 100 MG: 100 CAPSULE, LIQUID FILLED ORAL at 21:55

## 2024-05-10 RX ADMIN — Medication 10 ML: at 21:55

## 2024-05-10 NOTE — PLAN OF CARE
Goal Outcome Evaluation:  Plan of Care Reviewed With: patient           Outcome Evaluation: Pt admitted for observation for concerns of pre-eclampsia. 24hr urine started on 5/9 at 1745. Pt compliant with plan and collecting urine per protocol. NST reactive. VSS with normotensive BP's. Pt had c/o headache early in shift that was relieved by tylenol. Blood sugars stable with fasting this morning of 84. Pt aware to let nurse know when she is eating breakfast. Pt aware of plan of care for the day. Denies needs/concerns.

## 2024-05-10 NOTE — NON STRESS TEST
Abdon Andres, samantha  at 35w1d with an VANE of 2024, by Last Menstrual Period, was seen at [facility] for a nonstress test.    Chief Complaint   Patient presents with    Contractions     35.0 cramping       Patient Active Problem List   Diagnosis    Pre-eclampsia in third trimester, without severe features    Insulin controlled gestational diabetes mellitus (GDM) in third trimester       Start Time:   Stop Time: 43    Interpretation A  Nonstress Test Interpretation A: Reactive  Interpretation B  Nonstress Test Interpretation B: Reactive

## 2024-05-10 NOTE — PROGRESS NOTES
DAILY PROGRESS NOTE    Patient Identification:  Name: Abdon Andres  Age: 23 y.o.  Sex: female  :  2000  MRN: [id number]               Subjective:  Interval History: 23-year-old  1 para 0 at 35-1/7 weeks admitted with preeclampsia.  The patient does not have a headache any longer.  Denies upper abdominal pain.  Her baby is moving actively.    Objective:    Scheduled Meds:docusate sodium, 100 mg, Oral, BID  ferrous sulfate, 325 mg, Oral, Daily With Breakfast  insulin glargine, 16 Units, Subcutaneous, QAM  insulin glargine, 20 Units, Subcutaneous, Nightly  polyethylene glycol, 17 g, Oral, Daily  prenatal vitamin, 1 tablet, Oral, Daily  sodium chloride, 10 mL, Intravenous, Q12H      Continuous Infusions:   PRN Meds:  acetaminophen    bisacodyl    calcium carbonate    hydrOXYzine    lidocaine    ondansetron ODT **OR** ondansetron    sodium chloride    sodium chloride    Vital signs in last 24 hours:  Temp:  [97.4 °F (36.3 °C)-98.9 °F (37.2 °C)] 97.7 °F (36.5 °C)  Heart Rate:  [67-84] 72  Resp:  [15-18] 16  BP: (114-148)/(60-81) 143/73    Intake/Output:    Intake/Output Summary (Last 24 hours) at 5/10/2024 1340  Last data filed at 5/10/2024 0615  Gross per 24 hour   Intake --   Output 1100 ml   Net -1100 ml       Exam:  General Appearance:    Alert, cooperative, no distress, appears stated age   Lungs:     Clear to auscultation bilaterally, respirations unlabored    Heart:    Regular rate and rhythm, S1 and S2 normal, no murmur, rub   or gallop   Abdomen:   Soft, gravid.  There is no epigastric tenderness.  No fundal tenderness.  No right upper quadrant tenderness.  No suprapubic tenderness.   Extremities: Equal in size   Skin:   Skin color, texture, turgor normal, no rashes or lesions        Data Review:    Lab Results (last 24 hours)       Procedure Component Value Units Date/Time    Urine Culture - Urine, Urine, Clean Catch [[id number]] Collected: 24 1525    Specimen: Urine, Clean Catch  Updated: 05/10/24 1027     Urine Culture 50,000 CFU/mL Mixed Janell Isolated    Narrative:      Specimen contains mixed organisms of questionable pathogenicity suggestive of contamination. If symptoms persist, suggest recollection.  Colonization of the urinary tract without infection is common. Treatment is discouraged unless the patient is symptomatic, pregnant, or undergoing an invasive urologic procedure.    POC Glucose Once [[id number]]  (Normal) Collected: 05/10/24 1019    Specimen: Blood Updated: 05/10/24 1020     Glucose 99 mg/dL     T Pallidum Antibody w/ reflex RPR (Syphilis) [[id number]]  (Normal) Collected: 05/10/24 0832    Specimen: Blood Updated: 05/10/24 1007     Treponemal AB Total Non-Reactive    Lactate Dehydrogenase [[id number]]  (Normal) Collected: 05/10/24 0832    Specimen: Blood Updated: 05/10/24 0943      U/L     Uric Acid [[id number]]  (Normal) Collected: 05/10/24 0832    Specimen: Blood Updated: 05/10/24 0943     Uric Acid 4.0 mg/dL     Comprehensive Metabolic Panel [[id number]]  (Abnormal) Collected: 05/10/24 0832    Specimen: Blood Updated: 05/10/24 0914     Glucose 76 mg/dL      BUN 5 mg/dL      Creatinine 0.46 mg/dL      Sodium 138 mmol/L      Potassium 4.0 mmol/L      Chloride 106 mmol/L      CO2 22.0 mmol/L      Calcium 9.0 mg/dL      Total Protein 6.7 g/dL      Albumin 3.2 g/dL      ALT (SGPT) 40 U/L      AST (SGOT) 25 U/L      Alkaline Phosphatase 183 U/L      Total Bilirubin 0.3 mg/dL      Globulin 3.5 gm/dL      A/G Ratio 0.9 g/dL      BUN/Creatinine Ratio 10.9     Anion Gap 10.0 mmol/L      eGFR 139.8 mL/min/1.73     Narrative:      GFR Normal >60  Chronic Kidney Disease <60  Kidney Failure <15      CBC (No Diff) [[id number]]  (Abnormal) Collected: 05/10/24 0832    Specimen: Blood Updated: 05/10/24 0855     WBC 6.68 10*3/mm3      RBC 4.29 10*6/mm3      Hemoglobin 9.2 g/dL      Hematocrit 29.4 %      MCV 68.5 fL      MCH 21.4 pg      MCHC 31.3 g/dL      RDW 15.3 %      RDW-SD 37.1  fl      MPV 9.7 fL      Platelets 273 10*3/mm3     POC Glucose Once [[id number]]  (Normal) Collected: 05/10/24 0615    Specimen: Blood Updated: 05/10/24 0621     Glucose 84 mg/dL     POC Glucose Once [[id number]]  (Abnormal) Collected: 05/09/24 2220    Specimen: Blood Updated: 05/09/24 2221     Glucose 140 mg/dL     Protein / Creatinine Ratio, Urine - Urine, Clean Catch [[id number]]  (Abnormal) Collected: 05/09/24 1525    Specimen: Urine, Clean Catch Updated: 05/09/24 1648     Protein/Creatinine Ratio, Urine 1,425.7 mg/G Crea      Creatinine, Urine 68.6 mg/dL      Total Protein, Urine 97.8 mg/dL     Urinalysis, Microscopic Only - Urine, Clean Catch [[id number]]  (Abnormal) Collected: 05/09/24 1525    Specimen: Urine, Clean Catch Updated: 05/09/24 1617     RBC, UA 0-2 /HPF      WBC, UA 0-2 /HPF      Bacteria, UA 1+ /HPF      Squamous Epithelial Cells, UA 7-12 /HPF      Hyaline Casts, UA 3-6 /LPF      Methodology Manual Light Microscopy    Comprehensive Metabolic Panel [[id number]]  (Abnormal) Collected: 05/09/24 1525    Specimen: Blood Updated: 05/09/24 1612     Glucose 77 mg/dL      BUN 6 mg/dL      Creatinine 0.66 mg/dL      Sodium 139 mmol/L      Potassium 3.8 mmol/L      Chloride 107 mmol/L      CO2 21.0 mmol/L      Calcium 9.1 mg/dL      Total Protein 6.8 g/dL      Albumin 3.5 g/dL      ALT (SGPT) 44 U/L      AST (SGOT) 25 U/L      Alkaline Phosphatase 186 U/L      Total Bilirubin 0.3 mg/dL      Globulin 3.3 gm/dL      A/G Ratio 1.1 g/dL      BUN/Creatinine Ratio 9.1     Anion Gap 11.0 mmol/L      eGFR 128.2 mL/min/1.73     Narrative:      GFR Normal >60  Chronic Kidney Disease <60  Kidney Failure <15      Fetal Fibronectin - Vaginal Fluid, Cervix [[id number]] Collected: 05/09/24 1529    Specimen: Vaginal Fluid from Cervix Updated: 05/09/24 1607     Fetal Fibronectin Negative     Bloody Specimen? No    Urinalysis With Culture If Indicated - Urine, Clean Catch [[id number]]  (Abnormal) Collected: 05/09/24 7710     Specimen: Urine, Clean Catch Updated: 05/09/24 1551     Color, UA Yellow     Appearance, UA Cloudy     pH, UA 7.5     Specific Gravity, UA 1.013     Glucose, UA Negative     Ketones, UA Trace     Bilirubin, UA Negative     Blood, UA Negative     Protein,  mg/dL (2+)     Leuk Esterase, UA Negative     Nitrite, UA Negative     Urobilinogen, UA 1.0 E.U./dL    Narrative:      In absence of clinical symptoms, the presence of pyuria, bacteria, and/or nitrites on the urinalysis result does not correlate with infection.    CBC & Differential [[id number]]  (Abnormal) Collected: 05/09/24 1525    Specimen: Blood Updated: 05/09/24 1544    Narrative:      The following orders were created for panel order CBC & Differential.  Procedure                               Abnormality         Status                     ---------                               -----------         ------                     CBC Auto Differential[[id number]]        Abnormal            Final result                 Please view results for these tests on the individual orders.    CBC Auto Differential [[id number]]  (Abnormal) Collected: 05/09/24 1525    Specimen: Blood Updated: 05/09/24 1544     WBC 8.80 10*3/mm3      RBC 4.24 10*6/mm3      Hemoglobin 9.1 g/dL      Hematocrit 29.1 %      MCV 68.6 fL      MCH 21.5 pg      MCHC 31.3 g/dL      RDW 15.3 %      RDW-SD 37.4 fl      MPV 10.2 fL      Platelets 283 10*3/mm3      Neutrophil % 80.7 %      Lymphocyte % 9.8 %      Monocyte % 7.6 %      Eosinophil % 0.9 %      Basophil % 0.3 %      Immature Grans % 0.7 %      Neutrophils, Absolute 7.10 10*3/mm3      Lymphocytes, Absolute 0.86 10*3/mm3      Monocytes, Absolute 0.67 10*3/mm3      Eosinophils, Absolute 0.08 10*3/mm3      Basophils, Absolute 0.03 10*3/mm3      Immature Grans, Absolute 0.06 10*3/mm3           Assessment:    Pre-eclampsia in third trimester, without severe features    Insulin controlled gestational diabetes mellitus (GDM) in third  trimester        Plan:  1.  Intrauterine pregnancy at 35-1/7 weeks  2.  Preeclampsia.  Blood pressures remain normal and the patient is asymptomatic.  PC ratio was elevated.  A 24-hour urine is in progress.  AST was elevated yesterday.  It remains elevated, but has decreased towards normal today.  Maternal-fetal medicine has also been consulted for further recommendations.  3.  Gestational diabetes requiring insulin.  Sugar control has been adequate in the hospital.    Nelson Moeller MD  5/10/2024

## 2024-05-10 NOTE — PLAN OF CARE
Goal Outcome Evaluation:           Progress: no change  Outcome Evaluation: Pt admitted for Pre-E, 24 hr urine in progress. Pt BP WNL at this time. Pt denies VB/CTX/LOF and pre-e sx at this time. +FM verbalized by pt. BG WNL. Denies needs or concerns at this time.

## 2024-05-10 NOTE — NON STRESS TEST
Abdon Andres, samantha  at 35w1d with an VANE of 2024, by Last Menstrual Period, was seen at [address] for a nonstress test.    Chief Complaint   Patient presents with    Contractions     35.0 cramping       Patient Active Problem List   Diagnosis    Pre-eclampsia in third trimester, without severe features    Insulin controlled gestational diabetes mellitus (GDM) in third trimester       Start Time: 901  Stop Time: 937    Interpretation A  Nonstress Test Interpretation A: Reactive  Interpretation B  Nonstress Test Interpretation B: Reactive

## 2024-05-10 NOTE — PROGRESS NOTES
MATERNAL FETAL MEDICINE Consult Note    Dear Dr Nelson Moeller MD:    Thank you for your kind referral of Abdon Andres.  As you know, she is a 23 y.o.   at  35 1/7 weeks gestation (Estimated Date of Delivery: 24). This is a consult.      Her antepartum course is complicated by:  PreE currently without SF  GDMA2    Aneuploidy Screening: low risk    HPI: Today, she denies headache, blurry vision, RUQ pain. No vaginal bleeding, no contractions.     Review of History:  Past Medical History:   Diagnosis Date    Anxiety     Chlamydia     Gestational diabetes     insulin dependent     Past Surgical History:   Procedure Laterality Date    SPINE SURGERY         Social History     Socioeconomic History    Marital status: Single   Tobacco Use    Smoking status: Never    Smokeless tobacco: Never   Vaping Use    Vaping status: Never Used   Substance and Sexual Activity    Alcohol use: Not Currently    Drug use: Never    Sexual activity: Yes     No family history on file.   Allergies   Allergen Reactions    Other Rash     UTI medication (prescribed by hospital)    UTI medication (prescribed by hospital)   UTI medication (prescribed by hospital)      No current facility-administered medications on file prior to encounter.     Current Outpatient Medications on File Prior to Encounter   Medication Sig Dispense Refill    Accu-Chek Softclix Lancets lancets USE 1 LANCET TO GET BLOOD SAMPLE FOR BLOOD GLUCOSE TESTING FOUR TIMES DAILY 100 each 1    Blood Glucose Monitoring Suppl (Accu-Chek Guide) w/Device kit USE 1 KIT TO CHECK BLOOD GLUCOSE      docusate sodium (COLACE) 100 MG capsule Take 1 capsule by mouth 2 (Two) Times a Day.      ferrous sulfate 325 (65 FE) MG tablet Take 1 tablet by mouth Daily With Breakfast. 30 tablet 2    fluticasone (CUTIVATE) 0.005 % ointment APPLY TO AFFECTED AREAS OF THE BODY TWICE DAILY      glucose blood test strip Take blood sugar fasting (AM) and 2 hours after start of breakfast, lunch  "and dinner 100 each 12    hydrocortisone 2.5 % ointment APPLY TO AFFECTED AREA OF FACE TWICE DAILY      Insulin Glargine (LANTUS SOLOSTAR) 100 UNIT/ML injection pen Inject 16 units under the skin each AM and 20 units under the skin each evening 15 mL 3    Insulin Pen Needle (BD Pen Needle Melanie 2nd Gen) 32G X 4 MM misc Use 1 each 2 (Two) Times a Day. 120 each 1    prenatal vitamin (prenatal, CLASSIC, vitamin) tablet Take  by mouth Daily.          Past obstetric, gynecological, medical, surgical, family and social history reviewed.  Relevant lab work and imaging reviewed.    Review of systems  Constitutional:  denies fever, chills, malaise.   ENT/Mouth:  denies sore throat, tinnitus  Eyes: denies vision changes/pain  CV:  denies chest pain  Respiratory:  denies cough/SOB  GI:  denies N/V, diarrhea, abdominal pain.    :   denies dysuria  Skin:  denies lesions or pruritus   Neuro:  denies weakness, focal neurologic symptoms    Vitals:    24 1726 05/10/24 0029 05/10/24 0615 05/10/24 0859   BP: 148/77 122/64 120/75    BP Location:   Left arm    Patient Position:   Sitting    Pulse: 74 69 67    Resp:  18 16    Temp:  97.4 °F (36.3 °C) 97.6 °F (36.4 °C) 97.7 °F (36.5 °C)   TempSrc:  Oral Oral Oral   Weight:       Height: 172.7 cm (68\")          PHYSICAL EXAM   GENERAL: Not in acute distress, AAOx3, pleasant  CARDIO: regular rate and rhythm  PULM: symmetric chest rise, speaking in complete sentences without difficulty  NEURO: awake, alert and oriented to person, place, and time  ABDOMINAL: No fundal tenderness, no rebound or guarding, gravid  EXTREMITIES: no bilateral lower extremity edema/tenderness  SKIN: Warm, well-perfused      ULTRASOUND   Please view full ultrasound note on Imaging tab in View[employer].  Cephalic presentation.  Fundal placenta.  AR 15 cm, which is normal.   EFW 2659 g (50%, AC 53%)  BPP 8/8  Limited anatomy appears normal.     ASSESSMENT/COUNSELIN y.o.   at  35 1/7 weeks gestation " (Estimated Date of Delivery: 6/13/24).     -Pregnancy  [ X ] stable  [   ] improving [  ] worsening    There are no diagnoses linked to this encounter.     Pre-eclampsia without severe features  I spoke to this patient and her  at about pre-eclampsia and the implications of her diagnosis.     Patient has a P/C ratio that is >1000, significant proteinuria.   She currently is without symptoms.  I counseled her on pre-eclampsia and that it is a blood pressure issue in pregnancy usually characterized by proteinuria that can affect every organ system and comes from the placenta.  We discussed that it is ALWAYS safest for mom to deliver with a diagnosis of pre-eclampsia but that we often recommend expectant management if mom is stable to optimize both maternal and fetal outcome depending on gestational age.  We discussed that reasons for delivery would be worsening lab values that indicate severe pre-eclampsia (thrombocytopenia <100, twice normal LFT's, Cr >1.1), pulmonary edema, certainly eclampsia, severe BP unable to be controled with IV medications, or HA unresponsive to medications.  I told her that since pre-eclampsia is a placental issue, often the baby tells us when it is time for delivery with signs of distress on fetal heart rate changes.       Right now, I agree with NO BMZ given gestational age >34 weeks with GDM.       I recommended inpatient monitoring until delivery no later that 37 weeks, although I thinks he will declare herself severe and need delivery sooner than that (high risk over the next week).  We can re-evaluate early part of next week if she really wants to go home, but with her mildly increased liver enzymes and intermittent HA, I am hesitant to do this.       GDMA2:  Managed by OB.  Reasonable glucoses on Lantus 16 u BID.  Would continue.          Plan  -BID NST as long as fetal status reassuring  -Low threshold for magnesium if progressing to severe pre-eclampsia and then would proceed  with delivery.  -Recommend daily [facility] labs (LDH, Uric acid, CMP, CBC daily for now).  If liver enzymes twice normal, would proceed with delivery  -Would recommend inpatient management for now--ideally until delivery   -Right now delivery indicated at 37 weeks--I suspect she will declare herself sooner.      Will continue to follow along.  Please call with any questions.       I spent 30 minutes caring for this patient on this date of service. This time includes time spent by me in the following activities: preparing for the visit, reviewing tests, obtaining and/or reviewing a separately obtained history, performing a medically appropriate examination and/or evaluation, counseling and educating the patient/family/caregiver and independently interpreting results and communicating that information with the patient/family/caregiver with greater than 50% spent in counseling and coordination of care.       I spent 4 minutes on the separately reported service of US imaging not included in the time used to support the E/M service also reported today.      Delia Ortez MD [facility]  Maternal Fetal Medicine-[facility]  Office: [phone]  [email or web address]

## 2024-05-11 LAB
ALBUMIN SERPL-MCNC: 3.1 G/DL (ref 3.5–5.2)
ALBUMIN/GLOB SERPL: 1 G/DL
ALP SERPL-CCNC: 172 U/L (ref 39–117)
ALT SERPL W P-5'-P-CCNC: 34 U/L (ref 1–33)
ANION GAP SERPL CALCULATED.3IONS-SCNC: 13 MMOL/L (ref 5–15)
ANISOCYTOSIS BLD QL: ABNORMAL
AST SERPL-CCNC: 20 U/L (ref 1–32)
BASOPHILS # BLD AUTO: 0.02 10*3/MM3 (ref 0–0.2)
BASOPHILS # BLD MANUAL: 0 10*3/MM3 (ref 0–0.2)
BASOPHILS NFR BLD AUTO: 0.3 % (ref 0–1.5)
BASOPHILS NFR BLD MANUAL: 0 % (ref 0–1.5)
BILIRUB SERPL-MCNC: 0.2 MG/DL (ref 0–1.2)
BUN SERPL-MCNC: 8 MG/DL (ref 6–20)
BUN/CREAT SERPL: 14 (ref 7–25)
CALCIUM SPEC-SCNC: 8.6 MG/DL (ref 8.6–10.5)
CHLORIDE SERPL-SCNC: 108 MMOL/L (ref 98–107)
CO2 SERPL-SCNC: 18 MMOL/L (ref 22–29)
CREAT SERPL-MCNC: 0.57 MG/DL (ref 0.57–1)
DEPRECATED RDW RBC AUTO: 39.1 FL (ref 37–54)
EGFRCR SERPLBLD CKD-EPI 2021: 132.8 ML/MIN/1.73
EOSINOPHIL # BLD AUTO: 0.09 10*3/MM3 (ref 0–0.4)
EOSINOPHIL # BLD MANUAL: 0 10*3/MM3 (ref 0–0.4)
EOSINOPHIL NFR BLD AUTO: 1.3 % (ref 0.3–6.2)
EOSINOPHIL NFR BLD MANUAL: 0 % (ref 0.3–6.2)
ERYTHROCYTE [DISTWIDTH] IN BLOOD BY AUTOMATED COUNT: 15.8 % (ref 12.3–15.4)
FERRITIN SERPL-MCNC: 12.6 NG/ML (ref 13–150)
GLOBULIN UR ELPH-MCNC: 3.1 GM/DL
GLUCOSE BLDC GLUCOMTR-MCNC: 113 MG/DL (ref 70–130)
GLUCOSE BLDC GLUCOMTR-MCNC: 120 MG/DL (ref 70–130)
GLUCOSE BLDC GLUCOMTR-MCNC: 154 MG/DL (ref 70–130)
GLUCOSE BLDC GLUCOMTR-MCNC: 77 MG/DL (ref 70–130)
GLUCOSE BLDC GLUCOMTR-MCNC: 84 MG/DL (ref 70–130)
GLUCOSE BLDC GLUCOMTR-MCNC: 98 MG/DL (ref 70–130)
GLUCOSE SERPL-MCNC: 118 MG/DL (ref 65–99)
HCT VFR BLD AUTO: 27.7 % (ref 34–46.6)
HGB BLD-MCNC: 8.7 G/DL (ref 12–15.9)
IMM GRANULOCYTES # BLD AUTO: 0.08 10*3/MM3 (ref 0–0.05)
IMM GRANULOCYTES NFR BLD AUTO: 1.2 % (ref 0–0.5)
IRON 24H UR-MRATE: 20 MCG/DL (ref 37–145)
IRON SATN MFR SERPL: 3 % (ref 20–50)
LDH SERPL-CCNC: 184 U/L (ref 135–214)
LYMPHOCYTES # BLD AUTO: 0.93 10*3/MM3 (ref 0.7–3.1)
LYMPHOCYTES # BLD MANUAL: 0.38 10*3/MM3 (ref 0.7–3.1)
LYMPHOCYTES NFR BLD AUTO: 13.4 % (ref 19.6–45.3)
LYMPHOCYTES NFR BLD MANUAL: 0 % (ref 5–12)
MCH RBC QN AUTO: 21.7 PG (ref 26.6–33)
MCHC RBC AUTO-ENTMCNC: 31.4 G/DL (ref 31.5–35.7)
MCV RBC AUTO: 69.1 FL (ref 79–97)
MICROCYTES BLD QL: ABNORMAL
MONOCYTES # BLD AUTO: 0.52 10*3/MM3 (ref 0.1–0.9)
MONOCYTES # BLD: 0 10*3/MM3 (ref 0.1–0.9)
MONOCYTES NFR BLD AUTO: 7.5 % (ref 5–12)
NEUTROPHILS # BLD AUTO: 6.56 10*3/MM3 (ref 1.7–7)
NEUTROPHILS NFR BLD AUTO: 5.3 10*3/MM3 (ref 1.7–7)
NEUTROPHILS NFR BLD AUTO: 76.3 % (ref 42.7–76)
NEUTROPHILS NFR BLD MANUAL: 94.5 % (ref 42.7–76)
PLAT MORPH BLD: NORMAL
PLATELET # BLD AUTO: 274 10*3/MM3 (ref 140–450)
PMV BLD AUTO: 10.7 FL (ref 6–12)
POIKILOCYTOSIS BLD QL SMEAR: ABNORMAL
POTASSIUM SERPL-SCNC: 3.5 MMOL/L (ref 3.5–5.2)
PROT SERPL-MCNC: 6.2 G/DL (ref 6–8.5)
RBC # BLD AUTO: 4.01 10*6/MM3 (ref 3.77–5.28)
SMUDGE CELLS BLD QL SMEAR: ABNORMAL
SODIUM SERPL-SCNC: 139 MMOL/L (ref 136–145)
TIBC SERPL-MCNC: 685 MCG/DL (ref 298–536)
TRANSFERRIN SERPL-MCNC: 460 MG/DL (ref 200–360)
URATE SERPL-MCNC: 4.8 MG/DL (ref 2.4–5.7)
VARIANT LYMPHS NFR BLD MANUAL: 5.5 % (ref 19.6–45.3)
WBC NRBC COR # BLD AUTO: 6.94 10*3/MM3 (ref 3.4–10.8)

## 2024-05-11 PROCEDURE — 80053 COMPREHEN METABOLIC PANEL: CPT | Performed by: OBSTETRICS & GYNECOLOGY

## 2024-05-11 PROCEDURE — 83615 LACTATE (LD) (LDH) ENZYME: CPT | Performed by: OBSTETRICS & GYNECOLOGY

## 2024-05-11 PROCEDURE — 84466 ASSAY OF TRANSFERRIN: CPT | Performed by: STUDENT IN AN ORGANIZED HEALTH CARE EDUCATION/TRAINING PROGRAM

## 2024-05-11 PROCEDURE — 82728 ASSAY OF FERRITIN: CPT | Performed by: STUDENT IN AN ORGANIZED HEALTH CARE EDUCATION/TRAINING PROGRAM

## 2024-05-11 PROCEDURE — 84550 ASSAY OF BLOOD/URIC ACID: CPT | Performed by: OBSTETRICS & GYNECOLOGY

## 2024-05-11 PROCEDURE — 25810000003 SODIUM CHLORIDE 0.9 % SOLUTION 250 ML FLEX CONT: Performed by: STUDENT IN AN ORGANIZED HEALTH CARE EDUCATION/TRAINING PROGRAM

## 2024-05-11 PROCEDURE — 25010000002 IRON SUCROSE PER 1 MG: Performed by: STUDENT IN AN ORGANIZED HEALTH CARE EDUCATION/TRAINING PROGRAM

## 2024-05-11 PROCEDURE — 83540 ASSAY OF IRON: CPT | Performed by: STUDENT IN AN ORGANIZED HEALTH CARE EDUCATION/TRAINING PROGRAM

## 2024-05-11 PROCEDURE — 59025 FETAL NON-STRESS TEST: CPT

## 2024-05-11 PROCEDURE — 59025 FETAL NON-STRESS TEST: CPT | Performed by: STUDENT IN AN ORGANIZED HEALTH CARE EDUCATION/TRAINING PROGRAM

## 2024-05-11 PROCEDURE — 63710000001 DIPHENHYDRAMINE PER 50 MG: Performed by: STUDENT IN AN ORGANIZED HEALTH CARE EDUCATION/TRAINING PROGRAM

## 2024-05-11 PROCEDURE — 82948 REAGENT STRIP/BLOOD GLUCOSE: CPT

## 2024-05-11 PROCEDURE — 63710000001 INSULIN GLARGINE PER 5 UNITS: Performed by: OBSTETRICS & GYNECOLOGY

## 2024-05-11 PROCEDURE — 85007 BL SMEAR W/DIFF WBC COUNT: CPT | Performed by: OBSTETRICS & GYNECOLOGY

## 2024-05-11 PROCEDURE — 99232 SBSQ HOSP IP/OBS MODERATE 35: CPT | Performed by: STUDENT IN AN ORGANIZED HEALTH CARE EDUCATION/TRAINING PROGRAM

## 2024-05-11 PROCEDURE — 85025 COMPLETE CBC W/AUTO DIFF WBC: CPT | Performed by: OBSTETRICS & GYNECOLOGY

## 2024-05-11 RX ORDER — DIPHENHYDRAMINE HCL 25 MG
25 CAPSULE ORAL ONCE
Status: COMPLETED | OUTPATIENT
Start: 2024-05-11 | End: 2024-05-11

## 2024-05-11 RX ORDER — ACETAMINOPHEN 325 MG/1
650 TABLET ORAL ONCE
Status: COMPLETED | OUTPATIENT
Start: 2024-05-11 | End: 2024-05-11

## 2024-05-11 RX ADMIN — HYDROXYZINE HYDROCHLORIDE 50 MG: 50 TABLET ORAL at 22:32

## 2024-05-11 RX ADMIN — Medication 10 ML: at 09:51

## 2024-05-11 RX ADMIN — Medication 1 TABLET: at 09:51

## 2024-05-11 RX ADMIN — DOCUSATE SODIUM 100 MG: 100 CAPSULE, LIQUID FILLED ORAL at 21:25

## 2024-05-11 RX ADMIN — INSULIN GLARGINE 20 UNITS: 100 INJECTION, SOLUTION SUBCUTANEOUS at 21:26

## 2024-05-11 RX ADMIN — INSULIN GLARGINE 16 UNITS: 100 INJECTION, SOLUTION SUBCUTANEOUS at 07:03

## 2024-05-11 RX ADMIN — ACETAMINOPHEN 650 MG: 325 TABLET, FILM COATED ORAL at 17:29

## 2024-05-11 RX ADMIN — DIPHENHYDRAMINE HYDROCHLORIDE 25 MG: 25 CAPSULE ORAL at 17:30

## 2024-05-11 RX ADMIN — ACETAMINOPHEN 325MG 650 MG: 325 TABLET ORAL at 10:51

## 2024-05-11 RX ADMIN — FERROUS SULFATE TAB 325 MG (65 MG ELEMENTAL FE) 325 MG: 325 (65 FE) TAB at 09:51

## 2024-05-11 RX ADMIN — ACETAMINOPHEN 325MG 650 MG: 325 TABLET ORAL at 21:34

## 2024-05-11 RX ADMIN — DOCUSATE SODIUM 100 MG: 100 CAPSULE, LIQUID FILLED ORAL at 09:51

## 2024-05-11 RX ADMIN — Medication 10 ML: at 20:17

## 2024-05-11 RX ADMIN — IRON SUCROSE 300 MG: 20 INJECTION, SOLUTION INTRAVENOUS at 18:16

## 2024-05-11 NOTE — NON STRESS TEST
Abdon Andres, a  at 35w1d with an VANE of 2024, by Last Menstrual Period, was seen at [address] for a nonstress test.    Chief Complaint   Patient presents with    Contractions     35.0 cramping       Patient Active Problem List   Diagnosis    Pre-eclampsia in third trimester, without severe features    Insulin controlled gestational diabetes mellitus (GDM) in third trimester       Start Time:   Stop Time:     Interpretation A  Nonstress Test Interpretation A: Reactive

## 2024-05-11 NOTE — PLAN OF CARE
Goal Outcome Evaluation:  Plan of Care Reviewed With: patient        Progress: no change  Outcome Evaluation: VSS, RNST, +FM and completed FKC this shift. 24 hour urine result 246. Pt denies VB, contractions, LOF. Pt reported a mild HA last night that did not initially resolve with Tylenol but was resolved this morning upon awakening. Denies visual changes, and RUQ/epigastric pain. No other needs or complaints identified throughout the night.

## 2024-05-11 NOTE — PROGRESS NOTES
Patient name: Abdon Andres  Age: 23 y.o.  Sex: female  YOB: 2000   MRN: [id number]  Admission Date: 2024    Gestational age: 35w2d    Chief Complaint   Patient presents with    Contractions     35.0 cramping      Subjective:    Abdon Andres is a 23 y.o.  at 35w2d who is admitted for preeclampsia without severe features. Her pregnancy is also complicated by GDMA2 on Lantus.    She reports doing okay. She has a frontal headache that she has taken tylenol with some relief and is currently trying to rest. Denies vision changes, chest pain, shortness of breath, RUQ pain. She also denies contractions, leakage of fluid, vaginal bleeding. She reports active fetal movement.    Objective:   Temp:  [97.7 °F (36.5 °C)-97.8 °F (36.6 °C)] 97.8 °F (36.6 °C)  Heart Rate:  [77-89] 77  Resp:  [16-18] 18  BP: (112-135)/(39-80) 113/52    Exam:     Physical Examination: General appearance - alert, well appearing, and in no distress  Mental status - alert, oriented to person, place, and time  Eyes - sclera anicteric, left eye normal, right eye normal  Neck - normal ROM  Chest - no increased work of breathing, regular respirations   Abdomen - soft, nontender, gravid   Neurological - alert, oriented, normal speech, no focal findings or movement disorder noted  Musculoskeletal - no joint tenderness, deformity or swelling  Extremities - no pedal edema noted  Skin - normal coloration and turgor, no rashes, no suspicious skin lesions noted    Labs:  Lab Results   Component Value Date    WBC 6.94 2024    HGB 8.7 (L) 2024    HCT 27.7 (L) 2024    MCV 69.1 (L) 2024     2024     Lab Results   Component Value Date    GLUCOSE 118 (H) 2024    BUN 8 2024    CREATININE 0.57 2024    EGFRRESULT 132 2024    EGFR 132.8 2024    BCR 14.0 2024    K 3.5 2024    CO2 18.0 (L) 2024    CALCIUM 8.6 2024    PROTENTOTREF 6.9 2024     ALBUMIN 3.1 (L) 05/11/2024    BILITOT 0.2 05/11/2024    AST 20 05/11/2024    ALT 34 (H) 05/11/2024     Uric acid 4.8 (normal)      (normal)     NST: 150s baseline, moderate variability, accels +, decels -. Reactive NST  Pleak: quiet     Glucose 78, 118, 98, 77, 113    Assessment/Plan:      IUP at 35w2d   - Continue fetal monitoring with NST BID.    Preeclampsia without severe features  - Reviewed blood pressures that has remained normotensive with isolated mild range yesterday.  - P/C ratio 1425.7 with 24 h urine protein returning as 246.   - She has had intermittent headache and currently has one that we have treated with Tylenol.  - Will continue to monitor blood pressures and preeclampsia symptoms. If her headache does not improve, will plan to rule in for preeclampsia with severe features and proceed with delivery.   -Reviewed her preeclampsia labs that are showing improvement in ALT that has almost normalized. Will continue daily preeclampsia labs.   - Plan to deliver if she develops severe features and continue inpatient management at this time and consider possible outpatient management on Monday if symptoms and labs improve.     GDMA2   - Reviewed glucose that has been well controlled with Lantus 16/20 U BID.     Iron deficiency anemia  - Ordered Venofer infusion today for anemia - Hgb 8.7 with ferritin 12.6.     Dispo: Continue inpatient management of preeclampsia without severe features.     Shanice Sherwood MD  5/11/2024  11:13 EDT

## 2024-05-12 LAB
ABO GROUP BLD: NORMAL
ALBUMIN SERPL-MCNC: 2.9 G/DL (ref 3.5–5.2)
ALBUMIN/GLOB SERPL: 1 G/DL
ALP SERPL-CCNC: 158 U/L (ref 39–117)
ALT SERPL W P-5'-P-CCNC: 31 U/L (ref 1–33)
ANION GAP SERPL CALCULATED.3IONS-SCNC: 10 MMOL/L (ref 5–15)
AST SERPL-CCNC: 17 U/L (ref 1–32)
BASOPHILS # BLD AUTO: 0.03 10*3/MM3 (ref 0–0.2)
BASOPHILS # BLD AUTO: 0.03 10*3/MM3 (ref 0–0.2)
BASOPHILS NFR BLD AUTO: 0.3 % (ref 0–1.5)
BASOPHILS NFR BLD AUTO: 0.3 % (ref 0–1.5)
BILIRUB SERPL-MCNC: 0.2 MG/DL (ref 0–1.2)
BLD GP AB SCN SERPL QL: NEGATIVE
BUN SERPL-MCNC: 7 MG/DL (ref 6–20)
BUN/CREAT SERPL: 14.6 (ref 7–25)
CALCIUM SPEC-SCNC: 8.7 MG/DL (ref 8.6–10.5)
CHLORIDE SERPL-SCNC: 108 MMOL/L (ref 98–107)
CO2 SERPL-SCNC: 19 MMOL/L (ref 22–29)
CREAT SERPL-MCNC: 0.48 MG/DL (ref 0.57–1)
DEPRECATED RDW RBC AUTO: 39 FL (ref 37–54)
DEPRECATED RDW RBC AUTO: 39.4 FL (ref 37–54)
EGFRCR SERPLBLD CKD-EPI 2021: 138.4 ML/MIN/1.73
EOSINOPHIL # BLD AUTO: 0.11 10*3/MM3 (ref 0–0.4)
EOSINOPHIL # BLD AUTO: 0.18 10*3/MM3 (ref 0–0.4)
EOSINOPHIL NFR BLD AUTO: 1.1 % (ref 0.3–6.2)
EOSINOPHIL NFR BLD AUTO: 1.9 % (ref 0.3–6.2)
ERYTHROCYTE [DISTWIDTH] IN BLOOD BY AUTOMATED COUNT: 15.9 % (ref 12.3–15.4)
ERYTHROCYTE [DISTWIDTH] IN BLOOD BY AUTOMATED COUNT: 16 % (ref 12.3–15.4)
GLOBULIN UR ELPH-MCNC: 3 GM/DL
GLUCOSE BLDC GLUCOMTR-MCNC: 104 MG/DL (ref 70–130)
GLUCOSE BLDC GLUCOMTR-MCNC: 182 MG/DL (ref 70–130)
GLUCOSE BLDC GLUCOMTR-MCNC: 81 MG/DL (ref 70–130)
GLUCOSE BLDC GLUCOMTR-MCNC: 83 MG/DL (ref 70–130)
GLUCOSE BLDC GLUCOMTR-MCNC: 86 MG/DL (ref 70–130)
GLUCOSE BLDC GLUCOMTR-MCNC: 95 MG/DL (ref 70–130)
GLUCOSE SERPL-MCNC: 71 MG/DL (ref 65–99)
HCT VFR BLD AUTO: 27.1 % (ref 34–46.6)
HCT VFR BLD AUTO: 29.2 % (ref 34–46.6)
HGB BLD-MCNC: 8.4 G/DL (ref 12–15.9)
HGB BLD-MCNC: 9 G/DL (ref 12–15.9)
IMM GRANULOCYTES # BLD AUTO: 0.13 10*3/MM3 (ref 0–0.05)
IMM GRANULOCYTES # BLD AUTO: 0.22 10*3/MM3 (ref 0–0.05)
IMM GRANULOCYTES NFR BLD AUTO: 1.4 % (ref 0–0.5)
IMM GRANULOCYTES NFR BLD AUTO: 2.2 % (ref 0–0.5)
LDH SERPL-CCNC: 168 U/L (ref 135–214)
LDH SERPL-CCNC: 217 U/L (ref 135–214)
LYMPHOCYTES # BLD AUTO: 1.08 10*3/MM3 (ref 0.7–3.1)
LYMPHOCYTES # BLD AUTO: 1.08 10*3/MM3 (ref 0.7–3.1)
LYMPHOCYTES NFR BLD AUTO: 10.9 % (ref 19.6–45.3)
LYMPHOCYTES NFR BLD AUTO: 11.5 % (ref 19.6–45.3)
MCH RBC QN AUTO: 21.5 PG (ref 26.6–33)
MCH RBC QN AUTO: 21.5 PG (ref 26.6–33)
MCHC RBC AUTO-ENTMCNC: 30.8 G/DL (ref 31.5–35.7)
MCHC RBC AUTO-ENTMCNC: 31 G/DL (ref 31.5–35.7)
MCV RBC AUTO: 69.3 FL (ref 79–97)
MCV RBC AUTO: 69.9 FL (ref 79–97)
MONOCYTES # BLD AUTO: 0.75 10*3/MM3 (ref 0.1–0.9)
MONOCYTES # BLD AUTO: 0.77 10*3/MM3 (ref 0.1–0.9)
MONOCYTES NFR BLD AUTO: 7.6 % (ref 5–12)
MONOCYTES NFR BLD AUTO: 8.2 % (ref 5–12)
NEUTROPHILS NFR BLD AUTO: 7.17 10*3/MM3 (ref 1.7–7)
NEUTROPHILS NFR BLD AUTO: 7.68 10*3/MM3 (ref 1.7–7)
NEUTROPHILS NFR BLD AUTO: 76.7 % (ref 42.7–76)
NEUTROPHILS NFR BLD AUTO: 77.9 % (ref 42.7–76)
PLATELET # BLD AUTO: 263 10*3/MM3 (ref 140–450)
PLATELET # BLD AUTO: 269 10*3/MM3 (ref 140–450)
PMV BLD AUTO: 10.5 FL (ref 6–12)
PMV BLD AUTO: 10.6 FL (ref 6–12)
POTASSIUM SERPL-SCNC: 3.7 MMOL/L (ref 3.5–5.2)
PROT SERPL-MCNC: 5.9 G/DL (ref 6–8.5)
RBC # BLD AUTO: 3.91 10*6/MM3 (ref 3.77–5.28)
RBC # BLD AUTO: 4.18 10*6/MM3 (ref 3.77–5.28)
RH BLD: POSITIVE
SODIUM SERPL-SCNC: 137 MMOL/L (ref 136–145)
T&S EXPIRATION DATE: NORMAL
URATE SERPL-MCNC: 4.1 MG/DL (ref 2.4–5.7)
URATE SERPL-MCNC: 4.3 MG/DL (ref 2.4–5.7)
WBC NRBC COR # BLD AUTO: 9.36 10*3/MM3 (ref 3.4–10.8)
WBC NRBC COR # BLD AUTO: 9.87 10*3/MM3 (ref 3.4–10.8)

## 2024-05-12 PROCEDURE — 25810000003 LACTATED RINGERS PER 1000 ML: Performed by: OBSTETRICS & GYNECOLOGY

## 2024-05-12 PROCEDURE — 86850 RBC ANTIBODY SCREEN: CPT | Performed by: OBSTETRICS & GYNECOLOGY

## 2024-05-12 PROCEDURE — 63710000001 INSULIN GLARGINE PER 5 UNITS: Performed by: OBSTETRICS & GYNECOLOGY

## 2024-05-12 PROCEDURE — 25810000003 SODIUM CHLORIDE 0.9 % SOLUTION: Performed by: OBSTETRICS & GYNECOLOGY

## 2024-05-12 PROCEDURE — 86901 BLOOD TYPING SEROLOGIC RH(D): CPT | Performed by: OBSTETRICS & GYNECOLOGY

## 2024-05-12 PROCEDURE — 84550 ASSAY OF BLOOD/URIC ACID: CPT | Performed by: OBSTETRICS & GYNECOLOGY

## 2024-05-12 PROCEDURE — 82948 REAGENT STRIP/BLOOD GLUCOSE: CPT

## 2024-05-12 PROCEDURE — 83615 LACTATE (LD) (LDH) ENZYME: CPT | Performed by: OBSTETRICS & GYNECOLOGY

## 2024-05-12 PROCEDURE — 86900 BLOOD TYPING SEROLOGIC ABO: CPT | Performed by: OBSTETRICS & GYNECOLOGY

## 2024-05-12 PROCEDURE — 85025 COMPLETE CBC W/AUTO DIFF WBC: CPT | Performed by: OBSTETRICS & GYNECOLOGY

## 2024-05-12 PROCEDURE — 99231 SBSQ HOSP IP/OBS SF/LOW 25: CPT | Performed by: OBSTETRICS & GYNECOLOGY

## 2024-05-12 PROCEDURE — 59025 FETAL NON-STRESS TEST: CPT

## 2024-05-12 PROCEDURE — 80053 COMPREHEN METABOLIC PANEL: CPT | Performed by: OBSTETRICS & GYNECOLOGY

## 2024-05-12 RX ORDER — NIFEDIPINE 10 MG/1
10 CAPSULE ORAL ONCE
Status: COMPLETED | OUTPATIENT
Start: 2024-05-12 | End: 2024-05-12

## 2024-05-12 RX ORDER — SODIUM CHLORIDE, SODIUM LACTATE, POTASSIUM CHLORIDE, CALCIUM CHLORIDE 600; 310; 30; 20 MG/100ML; MG/100ML; MG/100ML; MG/100ML
125 INJECTION, SOLUTION INTRAVENOUS CONTINUOUS
Status: DISCONTINUED | OUTPATIENT
Start: 2024-05-12 | End: 2024-05-13

## 2024-05-12 RX ADMIN — ACETAMINOPHEN 325MG 650 MG: 325 TABLET ORAL at 21:49

## 2024-05-12 RX ADMIN — Medication 10 ML: at 21:42

## 2024-05-12 RX ADMIN — Medication 1 TABLET: at 10:33

## 2024-05-12 RX ADMIN — INSULIN GLARGINE 20 UNITS: 100 INJECTION, SOLUTION SUBCUTANEOUS at 21:17

## 2024-05-12 RX ADMIN — POLYETHYLENE GLYCOL 3350 17 G: 17 POWDER, FOR SOLUTION ORAL at 15:53

## 2024-05-12 RX ADMIN — FERROUS SULFATE TAB 325 MG (65 MG ELEMENTAL FE) 325 MG: 325 (65 FE) TAB at 10:33

## 2024-05-12 RX ADMIN — DOCUSATE SODIUM 100 MG: 100 CAPSULE, LIQUID FILLED ORAL at 10:33

## 2024-05-12 RX ADMIN — SODIUM CHLORIDE, POTASSIUM CHLORIDE, SODIUM LACTATE AND CALCIUM CHLORIDE 125 ML/HR: 600; 310; 30; 20 INJECTION, SOLUTION INTRAVENOUS at 22:54

## 2024-05-12 RX ADMIN — SODIUM CHLORIDE 1000 ML: 9 INJECTION, SOLUTION INTRAVENOUS at 21:42

## 2024-05-12 RX ADMIN — DOCUSATE SODIUM 100 MG: 100 CAPSULE, LIQUID FILLED ORAL at 21:17

## 2024-05-12 RX ADMIN — ACETAMINOPHEN 325MG 650 MG: 325 TABLET ORAL at 10:32

## 2024-05-12 RX ADMIN — NIFEDIPINE 10 MG: 10 CAPSULE ORAL at 21:32

## 2024-05-12 RX ADMIN — INSULIN GLARGINE 16 UNITS: 100 INJECTION, SOLUTION SUBCUTANEOUS at 06:47

## 2024-05-12 NOTE — NURSING NOTE
This RN following up with pt regarding lower abdominal cramping. Upon assessment, pt has no palpated ctx or uterine tenderness. She has had some right sided soreness for a few days, but beyond that she describes all of her discomfort as lower abdominal cramping.     Pt has not had a bowel movement since Wednesday. We discussed the possibilities of constipation considering that she has not had a bowel movement, has not been as active as she usually is and has been receiving iron regularly along with receiving an iron infusion yesterday.     Pt still states that it is lower abdominal cramping that is the most uncomfortable. She denies any vision changes, epigastric pain, lower back or flank pain. Her uterus palpates soft and her EFM shows irritability for uterine activity and a reactive FHR.     Upon discussion, pt states that she wants to go ahead and take the Miralax she refused this morning, wants to shower and ambulate with her significant other to see if that will help. At the moment of assessment, she states that the cramping has decreased. She confirmed and verbalized understanding to call out should her pain worsen.     This RN will contact MD and update him with pt status.

## 2024-05-12 NOTE — NURSING NOTE
Uterine activity subsided, so EFM removed. Pt states she still has mild cramping, but also needs to use the bathroom and void. Pt states that she will call out to RN if cramping continues, or if she starts feeling any tightening or vaginal pressure.

## 2024-05-12 NOTE — NON STRESS TEST
Abdon Andres, a  at 35w3d with an VANE of 2024, by Last Menstrual Period, was seen at [address] for a nonstress test.    Chief Complaint   Patient presents with    Contractions     35.0 cramping       Patient Active Problem List   Diagnosis    Pre-eclampsia in third trimester, without severe features    Insulin controlled gestational diabetes mellitus (GDM) in third trimester       Start Time:   Stop Time:     Interpretation A  Nonstress Test Interpretation A: Reactive

## 2024-05-12 NOTE — PLAN OF CARE
Goal Outcome Evaluation:  Plan of Care Reviewed With: patient        Progress: no change  Outcome Evaluation: VSS, RNST and +FM reported. Pt denies VB, LOF and painful, regular contractions. Pt denies visual changes, and RUQ/epigastric pain. Mild HA reported that resolved with Tylenol. Pt remains in good spirits.

## 2024-05-12 NOTE — NON STRESS TEST
Abdon Andres, samantha  at 35w3d with an VANE of 2024, by Last Menstrual Period, was seen at [address] for a nonstress test.    Chief Complaint   Patient presents with    Contractions     35.0 cramping       Patient Active Problem List   Diagnosis    Pre-eclampsia in third trimester, without severe features    Insulin controlled gestational diabetes mellitus (GDM) in third trimester       Start Time: 1041  Stop Time: 1151    Interpretation A  Nonstress Test Interpretation A: Reactive  Comments A: Appropriate for gestational age and admitting diagnosis. Extended monitoring d/t pt stating she feels lower abdominal cramping and wanting to monitor uterine activity. Fetal movement increased, and both audibly and visually confirmed.  Interpretation B  Nonstress Test Interpretation B: Reactive

## 2024-05-12 NOTE — NON STRESS TEST
Abdon Andres, samantha  at 35w2d with an VANE of 2024, by Last Menstrual Period, was seen at [address] for a nonstress test.    Chief Complaint   Patient presents with    Contractions     35.0 cramping       Patient Active Problem List   Diagnosis    Pre-eclampsia in third trimester, without severe features    Insulin controlled gestational diabetes mellitus (GDM) in third trimester       Start Time: 1001  Stop Time: 1049    Interpretation A  Nonstress Test Interpretation A: Reactive  Comments A: Appropriate for gestational age and admitting diagnosis. Pt denies feeling any ctx.  Interpretation B  Nonstress Test Interpretation B: Reactive

## 2024-05-12 NOTE — PROGRESS NOTES
DAILY PROGRESS NOTE    Patient Identification:  Name: Abdon Andres  Age: 23 y.o.  Sex: female  :  2000  MRN: [id number]               Subjective:  Interval History: 23-year-old  1 para 0 at 35-3/7 weeks.  The patient was admitted for preeclampsia without severe features.  She also has a 2 diabetes requiring insulin.  Overall, she is doing well.  She has a mild headache, but no abdominal pain.  Blood pressures have been normal to low overnight.    Objective:    Scheduled Meds:docusate sodium, 100 mg, Oral, BID  ferrous sulfate, 325 mg, Oral, Daily With Breakfast  insulin glargine, 16 Units, Subcutaneous, QAM  insulin glargine, 20 Units, Subcutaneous, Nightly  polyethylene glycol, 17 g, Oral, Daily  prenatal vitamin, 1 tablet, Oral, Daily  sodium chloride, 10 mL, Intravenous, Q12H      Continuous Infusions:   PRN Meds:  acetaminophen    bisacodyl    calcium carbonate    hydrOXYzine    lidocaine    ondansetron ODT **OR** ondansetron    sodium chloride    sodium chloride    Vital signs in last 24 hours:  Temp:  [98 °F (36.7 °C)-98.1 °F (36.7 °C)] 98.1 °F (36.7 °C)  Heart Rate:  [71-82] 80  Resp:  [16-17] 17  BP: (107-135)/(61-89) 117/69    Intake/Output:    Intake/Output Summary (Last 24 hours) at 2024 1000  Last data filed at 2024  Gross per 24 hour   Intake 303.41 ml   Output --   Net 303.41 ml       Exam:  General Appearance:    Alert, cooperative, no distress, appears stated age   Lungs:     Clear to auscultation bilaterally, respirations unlabored    Heart:    Regular rate and rhythm, S1 and S2 normal, no murmur, rub   or gallop   Abdomen:   Soft, gravid.  There is no epigastric tenderness.  No right upper quadrant tenderness.  No suprapubic tenderness.   Extremities: Equal in size   Skin:   Skin color, texture, turgor normal, no rashes or lesions        Data Review: NST is reactive    Lab Results (last 24 hours)       Procedure Component Value Units Date/Time    POC  Glucose Once [[id number]]  (Normal) Collected: 05/12/24 0644    Specimen: Blood Updated: 05/12/24 0645     Glucose 83 mg/dL     CBC & Differential [[id number]]  (Abnormal) Collected: 05/12/24 0511    Specimen: Blood Updated: 05/12/24 0644    Narrative:      The following orders were created for panel order CBC & Differential.  Procedure                               Abnormality         Status                     ---------                               -----------         ------                     CBC Auto Differential[[id number]]        Abnormal            Final result                 Please view results for these tests on the individual orders.    CBC Auto Differential [[id number]]  (Abnormal) Collected: 05/12/24 0511    Specimen: Blood Updated: 05/12/24 0644     WBC 9.36 10*3/mm3      RBC 3.91 10*6/mm3      Hemoglobin 8.4 g/dL      Hematocrit 27.1 %      MCV 69.3 fL      MCH 21.5 pg      MCHC 31.0 g/dL      RDW 16.0 %      RDW-SD 39.4 fl      MPV 10.6 fL      Platelets 263 10*3/mm3      Neutrophil % 76.7 %      Lymphocyte % 11.5 %      Monocyte % 8.2 %      Eosinophil % 1.9 %      Basophil % 0.3 %      Immature Grans % 1.4 %      Neutrophils, Absolute 7.17 10*3/mm3      Lymphocytes, Absolute 1.08 10*3/mm3      Monocytes, Absolute 0.77 10*3/mm3      Eosinophils, Absolute 0.18 10*3/mm3      Basophils, Absolute 0.03 10*3/mm3      Immature Grans, Absolute 0.13 10*3/mm3     Lactate Dehydrogenase [[id number]]  (Abnormal) Collected: 05/12/24 0511    Specimen: Blood Updated: 05/12/24 0643      U/L     Comprehensive Metabolic Panel [[id number]]  (Abnormal) Collected: 05/12/24 0511    Specimen: Blood Updated: 05/12/24 0643     Glucose 71 mg/dL      BUN 7 mg/dL      Creatinine 0.48 mg/dL      Sodium 137 mmol/L      Potassium 3.7 mmol/L      Chloride 108 mmol/L      CO2 19.0 mmol/L      Calcium 8.7 mg/dL      Total Protein 5.9 g/dL      Albumin 2.9 g/dL      ALT (SGPT) 31 U/L      AST (SGOT) 17 U/L      Alkaline Phosphatase  158 U/L      Total Bilirubin 0.2 mg/dL      Globulin 3.0 gm/dL      A/G Ratio 1.0 g/dL      BUN/Creatinine Ratio 14.6     Anion Gap 10.0 mmol/L      eGFR 138.4 mL/min/1.73     Narrative:      GFR Normal >60  Chronic Kidney Disease <60  Kidney Failure <15      Uric Acid [[id number]]  (Normal) Collected: 05/12/24 0511    Specimen: Blood Updated: 05/12/24 0643     Uric Acid 4.3 mg/dL     POC Glucose Once [[id number]]  (Abnormal) Collected: 05/11/24 1725    Specimen: Blood Updated: 05/11/24 1727     Glucose 154 mg/dL     POC Glucose Once [[id number]]  (Normal) Collected: 05/11/24 1610    Specimen: Blood Updated: 05/11/24 1611     Glucose 120 mg/dL     POC Glucose Once [[id number]]  (Normal) Collected: 05/11/24 1331    Specimen: Blood Updated: 05/11/24 1332     Glucose 113 mg/dL     POC Glucose Once [[id number]]  (Normal) Collected: 05/11/24 1138    Specimen: Blood Updated: 05/11/24 1140     Glucose 77 mg/dL     Ferritin [[id number]]  (Abnormal) Collected: 05/11/24 0759    Specimen: Blood Updated: 05/11/24 1100     Ferritin 12.60 ng/mL     Narrative:      Results may be falsely decreased if patient taking Biotin.      Iron Profile [[id number]]  (Abnormal) Collected: 05/11/24 0759    Specimen: Blood Updated: 05/11/24 1055     Iron 20 mcg/dL      Iron Saturation (TSAT) 3 %      Transferrin 460 mg/dL      TIBC 685 mcg/dL           Assessment:    Pre-eclampsia in third trimester, without severe features    Insulin controlled gestational diabetes mellitus (GDM) in third trimester    1.  Intrauterine pregnancy at 35-2/7 weeks  2.  Diagnosis of preeclampsia without severe features.  We have been following the patient's platelets and liver function tests.  Platelets remain normal.  The patient's liver function tests have been elevated, but are now in the normal range.  Blood pressures are low to normal.  It is uncertain if the patient in fact has preeclampsia or if this was a transient phenomenon which has improved.  I recommend  continued bedrest in the hospital today.  We will repeat protein to creatinine ratio tomorrow along with preeclampsia labs.  Further recommendations pending the results of this.  3.  Gestational diabetes requiring insulin.  Control has been adequate.        Nelson Moeller MD  5/12/2024

## 2024-05-12 NOTE — NON STRESS TEST
Abdon Andres, samantha  at 35w3d with an VANE of 2024, by Last Menstrual Period, was seen at [address] for a nonstress test.    Chief Complaint   Patient presents with    Contractions     35.0 cramping       Patient Active Problem List   Diagnosis    Pre-eclampsia in third trimester, without severe features    Insulin controlled gestational diabetes mellitus (GDM) in third trimester       Start Time: 1512  Stop Time: 1554    Interpretation A  Nonstress Test Interpretation A: Reactive  Comments A: EFM placed on pt for reported lower abdominal cramping.  Interpretation B  Nonstress Test Interpretation B: Reactive

## 2024-05-12 NOTE — PLAN OF CARE
Goal Outcome Evaluation:              Outcome Evaluation: VSS.  RNST. +FM reported, but pt reports forgetting to do FKC.  Denies LOF, VB, ctx, vision changes, RUQ/epigastric pain.  Pt has persistent mild h/a that resolves with Tylenol but then returns.  Pt has not had BM since Wednesday and had c/o intense stomach pain.  Assessment negative for labor and worsening pre-E.  Suspect constipation and MD in agreement.  Pt given Miralax.

## 2024-05-12 NOTE — NURSING NOTE
"Pt called out requesting RN assistance.  Upon entering room, pt was sitting upright in bed, hunched over and holding her abdomen.  Pt reports that she just woke and is having diffuse abdominal pain since she woke up that she rates 6 out of 10 on the pain scale.  Pt abdomen.  Palpates soft and nontender.  Pt denies epigastric, flank, RUQ, or lower back pain.  Pt states the pain is constant.  Pt states the pain did not wake her up, but that it began once awakened.  Pt blood pressure and O2, MHR evaluated.  Pt reports that she has not had a BM since Wednesday and that is normal for her \"sometimes.\"  Pt reports that she has a mild, constant h/a, that has been persistent.  Pt denies vision changes.  Assisted pt up and had her void.  Voided qs, clear yellow urine.  Pt returned to bed and placed on EFM for further evaluation.  Pt lying semi-fowlers and reports that lying back helps the pain lessen.  Primary RN notified and she will continue further assessment.  "

## 2024-05-12 NOTE — PLAN OF CARE
Goal Outcome Evaluation:           Progress: no change  Outcome Evaluation: VSS, RNST, +FM reported. Pt denies LOF, VB, ctx. Pt reports mild HA that has intermittently resolved with Tylenol, but is recurring. Denies visual changes or RUQ pain. Blood sugars were WNL today. Pt missed baby shower d/t inpatient stay. Family and friends brought treats from her shower. Pt received an iron infusion today as well based on her lab results from this morning. Pt remains in good spirits despite long inpatient stay.

## 2024-05-12 NOTE — NURSING NOTE
When asked if pt was feeling any ctx, pt states that she is feeling cramping and some back pain. Pt rates cramping pain 6/10. Pt denies any vaginal pressure or tightening. RN palpated abdomen and no ctx noted. Pt agreed with POC to continue monitoring for a bit longer to see if ctx subside.

## 2024-05-13 VITALS
HEART RATE: 78 BPM | OXYGEN SATURATION: 100 % | WEIGHT: 212.1 LBS | RESPIRATION RATE: 16 BRPM | SYSTOLIC BLOOD PRESSURE: 127 MMHG | TEMPERATURE: 97.9 F | DIASTOLIC BLOOD PRESSURE: 60 MMHG | BODY MASS INDEX: 32.15 KG/M2 | HEIGHT: 68 IN

## 2024-05-13 LAB
ALBUMIN SERPL-MCNC: 3.1 G/DL (ref 3.5–5.2)
ALBUMIN/GLOB SERPL: 1.1 G/DL
ALP SERPL-CCNC: 173 U/L (ref 39–117)
ALT SERPL W P-5'-P-CCNC: 28 U/L (ref 1–33)
ANION GAP SERPL CALCULATED.3IONS-SCNC: 9 MMOL/L (ref 5–15)
AST SERPL-CCNC: 18 U/L (ref 1–32)
BASOPHILS # BLD AUTO: 0.03 10*3/MM3 (ref 0–0.2)
BASOPHILS NFR BLD AUTO: 0.3 % (ref 0–1.5)
BILIRUB SERPL-MCNC: <0.2 MG/DL (ref 0–1.2)
BUN SERPL-MCNC: 5 MG/DL (ref 6–20)
BUN/CREAT SERPL: 8.8 (ref 7–25)
CALCIUM SPEC-SCNC: 9.1 MG/DL (ref 8.6–10.5)
CHLORIDE SERPL-SCNC: 106 MMOL/L (ref 98–107)
CO2 SERPL-SCNC: 23 MMOL/L (ref 22–29)
CREAT SERPL-MCNC: 0.57 MG/DL (ref 0.57–1)
CREAT UR-MCNC: 38.1 MG/DL
DEPRECATED RDW RBC AUTO: 39.9 FL (ref 37–54)
EGFRCR SERPLBLD CKD-EPI 2021: 132.8 ML/MIN/1.73
EOSINOPHIL # BLD AUTO: 0.15 10*3/MM3 (ref 0–0.4)
EOSINOPHIL NFR BLD AUTO: 1.6 % (ref 0.3–6.2)
ERYTHROCYTE [DISTWIDTH] IN BLOOD BY AUTOMATED COUNT: 16.3 % (ref 12.3–15.4)
GLOBULIN UR ELPH-MCNC: 2.9 GM/DL
GLUCOSE BLDC GLUCOMTR-MCNC: 117 MG/DL (ref 70–130)
GLUCOSE BLDC GLUCOMTR-MCNC: 81 MG/DL (ref 70–130)
GLUCOSE BLDC GLUCOMTR-MCNC: 85 MG/DL (ref 70–130)
GLUCOSE SERPL-MCNC: 68 MG/DL (ref 65–99)
HCT VFR BLD AUTO: 28 % (ref 34–46.6)
HGB BLD-MCNC: 8.7 G/DL (ref 12–15.9)
IMM GRANULOCYTES # BLD AUTO: 0.22 10*3/MM3 (ref 0–0.05)
IMM GRANULOCYTES NFR BLD AUTO: 2.4 % (ref 0–0.5)
LDH SERPL-CCNC: 141 U/L (ref 135–214)
LYMPHOCYTES # BLD AUTO: 1.01 10*3/MM3 (ref 0.7–3.1)
LYMPHOCYTES NFR BLD AUTO: 11 % (ref 19.6–45.3)
MCH RBC QN AUTO: 21.7 PG (ref 26.6–33)
MCHC RBC AUTO-ENTMCNC: 31.1 G/DL (ref 31.5–35.7)
MCV RBC AUTO: 69.8 FL (ref 79–97)
MONOCYTES # BLD AUTO: 0.61 10*3/MM3 (ref 0.1–0.9)
MONOCYTES NFR BLD AUTO: 6.7 % (ref 5–12)
NEUTROPHILS NFR BLD AUTO: 7.14 10*3/MM3 (ref 1.7–7)
NEUTROPHILS NFR BLD AUTO: 78 % (ref 42.7–76)
PLATELET # BLD AUTO: 253 10*3/MM3 (ref 140–450)
PMV BLD AUTO: 10 FL (ref 6–12)
POTASSIUM SERPL-SCNC: 3.9 MMOL/L (ref 3.5–5.2)
PROT ?TM UR-MCNC: 4.9 MG/DL
PROT SERPL-MCNC: 6 G/DL (ref 6–8.5)
PROT/CREAT UR: 128.6 MG/G CREA (ref 0–200)
RBC # BLD AUTO: 4.01 10*6/MM3 (ref 3.77–5.28)
SODIUM SERPL-SCNC: 138 MMOL/L (ref 136–145)
URATE SERPL-MCNC: 4.4 MG/DL (ref 2.4–5.7)
WBC NRBC COR # BLD AUTO: 9.16 10*3/MM3 (ref 3.4–10.8)

## 2024-05-13 PROCEDURE — 82570 ASSAY OF URINE CREATININE: CPT | Performed by: OBSTETRICS & GYNECOLOGY

## 2024-05-13 PROCEDURE — 25010000002 KETOROLAC TROMETHAMINE PER 15 MG: Performed by: OBSTETRICS & GYNECOLOGY

## 2024-05-13 PROCEDURE — 84156 ASSAY OF PROTEIN URINE: CPT | Performed by: OBSTETRICS & GYNECOLOGY

## 2024-05-13 PROCEDURE — 25810000003 LACTATED RINGERS PER 1000 ML: Performed by: OBSTETRICS & GYNECOLOGY

## 2024-05-13 PROCEDURE — 99238 HOSP IP/OBS DSCHRG MGMT 30/<: CPT | Performed by: OBSTETRICS & GYNECOLOGY

## 2024-05-13 PROCEDURE — 80053 COMPREHEN METABOLIC PANEL: CPT | Performed by: OBSTETRICS & GYNECOLOGY

## 2024-05-13 PROCEDURE — 84550 ASSAY OF BLOOD/URIC ACID: CPT | Performed by: OBSTETRICS & GYNECOLOGY

## 2024-05-13 PROCEDURE — 63710000001 INSULIN GLARGINE PER 5 UNITS: Performed by: OBSTETRICS & GYNECOLOGY

## 2024-05-13 PROCEDURE — 82948 REAGENT STRIP/BLOOD GLUCOSE: CPT

## 2024-05-13 PROCEDURE — 25010000002 ONDANSETRON PER 1 MG: Performed by: OBSTETRICS & GYNECOLOGY

## 2024-05-13 PROCEDURE — 85025 COMPLETE CBC W/AUTO DIFF WBC: CPT | Performed by: OBSTETRICS & GYNECOLOGY

## 2024-05-13 PROCEDURE — 83615 LACTATE (LD) (LDH) ENZYME: CPT | Performed by: OBSTETRICS & GYNECOLOGY

## 2024-05-13 RX ORDER — KETOROLAC TROMETHAMINE 15 MG/ML
15 INJECTION, SOLUTION INTRAMUSCULAR; INTRAVENOUS ONCE
Status: COMPLETED | OUTPATIENT
Start: 2024-05-13 | End: 2024-05-13

## 2024-05-13 RX ADMIN — Medication 1 TABLET: at 08:12

## 2024-05-13 RX ADMIN — INSULIN GLARGINE 16 UNITS: 100 INJECTION, SOLUTION SUBCUTANEOUS at 08:13

## 2024-05-13 RX ADMIN — FERROUS SULFATE TAB 325 MG (65 MG ELEMENTAL FE) 325 MG: 325 (65 FE) TAB at 08:12

## 2024-05-13 RX ADMIN — ACETAMINOPHEN 325MG 650 MG: 325 TABLET ORAL at 10:36

## 2024-05-13 RX ADMIN — KETOROLAC TROMETHAMINE 15 MG: 15 INJECTION, SOLUTION INTRAMUSCULAR; INTRAVENOUS at 12:18

## 2024-05-13 RX ADMIN — DOCUSATE SODIUM 100 MG: 100 CAPSULE, LIQUID FILLED ORAL at 08:12

## 2024-05-13 RX ADMIN — ONDANSETRON 4 MG: 2 INJECTION INTRAMUSCULAR; INTRAVENOUS at 10:59

## 2024-05-13 RX ADMIN — SODIUM CHLORIDE, POTASSIUM CHLORIDE, SODIUM LACTATE AND CALCIUM CHLORIDE 125 ML/HR: 600; 310; 30; 20 INJECTION, SOLUTION INTRAVENOUS at 07:07

## 2024-05-13 NOTE — PROGRESS NOTES
The patient complained of painful contractions which were also apparent on the monitor.  Her baby was moving actively.  The patient's nurse checked her cervix, which had not changed.  In view of persistent contractions which were causing intense pain, the patient was transferred to labor and delivery for closer observation.  She reports that her contractions have spaced out and are now no longer intense.  Fetal heart tones are category 1.  The patient's abdomen is soft and gravid.  There is no epigastric tenderness.  No fundal tenderness.  No CVA tenderness.  No suprapubic tenderness.    Assessment and plan    Episode of painful contractions, now improving.  No evidence of placental abruption.  I counseled the patient and answered her questions.  We will monitor her on labor and delivery.  Continue IV fluids.  I answered the patient's questions and she agrees with these recommendations.

## 2024-05-13 NOTE — PLAN OF CARE
Problem:  Labor  Goal: Delayed  Delivery  Outcome: Met     Problem: Diabetes in Pregnancy  Goal: Blood Glucose Level Within Targeted Range  Outcome: Met     Problem: Hypertensive Disorders in Pregnancy  Goal: Maternal-Fetal Stabilization  Outcome: Met     Problem: VTE (Venous Thromboembolism)  Goal: VTE (Venous Thromboembolism) Symptom Resolution  Outcome: Met   Goal Outcome Evaluation:  Plan of Care Reviewed With: patient

## 2024-05-13 NOTE — DISCHARGE SUMMARY
Date of Discharge:  5/13/2024    Discharge Diagnosis: 35-week gestation, undelivered    Presenting Problem/History of Present Illness  Active Hospital Problems    Diagnosis  POA    **Pre-eclampsia in third trimester, without severe features [O14.93]  Yes    Insulin controlled gestational diabetes mellitus (GDM) in third trimester [O24.414]  Yes      Resolved Hospital Problems   No resolved problems to display.      Hospital Course  Patient is a 23 y.o. female presented with admitted on 5/9/2024 with elevated blood pressure and concern for preeclampsia.  She was monitored serially..  Her blood pressures have all settled into normal to very mildly elevated ranges on no medications.  She had a 24-hour urine protein that was 246 mg which was not consistent with preeclampsia.  She had a very mildly elevated ALT with normal AST on admission.  Her liver enzymes were in the normal range on 5/12/2024.  She denied any persistent headache or blurred vision or right upper quadrant pain.  Fetal status was stable with reactive and reassuring monitoring.  She is class A-II gestational diabetic and did very well with glucose control for the most part on her insulin regimen.  She had a PC ratio done on the day of discharge that came back at 128.  This is normal.  She was feeling normal movement and no significant contractions and stable for discharge home.  She has a follow-up appointment on 5/15/2024.    Procedures Performed         Consults:   Consults       Date and Time Order Name Status Description    5/9/2024  5:31 PM Inpatient Neonatology Consult      5/9/2024  5:31 PM Inpatient Maternal & Fetal Medicine Consult              Pertinent Test Results:  please see above for pertinent labs    Condition on Discharge: Stable and improved from admission    Vital Signs  Temp:  [97.9 °F (36.6 °C)-98.1 °F (36.7 °C)] 97.9 °F (36.6 °C)  Heart Rate:  [70-86] 70  Resp:  [16-18] 17  BP: (122-145)/(52-74) 122/68    Physical Exam:   General  Appearance: alert, appears stated age, and cooperative  Abdomen: normal bowel sounds, no masses, no hepatomegaly, no splenomegaly, soft non-tender, no guarding, and no rebound tenderness  Extremities: moves extremities well, no edema, no cyanosis, and no redness    Discharge Disposition  Home or Self Care    Discharge Medications     Discharge Medications        Continue These Medications        Instructions Start Date   Accu-Chek Guide w/Device kit   USE 1 KIT TO CHECK BLOOD GLUCOSE      Accu-Chek Softclix Lancets lancets   USE 1 LANCET TO GET BLOOD SAMPLE FOR BLOOD GLUCOSE TESTING FOUR TIMES DAILY      BD Pen Needle Melanie 2nd Gen 32G X 4 MM misc  Generic drug: Insulin Pen Needle   1 each, Does not apply, 2 Times Daily      docusate sodium 100 MG capsule  Commonly known as: Colace   100 mg, Oral, 2 Times Daily      docusate sodium 100 MG capsule  Commonly known as: COLACE   100 mg, Oral, 2 Times Daily      ferrous sulfate 325 (65 FE) MG tablet   325 mg, Oral, Daily With Breakfast      fluticasone 0.005 % ointment  Commonly known as: CUTIVATE   APPLY TO AFFECTED AREAS OF THE BODY TWICE DAILY      glucose blood test strip   Take blood sugar fasting (AM) and 2 hours after start of breakfast, lunch and dinner      hydrocortisone 2.5 % ointment   APPLY TO AFFECTED AREA OF FACE TWICE DAILY      Insulin Glargine 100 UNIT/ML injection pen  Commonly known as: LANTUS SOLOSTAR   Inject 16 units under the skin each AM and 20 units under the skin each evening      polyethylene glycol 17 g packet  Commonly known as: MiraLax   17 g, Oral, Daily      prenatal (CLASSIC) vitamin 28-0.8 MG tablet tablet  Generic drug: prenatal vitamin   Oral, Daily               Discharge Diet:     Activity at Discharge:     Follow-up Appointments  Future Appointments   Date Time Provider Department Center   5/15/2024  1:00 PM [facility]   5/15/2024  1:15 PM Eliz Quiroz CNM [facility]   5/22/2024  1:00 PM [facility]   5/22/2024  1:50 PM Nelson Moeller MD [facility]   5/29/2024  2:00 PM [facility]   5/29/2024  2:50 PM Nelson Moeller MD [facility]   6/5/2024  1:00 PM [facility]   6/5/2024  1:50 PM Nelson Moeller MD [facility]         Test Results Pending at Discharge       Ray Mixon MD  05/13/24  16:02 EDT    Time:

## 2024-05-13 NOTE — PROGRESS NOTES
OB VISIT 35 WEEKS      Chief Complaint   Patient presents with    Routine Prenatal Visit         Abdon is a 23 y.o.  35w6d being seen today for her obstetrical visit.  Patient reports no complaints. Fetal movement: normal. The patient currently sees Dr. Moeller.       REVIEW OF SYSTEMS  Cramping/contractions: denies  Vaginal bleeding: denies  Fetal movement: present  Leaking of fluid: denies    Review of Systems   Eyes:  Negative for blurred vision, double vision and visual disturbance.   Gastrointestinal:  Negative for abdominal pain.   Neurological:  Negative for light-headedness and headache.   All other systems reviewed and are negative.      /80   Wt 95.3 kg (210 lb)   LMP 2023   BMI 31.93 kg/m²     Prenatal Assessment  Fetal Heart Rate: 142  Fundal Height (cm): 36 cm  Movement: Present  Presentation: Vertex  Edema  LLE Edema: Trace  RLE Edema: Trace  Facial Edema: Trace    Physical Exam  Constitutional:       General: She is awake.      Appearance: Normal appearance. She is well-developed and well-groomed.   HENT:      Head: Normocephalic and atraumatic.   Pulmonary:      Effort: Pulmonary effort is normal.   Musculoskeletal:      Cervical back: Normal range of motion.   Neurological:      General: No focal deficit present.      Mental Status: She is alert and oriented to person, place, and time.   Skin:     General: Skin is warm and dry.   Psychiatric:         Mood and Affect: Mood normal.         Behavior: Behavior normal. Behavior is cooperative.   Vitals reviewed.         ASSESSMENT/PLAN    Diagnoses and all orders for this visit:    1. Third trimester pregnancy (Primary)  -     POC Urinalysis Dipstick  -     Strep Gp B Culture+Rfx ([facility]) - Swab, Vaginal/Rectum    2. 35 weeks gestation of pregnancy  -     POC Urinalysis Dipstick  -     Strep Gp B Culture+Rfx ([facility]) - Swab, Vaginal/Rectum       [facility] recommending delivery at 37 weeks.   Urine dip today:  trace protein, negative  glucose.   Reviewed fetal kick counts.  Reviewed this stage of pregnancy.  GBS collected and sent today.   Start increased fetal surveillance 2 times a week per M request.   Problem list updated.     Follow up in 2 days with Dr. Moeller to include NST.     I spent 15 minutes caring for Abdon on this date of service. This time includes time spent by me in the following activities: preparing for the visit, reviewing tests, obtaining and/or reviewing a separately obtained history, performing a medically appropriate examination and/or evaluation, counseling and educating the patient/family/caregiver, ordering medications, tests, or procedures, referring and communicating with other health care professionals, documenting information in the medical record, independently interpreting results and communicating that information with the patient/family/caregiver, and care coordination.     Eliz Quiroz CNM  5/15/2024  15:51 EDT      GROWTH ULTRASOUND PRELIMINARY RESULT   5/15/2024  35w6d    BPD: 8.95 cm  HC: 31.92 cm  AC: 32.52 cm  FL: 6.93 cm  OVERALL: 53.1%  EFW: 2859g, 6lb5oz  BPP SCORE: 8/8  BREATHING 2   TONE 2   MOVEMENT 2   FLUID 2   PRESENTATION: vertex  AR: 14.47 cm  PLACENTA: anterior  INTERVAL GROWTH: appropriate, growth appropriate for gestational age.  CONSISTENT WITH DATES: yes   COMPARATIVE DATA: available for examination.  Eliz Quiroz CNM  5/15/2024  14:01 EDT

## 2024-05-13 NOTE — NURSING NOTE
Discharge paperwork discussed; questions and concerns addressed with Dr. Mixon present. Awaiting ride for pickup to leave for home.

## 2024-05-13 NOTE — PLAN OF CARE
Problem: Adult Inpatient Plan of Care  Goal: Plan of Care Review  Outcome: Ongoing, Progressing  Goal: Patient-Specific Goal (Individualized)  Outcome: Ongoing, Progressing  Goal: Absence of Hospital-Acquired Illness or Injury  Outcome: Ongoing, Progressing  Goal: Optimal Comfort and Wellbeing  Outcome: Ongoing, Progressing  Goal: Readiness for Transition of Care  Outcome: Ongoing, Progressing     Problem:  Labor  Goal: Delayed  Delivery  Outcome: Ongoing, Progressing     Problem: Diabetes in Pregnancy  Goal: Blood Glucose Level Within Targeted Range  Outcome: Ongoing, Progressing     Problem: Hypertensive Disorders in Pregnancy  Goal: Maternal-Fetal Stabilization  Outcome: Ongoing, Progressing     Problem: VTE (Venous Thromboembolism)  Goal: VTE (Venous Thromboembolism) Symptom Resolution  Outcome: Ongoing, Progressing   Goal Outcome Evaluation:

## 2024-05-15 ENCOUNTER — HOSPITAL ENCOUNTER (OUTPATIENT)
Facility: HOSPITAL | Age: 24
Setting detail: OBSERVATION
Discharge: HOME | End: 2024-05-16
Attending: OBSTETRICS & GYNECOLOGY | Admitting: OBSTETRICS & GYNECOLOGY
Payer: COMMERCIAL

## 2024-05-15 ENCOUNTER — ROUTINE PRENATAL (OUTPATIENT)
Dept: OBSTETRICS AND GYNECOLOGY | Facility: CLINIC | Age: 24
End: 2024-05-15
Payer: COMMERCIAL

## 2024-05-15 VITALS — DIASTOLIC BLOOD PRESSURE: 80 MMHG | SYSTOLIC BLOOD PRESSURE: 136 MMHG | WEIGHT: 210 LBS | BODY MASS INDEX: 31.93 KG/M2

## 2024-05-15 DIAGNOSIS — Z34.93 THIRD TRIMESTER PREGNANCY: Primary | ICD-10-CM

## 2024-05-15 DIAGNOSIS — Z3A.35 35 WEEKS GESTATION OF PREGNANCY: ICD-10-CM

## 2024-05-15 LAB
ALBUMIN SERPL-MCNC: 3.6 G/DL (ref 3.5–5.2)
ALBUMIN/GLOB SERPL: 1.1 G/DL
ALP SERPL-CCNC: 188 U/L (ref 39–117)
ALT SERPL W P-5'-P-CCNC: 34 U/L (ref 1–33)
ANION GAP SERPL CALCULATED.3IONS-SCNC: 12 MMOL/L (ref 5–15)
AST SERPL-CCNC: 24 U/L (ref 1–32)
BACTERIA UR QL AUTO: ABNORMAL /HPF
BASOPHILS # BLD AUTO: 0.02 10*3/MM3 (ref 0–0.2)
BASOPHILS NFR BLD AUTO: 0.2 % (ref 0–1.5)
BILIRUB SERPL-MCNC: 0.2 MG/DL (ref 0–1.2)
BILIRUB UR QL STRIP: NEGATIVE
BUN SERPL-MCNC: 8 MG/DL (ref 6–20)
BUN/CREAT SERPL: 13.3 (ref 7–25)
CALCIUM SPEC-SCNC: 9.6 MG/DL (ref 8.6–10.5)
CHLORIDE SERPL-SCNC: 105 MMOL/L (ref 98–107)
CLARITY UR: ABNORMAL
CO2 SERPL-SCNC: 20 MMOL/L (ref 22–29)
COLOR UR: YELLOW
CREAT SERPL-MCNC: 0.6 MG/DL (ref 0.57–1)
CREAT UR-MCNC: 38 MG/DL
DEPRECATED RDW RBC AUTO: 39.5 FL (ref 37–54)
EGFRCR SERPLBLD CKD-EPI 2021: 131.2 ML/MIN/1.73
EOSINOPHIL # BLD AUTO: 0.09 10*3/MM3 (ref 0–0.4)
EOSINOPHIL NFR BLD AUTO: 1 % (ref 0.3–6.2)
ERYTHROCYTE [DISTWIDTH] IN BLOOD BY AUTOMATED COUNT: 18.4 % (ref 12.3–15.4)
GLOBULIN UR ELPH-MCNC: 3.2 GM/DL
GLUCOSE SERPL-MCNC: 118 MG/DL (ref 65–99)
GLUCOSE UR STRIP-MCNC: NEGATIVE MG/DL
GLUCOSE UR STRIP-MCNC: NEGATIVE MG/DL
HCT VFR BLD AUTO: 30.2 % (ref 34–46.6)
HGB BLD-MCNC: 9.6 G/DL (ref 12–15.9)
HGB UR QL STRIP.AUTO: NEGATIVE
HYALINE CASTS UR QL AUTO: ABNORMAL /LPF
IMM GRANULOCYTES # BLD AUTO: 0.12 10*3/MM3 (ref 0–0.05)
IMM GRANULOCYTES NFR BLD AUTO: 1.4 % (ref 0–0.5)
KETONES UR QL STRIP: NEGATIVE
LEUKOCYTE EST, POC: ABNORMAL
LEUKOCYTE ESTERASE UR QL STRIP.AUTO: ABNORMAL
LYMPHOCYTES # BLD AUTO: 1.02 10*3/MM3 (ref 0.7–3.1)
LYMPHOCYTES NFR BLD AUTO: 11.5 % (ref 19.6–45.3)
MCH RBC QN AUTO: 22.4 PG (ref 26.6–33)
MCHC RBC AUTO-ENTMCNC: 31.8 G/DL (ref 31.5–35.7)
MCV RBC AUTO: 70.6 FL (ref 79–97)
MONOCYTES # BLD AUTO: 0.64 10*3/MM3 (ref 0.1–0.9)
MONOCYTES NFR BLD AUTO: 7.2 % (ref 5–12)
NEUTROPHILS NFR BLD AUTO: 6.99 10*3/MM3 (ref 1.7–7)
NEUTROPHILS NFR BLD AUTO: 78.7 % (ref 42.7–76)
NITRITE UR QL STRIP: NEGATIVE
PH UR STRIP.AUTO: 7 [PH] (ref 5–8)
PLATELET # BLD AUTO: 266 10*3/MM3 (ref 140–450)
PMV BLD AUTO: 10.2 FL (ref 6–12)
POTASSIUM SERPL-SCNC: 3.9 MMOL/L (ref 3.5–5.2)
PROT ?TM UR-MCNC: 4.6 MG/DL
PROT SERPL-MCNC: 6.8 G/DL (ref 6–8.5)
PROT UR QL STRIP: NEGATIVE
PROT UR STRIP-MCNC: ABNORMAL MG/DL
PROT/CREAT UR: 121.1 MG/G CREA (ref 0–200)
RBC # BLD AUTO: 4.28 10*6/MM3 (ref 3.77–5.28)
RBC # UR STRIP: ABNORMAL /HPF
REF LAB TEST METHOD: ABNORMAL
SODIUM SERPL-SCNC: 137 MMOL/L (ref 136–145)
SP GR UR STRIP: 1.01 (ref 1–1.03)
SQUAMOUS #/AREA URNS HPF: ABNORMAL /HPF
UROBILINOGEN UR QL STRIP: ABNORMAL
WBC # UR STRIP: ABNORMAL /HPF
WBC NRBC COR # BLD AUTO: 8.88 10*3/MM3 (ref 3.4–10.8)

## 2024-05-15 PROCEDURE — 82570 ASSAY OF URINE CREATININE: CPT | Performed by: OBSTETRICS & GYNECOLOGY

## 2024-05-15 PROCEDURE — 85025 COMPLETE CBC W/AUTO DIFF WBC: CPT | Performed by: OBSTETRICS & GYNECOLOGY

## 2024-05-15 PROCEDURE — 80053 COMPREHEN METABOLIC PANEL: CPT | Performed by: OBSTETRICS & GYNECOLOGY

## 2024-05-15 PROCEDURE — 99285 EMERGENCY DEPT VISIT HI MDM: CPT | Performed by: OBSTETRICS & GYNECOLOGY

## 2024-05-15 PROCEDURE — 84156 ASSAY OF PROTEIN URINE: CPT | Performed by: OBSTETRICS & GYNECOLOGY

## 2024-05-15 PROCEDURE — 81001 URINALYSIS AUTO W/SCOPE: CPT | Performed by: OBSTETRICS & GYNECOLOGY

## 2024-05-15 PROCEDURE — 87086 URINE CULTURE/COLONY COUNT: CPT | Performed by: OBSTETRICS & GYNECOLOGY

## 2024-05-15 PROCEDURE — 59025 FETAL NON-STRESS TEST: CPT

## 2024-05-15 NOTE — PATIENT INSTRUCTIONS
TIPS FOR CERVICAL DILATION    SEXUAL INTERCOURSE  Make sure you lie down for a short amount of time afterwards to make sure the semen gets to your cervix.    YOGA/BIRTHING BALL  Bouncing on the ball and rotating hips can be effective for bringing baby down into the pelvis.    EVENING PRIMROSE OIL CAPSULES  In the evening right before going to bed, poke a hole in the end of a capsule and insert it into the vagina just as you would a tampon. There is an oil inside and some may leak out so you may want to wear a pad.   Also take one capsule by mouth each evening.   CURB WALKING  Walk with one foot on the curb and one on the ground. Make sure to turn around and walk back with the other foot to keep the hips even.    Alternatively you can do walking on stairs if no curb is available.  RED RASPBERRY LEAF TEA  2 cups a day if possible (no more than 2 daily)   It will not effect it's efficacy to add sugar, honey, or artificial sweetener if needed.   DATES  Eat 6 [place] dates per day, starting at 36 weeks.  If you do not like the consistency of dates you can try Fig Newtons.   BREAST PUMPING   15 minutes each day, but no more than that  MEMBRANE STRIPPING   Done in the office by a provider  OTHER OPTIONS  Chiropractics  Acupuncture     **Avoid taking mineral/castor oil by itself as seen in old sunil's tales. It is associated with diarrhea and has not been found to be effective.**

## 2024-05-16 VITALS
HEIGHT: 69 IN | BODY MASS INDEX: 31.1 KG/M2 | HEART RATE: 88 BPM | RESPIRATION RATE: 14 BRPM | SYSTOLIC BLOOD PRESSURE: 111 MMHG | DIASTOLIC BLOOD PRESSURE: 60 MMHG | WEIGHT: 210 LBS | OXYGEN SATURATION: 97 % | TEMPERATURE: 98.3 F

## 2024-05-16 PROBLEM — Z34.90 PREGNANCY: Status: ACTIVE | Noted: 2024-05-16

## 2024-05-16 LAB
ABO GROUP BLD: NORMAL
ALBUMIN SERPL-MCNC: 2.9 G/DL (ref 3.5–5.2)
ALBUMIN/GLOB SERPL: 0.9 G/DL
ALP SERPL-CCNC: 170 U/L (ref 39–117)
ALT SERPL W P-5'-P-CCNC: 28 U/L (ref 1–33)
ANION GAP SERPL CALCULATED.3IONS-SCNC: 8.4 MMOL/L (ref 5–15)
AST SERPL-CCNC: 21 U/L (ref 1–32)
BILIRUB SERPL-MCNC: 0.3 MG/DL (ref 0–1.2)
BLD GP AB SCN SERPL QL: NEGATIVE
BUN SERPL-MCNC: 7 MG/DL (ref 6–20)
BUN/CREAT SERPL: 13.2 (ref 7–25)
CALCIUM SPEC-SCNC: 8.9 MG/DL (ref 8.6–10.5)
CHLORIDE SERPL-SCNC: 106 MMOL/L (ref 98–107)
CO2 SERPL-SCNC: 19.6 MMOL/L (ref 22–29)
CREAT SERPL-MCNC: 0.53 MG/DL (ref 0.57–1)
DEPRECATED RDW RBC AUTO: 39.7 FL (ref 37–54)
EGFRCR SERPLBLD CKD-EPI 2021: 135.1 ML/MIN/1.73
ERYTHROCYTE [DISTWIDTH] IN BLOOD BY AUTOMATED COUNT: 18.6 % (ref 12.3–15.4)
GLOBULIN UR ELPH-MCNC: 3.2 GM/DL
GLUCOSE BLDC GLUCOMTR-MCNC: 95 MG/DL (ref 70–130)
GLUCOSE SERPL-MCNC: 83 MG/DL (ref 65–99)
HCT VFR BLD AUTO: 28.4 % (ref 34–46.6)
HGB BLD-MCNC: 9 G/DL (ref 12–15.9)
LDH SERPL-CCNC: 153 U/L (ref 135–214)
MCH RBC QN AUTO: 22.4 PG (ref 26.6–33)
MCHC RBC AUTO-ENTMCNC: 31.7 G/DL (ref 31.5–35.7)
MCV RBC AUTO: 70.8 FL (ref 79–97)
PLATELET # BLD AUTO: 229 10*3/MM3 (ref 140–450)
PMV BLD AUTO: 11 FL (ref 6–12)
POTASSIUM SERPL-SCNC: 3.6 MMOL/L (ref 3.5–5.2)
PROT SERPL-MCNC: 6.1 G/DL (ref 6–8.5)
RBC # BLD AUTO: 4.01 10*6/MM3 (ref 3.77–5.28)
RH BLD: POSITIVE
SODIUM SERPL-SCNC: 134 MMOL/L (ref 136–145)
T PALLIDUM IGG SER QL: NORMAL
T&S EXPIRATION DATE: NORMAL
URATE SERPL-MCNC: 4.8 MG/DL (ref 2.4–5.7)
WBC NRBC COR # BLD AUTO: 7.16 10*3/MM3 (ref 3.4–10.8)

## 2024-05-16 PROCEDURE — 80053 COMPREHEN METABOLIC PANEL: CPT | Performed by: OBSTETRICS & GYNECOLOGY

## 2024-05-16 PROCEDURE — 86901 BLOOD TYPING SEROLOGIC RH(D): CPT | Performed by: OBSTETRICS & GYNECOLOGY

## 2024-05-16 PROCEDURE — 84550 ASSAY OF BLOOD/URIC ACID: CPT | Performed by: OBSTETRICS & GYNECOLOGY

## 2024-05-16 PROCEDURE — 99234 HOSP IP/OBS SM DT SF/LOW 45: CPT | Performed by: OBSTETRICS & GYNECOLOGY

## 2024-05-16 PROCEDURE — G0378 HOSPITAL OBSERVATION PER HR: HCPCS

## 2024-05-16 PROCEDURE — 83615 LACTATE (LD) (LDH) ENZYME: CPT | Performed by: OBSTETRICS & GYNECOLOGY

## 2024-05-16 PROCEDURE — 59025 FETAL NON-STRESS TEST: CPT | Performed by: OBSTETRICS & GYNECOLOGY

## 2024-05-16 PROCEDURE — 85027 COMPLETE CBC AUTOMATED: CPT | Performed by: OBSTETRICS & GYNECOLOGY

## 2024-05-16 PROCEDURE — 59025 FETAL NON-STRESS TEST: CPT

## 2024-05-16 PROCEDURE — 86780 TREPONEMA PALLIDUM: CPT | Performed by: OBSTETRICS & GYNECOLOGY

## 2024-05-16 PROCEDURE — 82948 REAGENT STRIP/BLOOD GLUCOSE: CPT

## 2024-05-16 PROCEDURE — 86900 BLOOD TYPING SEROLOGIC ABO: CPT | Performed by: OBSTETRICS & GYNECOLOGY

## 2024-05-16 PROCEDURE — 63710000001 INSULIN GLARGINE PER 5 UNITS: Performed by: OBSTETRICS & GYNECOLOGY

## 2024-05-16 PROCEDURE — 86850 RBC ANTIBODY SCREEN: CPT | Performed by: OBSTETRICS & GYNECOLOGY

## 2024-05-16 RX ORDER — SODIUM CHLORIDE 0.9 % (FLUSH) 0.9 %
10 SYRINGE (ML) INJECTION EVERY 12 HOURS SCHEDULED
Status: DISCONTINUED | OUTPATIENT
Start: 2024-05-16 | End: 2024-05-16 | Stop reason: HOSPADM

## 2024-05-16 RX ORDER — DOCUSATE SODIUM 100 MG/1
100 CAPSULE, LIQUID FILLED ORAL 2 TIMES DAILY PRN
Status: DISCONTINUED | OUTPATIENT
Start: 2024-05-16 | End: 2024-05-16 | Stop reason: HOSPADM

## 2024-05-16 RX ORDER — ONDANSETRON 2 MG/ML
4 INJECTION INTRAMUSCULAR; INTRAVENOUS EVERY 8 HOURS PRN
Status: DISCONTINUED | OUTPATIENT
Start: 2024-05-16 | End: 2024-05-16 | Stop reason: HOSPADM

## 2024-05-16 RX ORDER — SODIUM CHLORIDE 9 MG/ML
40 INJECTION, SOLUTION INTRAVENOUS AS NEEDED
Status: DISCONTINUED | OUTPATIENT
Start: 2024-05-16 | End: 2024-05-16 | Stop reason: HOSPADM

## 2024-05-16 RX ORDER — ACETAMINOPHEN 325 MG/1
650 TABLET ORAL EVERY 4 HOURS PRN
Status: DISCONTINUED | OUTPATIENT
Start: 2024-05-16 | End: 2024-05-16 | Stop reason: HOSPADM

## 2024-05-16 RX ORDER — PRENATAL VIT/IRON FUM/FOLIC AC 27MG-0.8MG
1 TABLET ORAL DAILY
Status: DISCONTINUED | OUTPATIENT
Start: 2024-05-16 | End: 2024-05-16 | Stop reason: HOSPADM

## 2024-05-16 RX ORDER — LIDOCAINE HYDROCHLORIDE 10 MG/ML
0.5 INJECTION, SOLUTION INFILTRATION; PERINEURAL ONCE AS NEEDED
Status: DISCONTINUED | OUTPATIENT
Start: 2024-05-16 | End: 2024-05-16 | Stop reason: HOSPADM

## 2024-05-16 RX ORDER — BISACODYL 10 MG
10 SUPPOSITORY, RECTAL RECTAL DAILY PRN
Status: DISCONTINUED | OUTPATIENT
Start: 2024-05-16 | End: 2024-05-16 | Stop reason: HOSPADM

## 2024-05-16 RX ORDER — ONDANSETRON 4 MG/1
8 TABLET, ORALLY DISINTEGRATING ORAL EVERY 8 HOURS PRN
Status: DISCONTINUED | OUTPATIENT
Start: 2024-05-16 | End: 2024-05-16 | Stop reason: HOSPADM

## 2024-05-16 RX ORDER — FERROUS SULFATE 325(65) MG
325 TABLET ORAL
Status: DISCONTINUED | OUTPATIENT
Start: 2024-05-16 | End: 2024-05-16 | Stop reason: HOSPADM

## 2024-05-16 RX ORDER — SODIUM CHLORIDE 0.9 % (FLUSH) 0.9 %
10 SYRINGE (ML) INJECTION AS NEEDED
Status: DISCONTINUED | OUTPATIENT
Start: 2024-05-16 | End: 2024-05-16 | Stop reason: HOSPADM

## 2024-05-16 RX ADMIN — FERROUS SULFATE TAB 325 MG (65 MG ELEMENTAL FE) 325 MG: 325 (65 FE) TAB at 09:04

## 2024-05-16 RX ADMIN — Medication 1 TABLET: at 09:01

## 2024-05-16 RX ADMIN — INSULIN GLARGINE 16 UNITS: 100 INJECTION, SOLUTION SUBCUTANEOUS at 07:52

## 2024-05-16 NOTE — OBED NOTES
NICKIE Note OB        Patient Name: Abdon Andres  YOB: 2000  MRN: [id number]  Admission Date: 5/15/2024 10:02 PM  Date of Service: 2024    Chief Complaint: Abdominal Pain (NICKIE - pt started having RUQ pain about an hour ago. Denies visual changes. Does have a mild ARNOLD that she rates her pain a 1/10. +FM. Denies LOF or VB.)        Subjective     Abdno Andres is a 23 y.o. female  at 35w6d with Estimated Date of Delivery: 24 who presents with the chief complaint listed above.  The patient was admitted with a diagnosis of preeclampsia on 24.  When her blood pressures and labs improved, she was discharged home on 24 and was seen in the office earlier today.  She started having mild intermittent RUQ pain about 2100 this evening.  She has a mild headache that she rates 1/10. She denies visual changes, chest pain, shortness of breath.  She sees Dr. Moeller for her prenatal care. Her pregnancy has been complicated by:  preeclampsia without severe features, GDMA2.    She describes fetal movement as normal.  She denies rupture of membranes.  She denies vaginal bleeding. She is not feeling contractions.          Objective   Patient Active Problem List    Diagnosis     *Pregnancy [Z34.90]     Pre-eclampsia in third trimester, without severe features [O14.93]     Insulin controlled gestational diabetes mellitus (GDM) in third trimester [O24.414]         OB History    Para Term  AB Living   1 0 0 0 0 0   SAB IAB Ectopic Molar Multiple Live Births   0 0 0 0 0 0      # Outcome Date GA Lbr Cristino/2nd Weight Sex Type Anes PTL Lv   1 Current                 Past Medical History:   Diagnosis Date    Anxiety     Chlamydia     Gestational diabetes     insulin dependent       Past Surgical History:   Procedure Laterality Date    SPINE SURGERY         No current facility-administered medications on file prior to encounter.     Current Outpatient Medications on File Prior to  Encounter   Medication Sig Dispense Refill    docusate sodium (Colace) 100 MG capsule Take 1 capsule by mouth 2 (Two) Times a Day. 60 capsule 1    ferrous sulfate 325 (65 FE) MG tablet Take 1 tablet by mouth Daily With Breakfast. 30 tablet 2    Insulin Glargine (LANTUS SOLOSTAR) 100 UNIT/ML injection pen Inject 16 units under the skin each AM and 20 units under the skin each evening 15 mL 3    polyethylene glycol (MiraLax) 17 g packet Take 17 g by mouth Daily. 20 each 1    prenatal vitamin (prenatal, CLASSIC, vitamin) tablet Take  by mouth Daily.      Accu-Chek Softclix Lancets lancets USE 1 LANCET TO GET BLOOD SAMPLE FOR BLOOD GLUCOSE TESTING FOUR TIMES DAILY 100 each 1    Blood Glucose Monitoring Suppl (Accu-Chek Guide) w/Device kit USE 1 KIT TO CHECK BLOOD GLUCOSE      docusate sodium (COLACE) 100 MG capsule Take 1 capsule by mouth 2 (Two) Times a Day.      fluticasone (CUTIVATE) 0.005 % ointment APPLY TO AFFECTED AREAS OF THE BODY TWICE DAILY      glucose blood test strip Take blood sugar fasting (AM) and 2 hours after start of breakfast, lunch and dinner 100 each 12    hydrocortisone 2.5 % ointment APPLY TO AFFECTED AREA OF FACE TWICE DAILY      Insulin Pen Needle (BD Pen Needle Melanie 2nd Gen) 32G X 4 MM misc Use 1 each 2 (Two) Times a Day. 120 each 1       Allergies   Allergen Reactions    Other Rash     UTI medication (prescribed by hospital)    UTI medication (prescribed by hospital)   UTI medication (prescribed by hospital)       History reviewed. No pertinent family history.    Social History     Socioeconomic History    Marital status: Single   Tobacco Use    Smoking status: Never     Passive exposure: Never    Smokeless tobacco: Never   Vaping Use    Vaping status: Never Used   Substance and Sexual Activity    Alcohol use: Not Currently    Drug use: Never    Sexual activity: Yes           Review of Systems   Constitutional: Negative.    Eyes:  Negative for visual disturbance.   Respiratory: Negative.   "Negative for shortness of breath.    Cardiovascular: Negative.  Negative for chest pain.   Gastrointestinal:  Positive for abdominal pain. Negative for nausea and vomiting.   Genitourinary: Negative.    Musculoskeletal: Negative.    Neurological:  Positive for headaches.       PHYSICAL EXAM:      VITAL SIGNS:  Vitals:    05/15/24 2246 05/15/24 2250 05/15/24 2300 05/15/24 2359   BP: 132/74  130/75 129/77   Pulse: 87 80 90 91   Temp:       TempSrc:       SpO2:  99% 99% 99%   Weight:    95.3 kg (210 lb)   Height:    174 cm (68.5\")        FHT:                   Baseline:  120 BPM  Variability:  Moderate = 6 - 25 BPM  Accelerations:  15 x 15 accelerations present     Decelerations:  absent  Contractions:   absent     Interpretation:    Reactive NST, CAT 1 tracing        PHYSICAL EXAM:    General: well developed; well nourished  no acute distress   Heart: regular rate and rhythm, S1, S2 normal, no murmur, click, rub or gallop   Lungs  : breathing is unlabored  clear to auscultation bilaterally     Abdomen: soft, non-tender; no masses       Cervix: was not checked.      Contractions: none        Extremities: trace LE edema      LABS AND TESTING ORDERED:  Uterine and fetal monitoring  Urinalysis  CBC, CMP, urine protein/creatinine ratio    LAB RESULTS:    Recent Results (from the past 24 hour(s))   POC Urinalysis Dipstick    Collection Time: 05/15/24  2:22 PM    Specimen: Urine   Result Value Ref Range    Glucose, UA Negative Negative mg/dL    Protein, POC Trace (A) Negative mg/dL    Leukocytes Trace (A) Negative   Urinalysis With Culture If Indicated - Urine, Clean Catch    Collection Time: 05/15/24 10:19 PM    Specimen: Urine, Clean Catch   Result Value Ref Range    Color, UA Yellow Yellow, Straw    Appearance, UA Cloudy (A) Clear    pH, UA 7.0 5.0 - 8.0    Specific Gravity, UA 1.009 1.005 - 1.030    Glucose, UA Negative Negative    Ketones, UA Negative Negative    Bilirubin, UA Negative Negative    Blood, UA Negative " Negative    Protein, UA Negative Negative    Leuk Esterase, UA Trace (A) Negative    Nitrite, UA Negative Negative    Urobilinogen, UA 1.0 E.U./dL 0.2 - 1.0 E.U./dL   Comprehensive Metabolic Panel    Collection Time: 05/15/24 10:19 PM    Specimen: Blood   Result Value Ref Range    Glucose 118 (H) 65 - 99 mg/dL    BUN 8 6 - 20 mg/dL    Creatinine 0.60 0.57 - 1.00 mg/dL    Sodium 137 136 - 145 mmol/L    Potassium 3.9 3.5 - 5.2 mmol/L    Chloride 105 98 - 107 mmol/L    CO2 20.0 (L) 22.0 - 29.0 mmol/L    Calcium 9.6 8.6 - 10.5 mg/dL    Total Protein 6.8 6.0 - 8.5 g/dL    Albumin 3.6 3.5 - 5.2 g/dL    ALT (SGPT) 34 (H) 1 - 33 U/L    AST (SGOT) 24 1 - 32 U/L    Alkaline Phosphatase 188 (H) 39 - 117 U/L    Total Bilirubin 0.2 0.0 - 1.2 mg/dL    Globulin 3.2 gm/dL    A/G Ratio 1.1 g/dL    BUN/Creatinine Ratio 13.3 7.0 - 25.0    Anion Gap 12.0 5.0 - 15.0 mmol/L    eGFR 131.2 >60.0 mL/min/1.73   Protein / Creatinine Ratio, Urine - Urine, Clean Catch    Collection Time: 05/15/24 10:19 PM    Specimen: Urine, Clean Catch   Result Value Ref Range    Protein/Creatinine Ratio, Urine 121.1 0.0 - 200.0 mg/G Crea    Creatinine, Urine 38.0 mg/dL    Total Protein, Urine 4.6 mg/dL   CBC Auto Differential    Collection Time: 05/15/24 10:19 PM    Specimen: Blood   Result Value Ref Range    WBC 8.88 3.40 - 10.80 10*3/mm3    RBC 4.28 3.77 - 5.28 10*6/mm3    Hemoglobin 9.6 (L) 12.0 - 15.9 g/dL    Hematocrit 30.2 (L) 34.0 - 46.6 %    MCV 70.6 (L) 79.0 - 97.0 fL    MCH 22.4 (L) 26.6 - 33.0 pg    MCHC 31.8 31.5 - 35.7 g/dL    RDW 18.4 (H) 12.3 - 15.4 %    RDW-SD 39.5 37.0 - 54.0 fl    MPV 10.2 6.0 - 12.0 fL    Platelets 266 140 - 450 10*3/mm3    Neutrophil % 78.7 (H) 42.7 - 76.0 %    Lymphocyte % 11.5 (L) 19.6 - 45.3 %    Monocyte % 7.2 5.0 - 12.0 %    Eosinophil % 1.0 0.3 - 6.2 %    Basophil % 0.2 0.0 - 1.5 %    Immature Grans % 1.4 (H) 0.0 - 0.5 %    Neutrophils, Absolute 6.99 1.70 - 7.00 10*3/mm3    Lymphocytes, Absolute 1.02 0.70 - 3.10  "10*3/mm3    Monocytes, Absolute 0.64 0.10 - 0.90 10*3/mm3    Eosinophils, Absolute 0.09 0.00 - 0.40 10*3/mm3    Basophils, Absolute 0.02 0.00 - 0.20 10*3/mm3    Immature Grans, Absolute 0.12 (H) 0.00 - 0.05 10*3/mm3   Urinalysis, Microscopic Only - Urine, Clean Catch    Collection Time: 05/15/24 10:19 PM    Specimen: Urine, Clean Catch   Result Value Ref Range    RBC, UA 0-2 None Seen, 0-2 /HPF    WBC, UA 6-10 (A) None Seen, 0-2 /HPF    Bacteria, UA 1+ (A) None Seen /HPF    Squamous Epithelial Cells, UA 13-20 (A) None Seen, 0-2 /HPF    Hyaline Casts, UA 0-2 None Seen /LPF    Methodology Automated Microscopy        Lab Results   Component Value Date    ABO B 2024    RH Positive 2024       No results found for: \"STREPGPB\"              Assessment & Plan     ASSESSMENT/PLAN:  Abdon Andres is a 23 y.o. female  at 35w6d who presented with: RUQ pain          Final Impression:  Pregnancy at 35w6d  Reactive NST.  CAT 1 tracing  Intermittent RUQ pain.  Preeclampsia   Maternal vital signs were reviewed and were unremarkable              Vitals:    05/15/24 2246 05/15/24 2250 05/15/24 2300 05/15/24 2359   BP: 132/74  130/75 129/77   Pulse: 87 80 90 91   Temp:       TempSrc:       SpO2:  99% 99% 99%   Weight:    95.3 kg (210 lb)   Height:    174 cm (68.5\")       No results found for: \"STREPGPB\"  Lab Results   Component Value Date    ABO B 2024    RH Positive 2024         PLAN:     Patient will be admitted for observation  Serial blood pressures  Repeat preeclampsia labs in the morning    Plan discussed with patient and covering physician.    I have spent 30 minutes including face to face time with the patient, greater than 50% in discussion of the diagnosis (counseling) and/or coordination of care.     Abbi Best MD  2024  00:36 EDT  OB Hospitalist  Phone:  [phone]  "

## 2024-05-16 NOTE — PLAN OF CARE
Goal Outcome Evaluation:  Plan of Care Reviewed With: patient        Progress: improving  Outcome Evaluation: pt states she is no longer experiencing any pain and desires to be dcd home today, md aware. pt in stable condition and ready for dc home when orders placed

## 2024-05-16 NOTE — NON STRESS TEST
Abdon Andres, samantha  at 36w0d with an VANE of 2024, by Last Menstrual Period, was seen at [address] for a nonstress test.    Chief Complaint   Patient presents with    Abdominal Pain     NICKIE - pt started having RUQ pain about an hour ago. Denies visual changes. Does have a mild ARNOLD that she rates her pain a 1/10. +FM. Denies LOF or VB.       Patient Active Problem List   Diagnosis    Pre-eclampsia in third trimester, without severe features    Insulin controlled gestational diabetes mellitus (GDM) in third trimester    Pregnancy       Start Time: 907  Stop Time: 928    Interpretation A  Nonstress Test Interpretation A: Reactive

## 2024-05-16 NOTE — H&P
[facility]  Obstetric History and Physical    Chief Complaint   Patient presents with    Abdominal Pain     NICKIE - pt started having RUQ pain about an hour ago. Denies visual changes. Does have a mild ARNOLD that she rates her pain a 1/10. +FM. Denies LOF or VB.       Subjective     Patient is a 23 y.o. female  currently at 36w0d, who presents with abdominal pain; she had previously been admitted for a preeclampsia work up on 24.  This morning her abdominal pain has resolved. She denies HA/vision changes.  She was observed overnight due to a slight elevation in her transaminases    This pregnancy has been complicated by GDMA2, preeclampsia without severe features.        Prenatal Information:  Prenatal Results       Initial Prenatal Labs       Test Value Reference Range Date Time    Hemoglobin  12.3 g/dL 11.1 - 15.9 10/30/23 1109      ^ 12.3 g/dL 12.0 - 16.0 23 1714    Hematocrit  37.4 % 34.0 - 46.6 10/30/23 1109      ^ 37.9 % 36.0 - 46.0 23 1714    Platelets  290 x10E3/uL 150 - 450 10/30/23 1109      ^ 260 10*3/uL 140 - 440 23 1714    Rubella IgG  6.48 index Immune >0.99 10/30/23 1109    Hepatitis B SAg  Negative  Negative 10/30/23 1109    Hepatitis C Ab  Non Reactive  Non Reactive 10/30/23 1109    RPR  Non Reactive  Non Reactive 10/30/23 1109    T. Pallidum Ab   Non-Reactive  Non-Reactive 24 0027       Non-Reactive  Non-Reactive 05/10/24 0832    ABO  B   24 0027    Rh  Positive   24 0027    Antibody Screen  Negative  Negative 10/30/23 1109    HIV  Non Reactive  Non Reactive 10/30/23 1109    Urine Culture  No growth   05/15/24 2219       50,000 CFU/mL Mixed Janell Isolated   24 1525       No growth   24 1416       No growth   24 2249       No growth   24       Final report   10/30/23 1521    Gonorrhea  Negative  Negative 04/10/24 1642       Negative  Negative 10/30/23 1508    Chlamydia  Negative  Negative 04/10/24 1642       Negative   Negative 10/30/23 1508    TSH        HgB A1c         Varicella IgG        HgB Electrophoresis         Cystic fibrosis                   Fetal testing        Test Value Reference Range Date Time    NIPT        MSAFP  *Screen Negative*   01/15/24 1225    AFP-4                  2nd and 3rd Trimester       Test Value Reference Range Date Time    Hemoglobin (repeated)  9.0 g/dL 12.0 - 15.9 05/16/24 0627       9.6 g/dL 12.0 - 15.9 05/15/24 2219       8.7 g/dL 12.0 - 15.9 05/13/24 0635       9.0 g/dL 12.0 - 15.9 05/12/24 2142       8.4 g/dL 12.0 - 15.9 05/12/24 0511       8.7 g/dL 12.0 - 15.9 05/11/24 0759       9.2 g/dL 12.0 - 15.9 05/10/24 0832       9.1 g/dL 12.0 - 15.9 05/09/24 1525       9.0 g/dL 12.0 - 15.9 04/18/24 1259       9.2 g/dL 12.0 - 15.9 03/19/24 1445       9.8 g/dL 12.0 - 15.9 03/06/24 1407    Hematocrit (repeated)  28.4 % 34.0 - 46.6 05/16/24 0627       30.2 % 34.0 - 46.6 05/15/24 2219       28.0 % 34.0 - 46.6 05/13/24 0635       29.2 % 34.0 - 46.6 05/12/24 2142       27.1 % 34.0 - 46.6 05/12/24 0511       27.7 % 34.0 - 46.6 05/11/24 0759       29.4 % 34.0 - 46.6 05/10/24 0832       29.1 % 34.0 - 46.6 05/09/24 1525       28.0 % 34.0 - 46.6 04/18/24 1259       28.7 % 34.0 - 46.6 03/19/24 1445       30.4 % 34.0 - 46.6 03/06/24 1407    Platelets   229 10*3/mm3 140 - 450 05/16/24 0627       266 10*3/mm3 140 - 450 05/15/24 2219       253 10*3/mm3 140 - 450 05/13/24 0635       269 10*3/mm3 140 - 450 05/12/24 2142       263 10*3/mm3 140 - 450 05/12/24 0511       274 10*3/mm3 140 - 450 05/11/24 0759       273 10*3/mm3 140 - 450 05/10/24 0832       283 10*3/mm3 140 - 450 05/09/24 1525       258 10*3/mm3 140 - 450 04/18/24 1259       280 10*3/mm3 140 - 450 03/19/24 1445       300 10*3/mm3 140 - 450 03/06/24 1407       290 x10E3/uL 150 - 450 10/30/23 1109      ^ 260 10*3/uL 140 - 440 09/24/23 1714    GCT  149 mg/dL 65 - 139 03/06/24 1407    Antibody Screen (repeated)  Negative   05/16/24 0027       Negative    05/12/24 2142       Negative   05/10/24 0832       Negative  Negative 03/06/24 1407    3rd TM syphilis scrn (repeated)  RPR         3rd TM syphilis scrn (repeated) FTA        GTT Fasting  107 mg/dL 65 - 94 03/11/24 0844    GTT 1 Hr  222 mg/dL 65 - 179 03/11/24 0844    GTT 2 Hr  171 mg/dL 65 - 154 03/11/24 0844    GTT 3 Hr  174 mg/dL 65 - 139 03/11/24 0844    Group B Strep                  Other testing        Test Value Reference Range Date Time    Parvo IgG         CMV IgG                   Drug Screening       Test Value Reference Range Date Time    Amphetamine Screen  Negative ng/mL Qtsqap=0767 10/30/23 1521    Barbiturate Screen  Negative ng/mL Axvpfq=650 10/30/23 1521    Benzodiazepine Screen  Negative ng/mL Kwprdp=741 10/30/23 1521    Methadone Screen  Negative ng/mL Qskjrb=401 10/30/23 1521    Phencyclidine Screen  Negative ng/mL Cutoff=25 10/30/23 1521    Opiates Screen  Negative ng/mL Ypumem=456 10/30/23 1521    THC Screen  Negative ng/mL Cutoff=50 10/30/23 1521    Cocaine Screen  Negative ng/mL Cxuyws=992 10/30/23 1521    Propoxyphene Screen  Negative ng/mL Fvbbfo=843 10/30/23 1521    Buprenorphine Screen        Methamphetamine Screen        Oxycodone Screen        Tricyclic Antidepressants Screen                  Legend    ^: Historical                          External Prenatal Results       Pregnancy Outside Results - Transcribed From Office Records - See Scanned Records For Details       Test Value Date Time    ABO  B  05/16/24 0027    Rh  Positive  05/16/24 0027    Antibody Screen  Negative  05/16/24 0027       Negative  05/12/24 2142       Negative  05/10/24 0832       Negative  03/06/24 1407       Negative  10/30/23 1109    Varicella IgG       Rubella  6.48 index 10/30/23 1109    Hgb  9.0 g/dL 05/16/24 0627       9.6 g/dL 05/15/24 2219       8.7 g/dL 05/13/24 0635       9.0 g/dL 05/12/24 2142       8.4 g/dL 05/12/24 0511       8.7 g/dL 05/11/24 0759       9.2 g/dL 05/10/24 0832       9.1 g/dL  05/09/24 1525       9.0 g/dL 04/18/24 1259       9.2 g/dL 03/19/24 1445       9.8 g/dL 03/06/24 1407       12.3 g/dL 10/30/23 1109      ^ 12.3 g/dL 09/24/23 1714    Hct  28.4 % 05/16/24 0627       30.2 % 05/15/24 2219       28.0 % 05/13/24 0635       29.2 % 05/12/24 2142       27.1 % 05/12/24 0511       27.7 % 05/11/24 0759       29.4 % 05/10/24 0832       29.1 % 05/09/24 1525       28.0 % 04/18/24 1259       28.7 % 03/19/24 1445       30.4 % 03/06/24 1407       37.4 % 10/30/23 1109      ^ 37.9 % 09/24/23 1714    Glucose Fasting GTT  107 mg/dL 03/11/24 0844    Glucose Tolerance Test 1 hour  222 mg/dL 03/11/24 0844    Glucose Tolerance Test 3 hour  174 mg/dL 03/11/24 0844    Gonorrhea (discrete)  Negative  04/10/24 1642       Negative  10/30/23 1508    Chlamydia (discrete)  Negative  04/10/24 1642       Negative  10/30/23 1508    RPR  Non Reactive  10/30/23 1109    VDRL       Syphilis Antibody       HBsAg  Negative  10/30/23 1109    Herpes Simplex Virus PCR       Herpes Simplex VIrus Culture       HIV  Non Reactive  10/30/23 1109    Hep C RNA Quant PCR       Hep C Antibody  Non Reactive  10/30/23 1109    AFP  59.5 ng/mL 01/15/24 1225    Group B Strep       GBS Susceptibility to Clindamycin       GBS Susceptibility to Erythromycin       Fetal Fibronectin  Negative  05/09/24 1529    Genetic Testing, Maternal Blood                 Drug Screening       Test Value Date Time    NIPT        Urine Drug Screen       Amphetamine Screen  Negative ng/mL 10/30/23 1521    Barbiturate Screen  Negative ng/mL 10/30/23 1521    Benzodiazepine Screen  Negative ng/mL 10/30/23 1521    Methadone Screen  Negative ng/mL 10/30/23 1521    Phencyclidine Screen  Negative ng/mL 10/30/23 1521    Opiates Screen       THC Screen       Cocaine Screen       Propoxyphene Screen  Negative ng/mL 10/30/23 1521    Buprenorphine Screen       Methamphetamine Screen       Oxycodone Screen                 Legend    ^: Historical                              Past OB History:     OB History    Para Term  AB Living   1 0 0 0 0 0   SAB IAB Ectopic Molar Multiple Live Births   0 0 0 0 0 0      # Outcome Date GA Lbr Cristino/2nd Weight Sex Type Anes PTL Lv   1 Current                Past Medical History: Past Medical History:   Diagnosis Date    Anxiety     Chlamydia     Gestational diabetes     insulin dependent    History of back surgery 2019    Fell out of window and broke back      Past Surgical History Past Surgical History:   Procedure Laterality Date    SPINE SURGERY        Family History: History reviewed. No pertinent family history.   Social History:  reports that she has never smoked. She has never been exposed to tobacco smoke. She has never used smokeless tobacco.   reports no history of alcohol use.   reports no history of drug use.         Review of Systems - Negative except per HPI for 10 point review of systems including General, Psychological, Ophthalmic, ENT, Endocrine, Respiratory, Cardiovascular, Gastrointestinal, Genito-Urinary, Musculoskeletal, Neurological, Dermatological      Objective       Vital Signs Range for the last 24 hours  Temperature: Temp:  [97.3 °F (36.3 °C)-98.3 °F (36.8 °C)] 98.3 °F (36.8 °C)   Temp Source: Temp src: Temporal   BP: BP: (111-136)/(60-80) 111/60   Pulse: Heart Rate:  [] 88   Respirations: Resp:  [14-17] 14   SPO2: SpO2:  [97 %-100 %] 97 %   O2 Amount (l/min):     O2 Devices Device (Oxygen Therapy): room air   Weight: Weight:  [95.3 kg (210 lb)] 95.3 kg (210 lb)     Physical Examination: General appearance - alert, well appearing, and in no distress  Mental status - alert, oriented to person, place, and time  Eyes - sclera anicteric, left eye normal, right eye normal  Chest - no tachypnea, retractions or cyanosis  Heart - normal rate and regular rhythm  Abdomen - soft, nontender, gravid  Pelvic - deferred  Back exam - full range of motion, no tenderness, palpable spasm or pain on motion   Neurological  - alert, oriented, normal speech, no focal findings or movement disorder noted   Musculoskeletal - no joint tenderness, deformity or swelling  Extremities - pedal edema trace  Skin - normal coloration and turgor, no rashes, no suspicious skin lesions noted        Cervix: Exam by:     Dilation:     Effacement:     Station:         Fetal Heart Rate Assessment   Method: Fetal HR Assessment Method: external   Beats/min: Fetal HR (beats/min): 140   Baseline: Fetal HR Baseline: normal range   Varibility: Fetal HR Variability: moderate (amplitude range 6 to 25 bpm)   Accels: Fetal HR Accelerations: lasting at least 15 seconds, greater than/equal to 15 bpm   Decels: Fetal HR Decelerations: absent   Tracing Category:       Uterine Assessment   Method: Method: external tocotransducer   Frequency (min): Contraction Frequency (Minutes): X1   Ctx Count in 10 min:     Duration:     Intensity: Contraction Intensity: no contractions   Intensity by IUPC:     Resting Tone: Uterine Resting Tone: soft by palpation   Resting Tone by IUPC:     Sanford Units:       Lab Results   Component Value Date    WBC 7.16 05/16/2024    HGB 9.0 (L) 05/16/2024    HCT 28.4 (L) 05/16/2024    MCV 70.8 (L) 05/16/2024     05/16/2024      Lab Results   Component Value Date    GLUCOSE 83 05/16/2024    BUN 7 05/16/2024    CREATININE 0.53 (L) 05/16/2024    EGFRRESULT 132 04/18/2024    EGFR 135.1 05/16/2024    BCR 13.2 05/16/2024    K 3.6 05/16/2024    CO2 19.6 (L) 05/16/2024    CALCIUM 8.9 05/16/2024    PROTENTOTREF 6.9 04/18/2024    ALBUMIN 2.9 (L) 05/16/2024    BILITOT 0.3 05/16/2024    AST 21 05/16/2024    ALT 28 05/16/2024         US: see report    Assessment & Plan   Assessment:  1.  Intrauterine pregnancy at 36w0d weeks gestation with reactive, reassuring fetal status.    2.  Abdominal pain resolved, normal transaminases this AM  3. Preeclampsia without severe features  4. GDMA 2    Plan:  1. Patient would like to go home. Her BP is within  normal limits and her labs this morning show normal transaminases. She has her insulin to administer at home as well as glucose monitoring supplies. Fetal status is reassuring and she has a BPP scheduled for tomorrow.  She is aware of return precautions, preeclampsia precautions.  2. Plan of care has been reviewed with patient and her boyfriend (who she requested to call while we talked).  Risks, benefits of treatment plan have been discussed.  All questions have been answered.      Emily Delgado MD  5/16/2024  11:36 EDT

## 2024-05-16 NOTE — PLAN OF CARE
Problem: Adult Inpatient Plan of Care  Goal: Plan of Care Review  Outcome: Ongoing, Progressing  Goal: Patient-Specific Goal (Individualized)  Outcome: Ongoing, Progressing  Goal: Absence of Hospital-Acquired Illness or Injury  Outcome: Ongoing, Progressing  Intervention: Identify and Manage Fall Risk  Intervention: Prevent and Manage VTE (Venous Thromboembolism) Risk  Goal: Optimal Comfort and Wellbeing  Outcome: Ongoing, Progressing  Intervention: Provide Person-Centered Care  Goal: Readiness for Transition of Care  Outcome: Ongoing, Progressing     Problem: Maternal-Fetal Wellbeing  Goal: Optimal Maternal-Fetal Wellbeing  Outcome: Ongoing, Progressing  Intervention: Support Psychosocial Response to Complications During Pregnancy     Problem: Hypertensive Disorders in Pregnancy  Goal: Maternal-Fetal Stabilization  Outcome: Ongoing, Progressing  Intervention: Monitor and Manage Symptom Progression     Problem: Diabetes in Pregnancy  Goal: Blood Glucose Level Within Targeted Range  Outcome: Ongoing, Progressing   Goal Outcome Evaluation:  Plan of Care Reviewed With: patient, significant other        Progress: no change  Outcome Evaluation: VSS. Denies ha/epigastric pain/visual changes/lof/ctx/vaginal bleeding.  Pain pt experienced for admission resolved. Pt rested throughout night.

## 2024-05-17 LAB — BACTERIA SPEC AEROBE CULT: NORMAL

## 2024-05-18 ENCOUNTER — HOSPITAL ENCOUNTER (EMERGENCY)
Facility: HOSPITAL | Age: 24
Discharge: HOME | End: 2024-05-18
Attending: OBSTETRICS & GYNECOLOGY | Admitting: OBSTETRICS & GYNECOLOGY
Payer: COMMERCIAL

## 2024-05-18 ENCOUNTER — TELEPHONE (OUTPATIENT)
Dept: OBSTETRICS AND GYNECOLOGY | Facility: CLINIC | Age: 24
End: 2024-05-18
Payer: COMMERCIAL

## 2024-05-18 VITALS
OXYGEN SATURATION: 99 % | DIASTOLIC BLOOD PRESSURE: 66 MMHG | HEART RATE: 87 BPM | SYSTOLIC BLOOD PRESSURE: 125 MMHG | WEIGHT: 211.6 LBS | BODY MASS INDEX: 31.71 KG/M2 | RESPIRATION RATE: 16 BRPM

## 2024-05-18 LAB
ALBUMIN SERPL-MCNC: 3.2 G/DL (ref 3.5–5.2)
ALBUMIN/GLOB SERPL: 1 G/DL
ALP SERPL-CCNC: 184 U/L (ref 39–117)
ALT SERPL W P-5'-P-CCNC: 33 U/L (ref 1–33)
ANION GAP SERPL CALCULATED.3IONS-SCNC: 11.8 MMOL/L (ref 5–15)
AST SERPL-CCNC: 23 U/L (ref 1–32)
BACTERIA UR QL AUTO: ABNORMAL /HPF
BASOPHILS # BLD AUTO: 0.02 10*3/MM3 (ref 0–0.2)
BASOPHILS NFR BLD AUTO: 0.2 % (ref 0–1.5)
BILIRUB SERPL-MCNC: <0.2 MG/DL (ref 0–1.2)
BILIRUB UR QL STRIP: NEGATIVE
BUN SERPL-MCNC: 6 MG/DL (ref 6–20)
BUN/CREAT SERPL: 12 (ref 7–25)
CALCIUM SPEC-SCNC: 8.7 MG/DL (ref 8.6–10.5)
CHLORIDE SERPL-SCNC: 104 MMOL/L (ref 98–107)
CLARITY UR: CLEAR
CO2 SERPL-SCNC: 21.2 MMOL/L (ref 22–29)
COLOR UR: YELLOW
CREAT SERPL-MCNC: 0.5 MG/DL (ref 0.57–1)
CREAT UR-MCNC: 59.2 MG/DL
DEPRECATED RDW RBC AUTO: 43.2 FL (ref 37–54)
EGFRCR SERPLBLD CKD-EPI 2021: 137 ML/MIN/1.73
EOSINOPHIL # BLD AUTO: 0.14 10*3/MM3 (ref 0–0.4)
EOSINOPHIL NFR BLD AUTO: 1.7 % (ref 0.3–6.2)
ERYTHROCYTE [DISTWIDTH] IN BLOOD BY AUTOMATED COUNT: 20.9 % (ref 12.3–15.4)
GLOBULIN UR ELPH-MCNC: 3.1 GM/DL
GLUCOSE SERPL-MCNC: 105 MG/DL (ref 65–99)
GLUCOSE UR STRIP-MCNC: NEGATIVE MG/DL
HCT VFR BLD AUTO: 30.3 % (ref 34–46.6)
HGB BLD-MCNC: 9.4 G/DL (ref 12–15.9)
HGB UR QL STRIP.AUTO: NEGATIVE
HYALINE CASTS UR QL AUTO: ABNORMAL /LPF
IMM GRANULOCYTES # BLD AUTO: 0.11 10*3/MM3 (ref 0–0.05)
IMM GRANULOCYTES NFR BLD AUTO: 1.4 % (ref 0–0.5)
KETONES UR QL STRIP: NEGATIVE
LEUKOCYTE ESTERASE UR QL STRIP.AUTO: ABNORMAL
LYMPHOCYTES # BLD AUTO: 1 10*3/MM3 (ref 0.7–3.1)
LYMPHOCYTES NFR BLD AUTO: 12.4 % (ref 19.6–45.3)
MCH RBC QN AUTO: 22.8 PG (ref 26.6–33)
MCHC RBC AUTO-ENTMCNC: 31 G/DL (ref 31.5–35.7)
MCV RBC AUTO: 73.4 FL (ref 79–97)
MONOCYTES # BLD AUTO: 0.53 10*3/MM3 (ref 0.1–0.9)
MONOCYTES NFR BLD AUTO: 6.6 % (ref 5–12)
NEUTROPHILS NFR BLD AUTO: 6.27 10*3/MM3 (ref 1.7–7)
NEUTROPHILS NFR BLD AUTO: 77.7 % (ref 42.7–76)
NITRITE UR QL STRIP: NEGATIVE
NRBC BLD AUTO-RTO: 0 /100 WBC (ref 0–0.2)
PH UR STRIP.AUTO: 7 [PH] (ref 5–8)
PLATELET # BLD AUTO: 227 10*3/MM3 (ref 140–450)
PMV BLD AUTO: 10.4 FL (ref 6–12)
POTASSIUM SERPL-SCNC: 3.6 MMOL/L (ref 3.5–5.2)
PROT ?TM UR-MCNC: 9.3 MG/DL
PROT SERPL-MCNC: 6.3 G/DL (ref 6–8.5)
PROT UR QL STRIP: NEGATIVE
PROT/CREAT UR: 157.1 MG/G CREA (ref 0–200)
RBC # BLD AUTO: 4.13 10*6/MM3 (ref 3.77–5.28)
RBC # UR STRIP: ABNORMAL /HPF
REF LAB TEST METHOD: ABNORMAL
SODIUM SERPL-SCNC: 137 MMOL/L (ref 136–145)
SP GR UR STRIP: 1.01 (ref 1–1.03)
SQUAMOUS #/AREA URNS HPF: ABNORMAL /HPF
UROBILINOGEN UR QL STRIP: ABNORMAL
WBC # UR STRIP: ABNORMAL /HPF
WBC NRBC COR # BLD AUTO: 8.07 10*3/MM3 (ref 3.4–10.8)

## 2024-05-18 PROCEDURE — 84156 ASSAY OF PROTEIN URINE: CPT | Performed by: OBSTETRICS & GYNECOLOGY

## 2024-05-18 PROCEDURE — 82570 ASSAY OF URINE CREATININE: CPT | Performed by: OBSTETRICS & GYNECOLOGY

## 2024-05-18 PROCEDURE — 59025 FETAL NON-STRESS TEST: CPT

## 2024-05-18 PROCEDURE — 85025 COMPLETE CBC W/AUTO DIFF WBC: CPT | Performed by: OBSTETRICS & GYNECOLOGY

## 2024-05-18 PROCEDURE — 81001 URINALYSIS AUTO W/SCOPE: CPT | Performed by: OBSTETRICS & GYNECOLOGY

## 2024-05-18 PROCEDURE — 87086 URINE CULTURE/COLONY COUNT: CPT | Performed by: OBSTETRICS & GYNECOLOGY

## 2024-05-18 PROCEDURE — 80053 COMPREHEN METABOLIC PANEL: CPT | Performed by: OBSTETRICS & GYNECOLOGY

## 2024-05-18 PROCEDURE — 99284 EMERGENCY DEPT VISIT MOD MDM: CPT | Performed by: OBSTETRICS & GYNECOLOGY

## 2024-05-18 PROCEDURE — 63710000001 ONDANSETRON ODT 4 MG TABLET DISPERSIBLE: Performed by: OBSTETRICS & GYNECOLOGY

## 2024-05-18 RX ORDER — ONDANSETRON 4 MG/1
4 TABLET, ORALLY DISINTEGRATING ORAL EVERY 6 HOURS PRN
Status: DISCONTINUED | OUTPATIENT
Start: 2024-05-18 | End: 2024-05-19 | Stop reason: HOSPADM

## 2024-05-18 RX ORDER — ACETAMINOPHEN 500 MG
1000 TABLET ORAL EVERY 6 HOURS PRN
COMMUNITY

## 2024-05-18 RX ORDER — ONDANSETRON 4 MG/1
4 TABLET, FILM COATED ORAL EVERY 8 HOURS PRN
Qty: 10 TABLET | Refills: 0 | Status: SHIPPED | OUTPATIENT
Start: 2024-05-18

## 2024-05-18 RX ADMIN — ONDANSETRON 4 MG: 4 TABLET, ORALLY DISINTEGRATING ORAL at 23:38

## 2024-05-18 NOTE — TELEPHONE ENCOUNTER
OB answering service    23 y.o.  @ 36w2d  Pre-eclampsia -   144/78, repeat 139/70's    BP parameters reviewed.     Walter Cho MD  2024  17:55 EDT

## 2024-05-19 NOTE — PROGRESS NOTES
[unfilled]  Antepartum Note      2024    Name:Abdon Andres    MR#:[id number]    Progress note                        HD:0    Subjective     Chief Complaint   Patient presents with    Headache     To NICKIE with c/o headache unrelieved by tylenol and elevated BP per home monitor device       23 y.o. yo Female  at 36w2d presents c/o nausea with headache and elevated blood pressure at home. She admits to + fetal movement.  She states she has been diagnosed with pre eclampsia. She has had problems with nausea and does not have any medications at home. She took some tylenol earlier but still has headache.  Her pregnancy has been complicated by Class A2 diabetes and pre eclampsia.    Patient Active Problem List   Diagnosis    Pre-eclampsia in third trimester, without severe features    Insulin controlled gestational diabetes mellitus (GDM) in third trimester    Pregnancy        Review of system  Review of Systems   Constitutional: Negative.    Eyes: Negative.    Respiratory: Negative.     Cardiovascular: Negative.    Gastrointestinal:  Positive for nausea.   Endocrine: Negative.    Genitourinary: Negative.    Musculoskeletal: Negative.    Skin: Negative.    Allergic/Immunologic: Negative.    Neurological:  Positive for headaches.   Hematological: Negative.    Psychiatric/Behavioral: Negative.         Objective    Vitals  Temp:        BP:  BP: (125-132)/(66-69) 125/66  Pulse:  Heart Rate:  [67-97] 87  RR:   Resp:  [16] 16    Wt Readings from Last 3 Encounters:   24 96 kg (211 lb 9.6 oz)   05/15/24 95.3 kg (210 lb)   05/15/24 95.3 kg (210 lb)       Body mass index is 31.71 kg/m².    Exam:    Physical Exam  Constitutional:       Appearance: Normal appearance.   Cardiovascular:      Rate and Rhythm: Normal rate and regular rhythm.   Pulmonary:      Effort: Pulmonary effort is normal.      Breath sounds: Normal breath sounds.   Abdominal:      General: Bowel sounds are normal.      Palpations: Abdomen is  soft.      Tenderness: There is no abdominal tenderness.   Genitourinary:     Comments: deferred  Musculoskeletal:         General: Normal range of motion.      Right lower leg: No edema.      Left lower leg: No edema.   Skin:     General: Skin is warm and dry.   Neurological:      General: No focal deficit present.      Mental Status: She is alert and oriented to person, place, and time.   Psychiatric:         Mood and Affect: Mood normal.         Behavior: Behavior normal.         No intake/output data recorded.    Results from last 7 days   Lab Units 05/18/24 2304 05/16/24  0627 05/15/24  2219   WBC 10*3/mm3 8.07 7.16 8.88   HEMOGLOBIN g/dL 9.4* 9.0* 9.6*   HEMATOCRIT % 30.3* 28.4* 30.2*   PLATELETS 10*3/mm3 227 229 266     Results from last 7 days   Lab Units 05/18/24  2304 05/16/24  0627 05/15/24  2219   SODIUM mmol/L 137 134* 137   POTASSIUM mmol/L 3.6 3.6 3.9   CHLORIDE mmol/L 104 106 105   CO2 mmol/L 21.2* 19.6* 20.0*   BUN mg/dL 6 7 8   CREATININE mg/dL 0.50* 0.53* 0.60   CALCIUM mg/dL 8.7 8.9 9.6   BILIRUBIN mg/dL <0.2 0.3 0.2   ALK PHOS U/L 184* 170* 188*   ALT (SGPT) U/L 33 28 34*   AST (SGOT) U/L 23 21 24   GLUCOSE mg/dL 105* 83 118*       Fetal Tracing:  Reactive, Category I , baseline 130's moderate variability + accelerations no decelerations +irregular contractions Cat 1 tracing        Assessment/Plan  Intrauterine pregnancy at 36w2d    * No active hospital problems. *  Mild Pre eclampsia- labs stable  False Labor  Nausea and Vomiting -Zofran now  Will dc to home with rx zofran  Keep f/u OB appt  Kick counts bid  PIH precautions        Sofi Goss DO  5/18/2024 23:38 EDT

## 2024-05-19 NOTE — CASE MANAGEMENT/SOCIAL WORK
Case Management Discharge Note      Final Note: Home    Provided Post Acute Provider List?: N/A  Provided Post Acute Provider Quality & Resource List?: N/A    Selected Continued Care - Discharged on 5/16/2024 Admission date: 5/15/2024 - Discharge disposition: Home or Self Care      Destination    No services have been selected for the patient.                Durable Medical Equipment    No services have been selected for the patient.                Dialysis/Infusion    No services have been selected for the patient.                Home Medical Care    No services have been selected for the patient.                Therapy    No services have been selected for the patient.                Community Resources    No services have been selected for the patient.                Community & DME    No services have been selected for the patient.                         Final Discharge Disposition Code: 01 - home or self-care

## 2024-05-20 ENCOUNTER — TELEPHONE (OUTPATIENT)
Dept: OBSTETRICS AND GYNECOLOGY | Facility: CLINIC | Age: 24
End: 2024-05-20
Payer: COMMERCIAL

## 2024-05-20 LAB
B-HEM STREP SPEC QL CULT: NEGATIVE
BACTERIA SPEC AEROBE CULT: NORMAL

## 2024-05-20 NOTE — TELEPHONE ENCOUNTER
Pt is calling requesting induction date.  Pt states that she is to be induced at 37 weeks. Would you like me to schedule pt for induction?

## 2024-05-20 NOTE — TELEPHONE ENCOUNTER
HUB UNABLE TO OPEN NEW TELEPHONE ENCOUNTER - THIS ENCOUNTER FROM TODAY WAS STILL OPEN AND PATIENT CALLED IN WITH NEW OB SYMPTOMS    Provider: DR BURNETT    Caller: JORDON DODGE    Relationship to Patient: SELF    Phone Number: [phone]    Reason for Call: PATIENT CALLING IN WITH NEW SYMPTOMS - WOULD LIKE TO KNOW IF THIS IS NORMAL OR SOMETHING SHE SHOULD BE CONCERNED ABOUT    When was the patient last seen: 5/15/24    When did it start: TODAY, HEADACHE STARTED ABOUT AN HOUR AGO    Characteristics of symptom/severity: LIGHT HEADEDNESS / DIZZINESS / HEADACHE    What therapies/medications have you tried: PATIENT STATED SHE TOOK TYLENOL ABOUT 20 MINUTES BUT IT HASN'T HELPED WITH THE HEADACHE YET

## 2024-05-20 NOTE — TELEPHONE ENCOUNTER
The patient is diabetic and not yet 37 weeks.  She should have an appointment to see me this week at which point we can determine when she should be induced.  Please make sure she has an appointment this week.  I do not see 1 in epic.  Thank you.

## 2024-05-22 ENCOUNTER — ROUTINE PRENATAL (OUTPATIENT)
Dept: OBSTETRICS AND GYNECOLOGY | Facility: CLINIC | Age: 24
End: 2024-05-22
Payer: COMMERCIAL

## 2024-05-22 VITALS — BODY MASS INDEX: 31.47 KG/M2 | SYSTOLIC BLOOD PRESSURE: 132 MMHG | WEIGHT: 210 LBS | DIASTOLIC BLOOD PRESSURE: 80 MMHG

## 2024-05-22 DIAGNOSIS — Z3A.36 36 WEEKS GESTATION OF PREGNANCY: Primary | ICD-10-CM

## 2024-05-22 LAB
GLUCOSE UR STRIP-MCNC: NEGATIVE MG/DL
PROT UR STRIP-MCNC: NEGATIVE MG/DL

## 2024-05-22 NOTE — PROGRESS NOTES
CC: Obstetric visit    HPI: 23-year-old  1 para 0 presents at 36-6/7 weeks for her obstetric visit.  Her baby is moving actively.  She has occasional contractions.    Physical examination    The abdomen is soft and gravid.  There is no epigastric tenderness.  No right upper quadrant tenderness.  No fundal tenderness.  No suprapubic tenderness.  Pelvic examination reveals normal female external genitalia.  There is no blood in the vaginal vault.  Cervix is less than 1 cm dilated.  Extremities are equal in size with minimal pedal edema    Assessment and plan    1.  Intrauterine pregnancy at 36-6/7 weeks  2.  Group B strep cultures are negative.  Counseled.  3.  A2 gestational diabetes.  The patient did not bring her sugars today but we discussed her results.  Fastings have all been in the normal range with the exception of a single value at 96.  Postprandials have all been in the normal range with the exception of 1 slightly elevated value earlier this week.  Also, the patient was evaluated in the hospital for possible preeclampsia.  Her initial protein to creatinine ratio was elevated.  This has been evaluated 3 times since then and all have been normal.  24-hour urine was 246.  The patient reports that she had some headaches over the weekend and that they did not respond to Tylenol.  We discussed a preeclampsia workup in the hospital.  The patient reports that she does not have a headache at all today and does not wish to proceed.  I feel that this is reasonable.  I counseled the patient that if she develops headaches, upper abdominal pain or swelling, she should call or come directly to OB ED.  Otherwise, induction of labor will take place at 37-5/7 weeks.  The patient has been scheduled for .  BPP on Friday of this week.

## 2024-05-26 ENCOUNTER — HOSPITAL ENCOUNTER (EMERGENCY)
Facility: HOSPITAL | Age: 24
Discharge: HOME | End: 2024-05-26
Attending: OBSTETRICS & GYNECOLOGY | Admitting: OBSTETRICS & GYNECOLOGY
Payer: COMMERCIAL

## 2024-05-26 VITALS
TEMPERATURE: 98.2 F | OXYGEN SATURATION: 99 % | HEART RATE: 78 BPM | RESPIRATION RATE: 18 BRPM | SYSTOLIC BLOOD PRESSURE: 133 MMHG | DIASTOLIC BLOOD PRESSURE: 77 MMHG

## 2024-05-26 LAB
ALBUMIN SERPL-MCNC: 3.3 G/DL (ref 3.5–5.2)
ALBUMIN/GLOB SERPL: 1.1 G/DL
ALP SERPL-CCNC: 181 U/L (ref 39–117)
ALT SERPL W P-5'-P-CCNC: 20 U/L (ref 1–33)
ANION GAP SERPL CALCULATED.3IONS-SCNC: 12.4 MMOL/L (ref 5–15)
AST SERPL-CCNC: 15 U/L (ref 1–32)
BACTERIA UR QL AUTO: ABNORMAL /HPF
BASOPHILS # BLD AUTO: 0.03 10*3/MM3 (ref 0–0.2)
BASOPHILS NFR BLD AUTO: 0.4 % (ref 0–1.5)
BILIRUB SERPL-MCNC: 0.2 MG/DL (ref 0–1.2)
BILIRUB UR QL STRIP: NEGATIVE
BUN SERPL-MCNC: 7 MG/DL (ref 6–20)
BUN/CREAT SERPL: 12.7 (ref 7–25)
CALCIUM SPEC-SCNC: 9.2 MG/DL (ref 8.6–10.5)
CHLORIDE SERPL-SCNC: 105 MMOL/L (ref 98–107)
CLARITY UR: ABNORMAL
CO2 SERPL-SCNC: 20.6 MMOL/L (ref 22–29)
COLOR UR: YELLOW
CREAT SERPL-MCNC: 0.55 MG/DL (ref 0.57–1)
CREAT UR-MCNC: 61.7 MG/DL
DEPRECATED RDW RBC AUTO: 52.2 FL (ref 37–54)
EGFRCR SERPLBLD CKD-EPI 2021: 133.9 ML/MIN/1.73
EOSINOPHIL # BLD AUTO: 0.13 10*3/MM3 (ref 0–0.4)
EOSINOPHIL NFR BLD AUTO: 1.6 % (ref 0.3–6.2)
ERYTHROCYTE [DISTWIDTH] IN BLOOD BY AUTOMATED COUNT: 21.7 % (ref 12.3–15.4)
GLOBULIN UR ELPH-MCNC: 3 GM/DL
GLUCOSE SERPL-MCNC: 102 MG/DL (ref 65–99)
GLUCOSE UR STRIP-MCNC: NEGATIVE MG/DL
HCT VFR BLD AUTO: 31 % (ref 34–46.6)
HGB BLD-MCNC: 9.8 G/DL (ref 12–15.9)
HGB UR QL STRIP.AUTO: NEGATIVE
HYALINE CASTS UR QL AUTO: ABNORMAL /LPF
IMM GRANULOCYTES # BLD AUTO: 0.06 10*3/MM3 (ref 0–0.05)
IMM GRANULOCYTES NFR BLD AUTO: 0.7 % (ref 0–0.5)
KETONES UR QL STRIP: NEGATIVE
LEUKOCYTE ESTERASE UR QL STRIP.AUTO: ABNORMAL
LYMPHOCYTES # BLD AUTO: 0.95 10*3/MM3 (ref 0.7–3.1)
LYMPHOCYTES NFR BLD AUTO: 11.4 % (ref 19.6–45.3)
MCH RBC QN AUTO: 22.9 PG (ref 26.6–33)
MCHC RBC AUTO-ENTMCNC: 31.6 G/DL (ref 31.5–35.7)
MCV RBC AUTO: 72.4 FL (ref 79–97)
MONOCYTES # BLD AUTO: 0.65 10*3/MM3 (ref 0.1–0.9)
MONOCYTES NFR BLD AUTO: 7.8 % (ref 5–12)
NEUTROPHILS NFR BLD AUTO: 6.53 10*3/MM3 (ref 1.7–7)
NEUTROPHILS NFR BLD AUTO: 78.1 % (ref 42.7–76)
NITRITE UR QL STRIP: NEGATIVE
PH UR STRIP.AUTO: 7 [PH] (ref 5–8)
PLATELET # BLD AUTO: 280 10*3/MM3 (ref 140–450)
PMV BLD AUTO: 9.9 FL (ref 6–12)
POTASSIUM SERPL-SCNC: 3.8 MMOL/L (ref 3.5–5.2)
PROT ?TM UR-MCNC: 7.6 MG/DL
PROT SERPL-MCNC: 6.3 G/DL (ref 6–8.5)
PROT UR QL STRIP: NEGATIVE
PROT/CREAT UR: 123.2 MG/G CREA (ref 0–200)
RBC # BLD AUTO: 4.28 10*6/MM3 (ref 3.77–5.28)
RBC # UR STRIP: ABNORMAL /HPF
REF LAB TEST METHOD: ABNORMAL
SODIUM SERPL-SCNC: 138 MMOL/L (ref 136–145)
SP GR UR STRIP: 1.01 (ref 1–1.03)
SQUAMOUS #/AREA URNS HPF: ABNORMAL /HPF
UROBILINOGEN UR QL STRIP: ABNORMAL
WBC # UR STRIP: ABNORMAL /HPF
WBC NRBC COR # BLD AUTO: 8.35 10*3/MM3 (ref 3.4–10.8)

## 2024-05-26 PROCEDURE — 81001 URINALYSIS AUTO W/SCOPE: CPT | Performed by: OBSTETRICS & GYNECOLOGY

## 2024-05-26 PROCEDURE — 59025 FETAL NON-STRESS TEST: CPT

## 2024-05-26 PROCEDURE — 84156 ASSAY OF PROTEIN URINE: CPT | Performed by: OBSTETRICS & GYNECOLOGY

## 2024-05-26 PROCEDURE — 85025 COMPLETE CBC W/AUTO DIFF WBC: CPT | Performed by: OBSTETRICS & GYNECOLOGY

## 2024-05-26 PROCEDURE — 82570 ASSAY OF URINE CREATININE: CPT | Performed by: OBSTETRICS & GYNECOLOGY

## 2024-05-26 PROCEDURE — 80053 COMPREHEN METABOLIC PANEL: CPT | Performed by: OBSTETRICS & GYNECOLOGY

## 2024-05-26 PROCEDURE — 99284 EMERGENCY DEPT VISIT MOD MDM: CPT | Performed by: OBSTETRICS & GYNECOLOGY

## 2024-05-26 PROCEDURE — 87086 URINE CULTURE/COLONY COUNT: CPT | Performed by: OBSTETRICS & GYNECOLOGY

## 2024-05-26 NOTE — OBED NOTES
[facility] [place]    Patient Name: Abdon Andres  YOB: 2000  MRN: [id number]  Admission Date: 2024  2:16 AM  Date of Service: 2024    Chief Complaint: Abdominal Pain (Patient presents to [facility] with intermittent abdominal pain rating 4 out of 10. Patient states pain comes and goes. Patient endorses active fetal movement. Denies loss of fluid and vaginal bleeding)        Subjective     Abdon Andres is a 23 y.o. female  at 37w3d with Estimated Date of Delivery: 24 who presents with the chief complaint listed above.  Patient presents with reports of contractions throughout the day coming as frequently as every 6 minutes and rating them a 4 out of 10 in pain.  Patient is also having intermittent right upper quadrant pain though review of records shows that this has been ongoing for several weeks now.  Patient is also recently been evaluated for preeclampsia as well as been followed for gestational diabetes     She sees Nelson Moeller MD for her prenatal care. Her pregnancy has been complicated by: Yet, gestational diabetes, obesity, gestational hypertension, history of STDs, history of back surgery, anxiety    She describes fetal movement as normal.  She denies rupture of membranes.  She denies vaginal bleeding. She is feeling contractions.        Objective   Patient Active Problem List    Diagnosis     Pregnancy [Z34.90]     Pre-eclampsia in third trimester, without severe features [O14.93]     Insulin controlled gestational diabetes mellitus (GDM) in third trimester [O24.414]         OB History    Para Term  AB Living   1 0 0 0 0 0   SAB IAB Ectopic Molar Multiple Live Births   0 0 0 0 0 0      # Outcome Date GA Lbr Cristino/2nd Weight Sex Type Anes PTL Lv   1 Current                 Past Medical History:   Diagnosis Date    Anxiety     Chlamydia     Gestational diabetes     insulin dependent    History of back surgery 2019    Fell out of window and broke back        Past Surgical History:   Procedure Laterality Date    SPINE SURGERY         No current facility-administered medications on file prior to encounter.     Current Outpatient Medications on File Prior to Encounter   Medication Sig Dispense Refill    acetaminophen (TYLENOL) 500 MG tablet Take 2 tablets by mouth Every 6 (Six) Hours As Needed for Mild Pain.      docusate sodium (Colace) 100 MG capsule Take 1 capsule by mouth 2 (Two) Times a Day. 60 capsule 1    fluticasone (CUTIVATE) 0.005 % ointment APPLY TO AFFECTED AREAS OF THE BODY TWICE DAILY      hydrocortisone 2.5 % ointment APPLY TO AFFECTED AREA OF FACE TWICE DAILY      Insulin Glargine (LANTUS SOLOSTAR) 100 UNIT/ML injection pen Inject 16 units under the skin each AM and 20 units under the skin each evening 15 mL 3    Accu-Chek Softclix Lancets lancets USE 1 LANCET TO GET BLOOD SAMPLE FOR BLOOD GLUCOSE TESTING FOUR TIMES DAILY 100 each 1    Blood Glucose Monitoring Suppl (Accu-Chek Guide) w/Device kit USE 1 KIT TO CHECK BLOOD GLUCOSE      ferrous sulfate 325 (65 FE) MG tablet Take 1 tablet by mouth Daily With Breakfast. 30 tablet 2    glucose blood test strip Take blood sugar fasting (AM) and 2 hours after start of breakfast, lunch and dinner 100 each 12    Insulin Pen Needle (BD Pen Needle Melanie 2nd Gen) 32G X 4 MM misc Use 1 each 2 (Two) Times a Day. 120 each 1    ondansetron (Zofran) 4 MG tablet Take 1 tablet by mouth Every 8 (Eight) Hours As Needed for Nausea or Vomiting. 10 tablet 0    polyethylene glycol (MiraLax) 17 g packet Take 17 g by mouth Daily. 20 each 1    prenatal vitamin (prenatal, CLASSIC, vitamin) tablet Take  by mouth Daily.         Allergies   Allergen Reactions    Other Rash     UTI medication (prescribed by hospital)    UTI medication (prescribed by hospital)   UTI medication (prescribed by hospital)       No family history on file.    Social History     Socioeconomic History    Marital status: Single   Tobacco Use    Smoking status:  Never     Passive exposure: Never    Smokeless tobacco: Never   Vaping Use    Vaping status: Never Used   Substance and Sexual Activity    Alcohol use: Never    Drug use: Never    Sexual activity: Yes           Review of Systems   Constitutional:  Negative for chills and fever.   HENT: Negative.     Eyes:  Negative for photophobia and visual disturbance.   Respiratory:  Negative for shortness of breath.    Cardiovascular:  Negative for chest pain.   Gastrointestinal:  Positive for abdominal pain. Negative for nausea.   Psychiatric/Behavioral:  The patient is not nervous/anxious.           PHYSICAL EXAM:      VITAL SIGNS:  Vitals:    05/26/24 0233   BP: 133/77   BP Location: Right arm   Patient Position: Sitting   Pulse: 78   Resp: 18   Temp: 98.2 °F (36.8 °C)   TempSrc: Oral   SpO2: 99%            FHT'S:    150 with moderate variability and accelerations                                     PHYSICAL EXAM:      General: well developed; well nourished  no acute distress   Heart: Not performed.   Lungs   breathing is unlabored   Abdomen: Gravid and non tender     Extremities: trace edema, DTRs 1 plus, no clonus       Cervix: Per RN  Cervical Dilation (cm): 1  Cervical Effacement: 60%  Fetal Station: -2  Cervical Consistency: soft  Cervical Position: mid-position     Contractions:   irregular                    LABS AND TESTING ORDERED:  Uterine and fetal monitoring  Urinalysis  CBC, CMP, protein urine ratio, serial cervical exams    LAB RESULTS:    No results found for this or any previous visit (from the past 24 hour(s)).    Lab Results   Component Value Date    ABO B 05/16/2024    RH Positive 05/16/2024       Lab Results   Component Value Date    STREPGPB Negative 05/15/2024                 External Prenatal Results       Pregnancy Outside Results - Transcribed From Office Records - See Scanned Records For Details       Test Value Date Time    ABO  B  05/16/24 0027    Rh  Positive  05/16/24 0027    Antibody Screen   Negative  05/16/24 0027       Negative  05/12/24 2142       Negative  05/10/24 0832       Negative  03/06/24 1407       Negative  10/30/23 1109    Varicella IgG       Rubella  6.48 index 10/30/23 1109    Hgb  9.4 g/dL 05/18/24 2304       9.0 g/dL 05/16/24 0627       9.6 g/dL 05/15/24 2219       8.7 g/dL 05/13/24 0635       9.0 g/dL 05/12/24 2142       8.4 g/dL 05/12/24 0511       8.7 g/dL 05/11/24 0759       9.2 g/dL 05/10/24 0832       9.1 g/dL 05/09/24 1525       9.0 g/dL 04/18/24 1259       9.2 g/dL 03/19/24 1445       9.8 g/dL 03/06/24 1407       12.3 g/dL 10/30/23 1109      ^ 12.3 g/dL 09/24/23 1714    Hct  30.3 % 05/18/24 2304       28.4 % 05/16/24 0627       30.2 % 05/15/24 2219       28.0 % 05/13/24 0635       29.2 % 05/12/24 2142       27.1 % 05/12/24 0511       27.7 % 05/11/24 0759       29.4 % 05/10/24 0832       29.1 % 05/09/24 1525       28.0 % 04/18/24 1259       28.7 % 03/19/24 1445       30.4 % 03/06/24 1407       37.4 % 10/30/23 1109      ^ 37.9 % 09/24/23 1714    Glucose Fasting GTT  107 mg/dL 03/11/24 0844    Glucose Tolerance Test 1 hour  222 mg/dL 03/11/24 0844    Glucose Tolerance Test 3 hour  174 mg/dL 03/11/24 0844    Gonorrhea (discrete)  Negative  04/10/24 1642       Negative  10/30/23 1508    Chlamydia (discrete)  Negative  04/10/24 1642       Negative  10/30/23 1508    RPR  Non Reactive  10/30/23 1109    VDRL       Syphilis Antibody       HBsAg  Negative  10/30/23 1109    Herpes Simplex Virus PCR       Herpes Simplex VIrus Culture       HIV  Non Reactive  10/30/23 1109    Hep C RNA Quant PCR       Hep C Antibody  Non Reactive  10/30/23 1109    AFP  59.5 ng/mL 01/15/24 1225    Group B Strep  Negative  05/15/24 1422    GBS Susceptibility to Clindamycin       GBS Susceptibility to Erythromycin       Fetal Fibronectin  Negative  05/09/24 1529    Genetic Testing, Maternal Blood                 Drug Screening       Test Value Date Time    NIPT        Urine Drug Screen       Amphetamine Screen   Negative ng/mL 10/30/23 1521    Barbiturate Screen  Negative ng/mL 10/30/23 1521    Benzodiazepine Screen  Negative ng/mL 10/30/23 1521    Methadone Screen  Negative ng/mL 10/30/23 1521    Phencyclidine Screen  Negative ng/mL 10/30/23 1521    Opiates Screen       THC Screen       Cocaine Screen       Propoxyphene Screen  Negative ng/mL 10/30/23 1521    Buprenorphine Screen       Methamphetamine Screen       Oxycodone Screen                 Legend    ^: Historical                              Impression:   @ 37w3d .  Final Diagnosis: Right upper quadrant pain, probably muscle skeletal in nature, rule out labor    Plan:  1. Discharge to home.    2. Plan of care has been reviewed with patient along with risks, benefits of treatment.   All questions have been answered. Call health care provider for any further concerns and keep office appointments.  3.  Comfort measures, labor precautions, toxemia precautions      I discussed the patients findings and my recommendations with patient, family, and nursing staff      Gaby Granados MD  2024  02:44 EDT

## 2024-05-27 LAB — BACTERIA SPEC AEROBE CULT: NO GROWTH

## 2024-05-28 ENCOUNTER — HOSPITAL ENCOUNTER (INPATIENT)
Facility: HOSPITAL | Age: 24
LOS: 3 days | Discharge: HOME | End: 2024-05-31
Attending: OBSTETRICS & GYNECOLOGY | Admitting: OBSTETRICS & GYNECOLOGY
Payer: COMMERCIAL

## 2024-05-28 ENCOUNTER — ANESTHESIA EVENT (OUTPATIENT)
Dept: LABOR AND DELIVERY | Facility: HOSPITAL | Age: 24
End: 2024-05-28
Payer: COMMERCIAL

## 2024-05-28 ENCOUNTER — HOSPITAL ENCOUNTER (OUTPATIENT)
Dept: LABOR AND DELIVERY | Facility: HOSPITAL | Age: 24
Discharge: HOME | End: 2024-05-28
Payer: COMMERCIAL

## 2024-05-28 ENCOUNTER — ANESTHESIA (OUTPATIENT)
Dept: LABOR AND DELIVERY | Facility: HOSPITAL | Age: 24
End: 2024-05-28
Payer: COMMERCIAL

## 2024-05-28 DIAGNOSIS — K59.09 OTHER CONSTIPATION: ICD-10-CM

## 2024-05-28 LAB
ABO GROUP BLD: NORMAL
ALBUMIN SERPL-MCNC: 3.5 G/DL (ref 3.5–5.2)
ALBUMIN/GLOB SERPL: 1.1 G/DL
ALP SERPL-CCNC: 189 U/L (ref 39–117)
ALT SERPL W P-5'-P-CCNC: 21 U/L (ref 1–33)
ANION GAP SERPL CALCULATED.3IONS-SCNC: 14 MMOL/L (ref 5–15)
AST SERPL-CCNC: 14 U/L (ref 1–32)
BASOPHILS # BLD AUTO: 0.01 10*3/MM3 (ref 0–0.2)
BASOPHILS NFR BLD AUTO: 0.1 % (ref 0–1.5)
BILIRUB SERPL-MCNC: 0.2 MG/DL (ref 0–1.2)
BLD GP AB SCN SERPL QL: NEGATIVE
BUN SERPL-MCNC: 11 MG/DL (ref 6–20)
BUN/CREAT SERPL: 16.2 (ref 7–25)
CALCIUM SPEC-SCNC: 8.8 MG/DL (ref 8.6–10.5)
CHLORIDE SERPL-SCNC: 102 MMOL/L (ref 98–107)
CO2 SERPL-SCNC: 19 MMOL/L (ref 22–29)
CREAT SERPL-MCNC: 0.68 MG/DL (ref 0.57–1)
DEPRECATED RDW RBC AUTO: 52.1 FL (ref 37–54)
EGFRCR SERPLBLD CKD-EPI 2021: 127.3 ML/MIN/1.73
EOSINOPHIL # BLD AUTO: 0.09 10*3/MM3 (ref 0–0.4)
EOSINOPHIL NFR BLD AUTO: 0.9 % (ref 0.3–6.2)
ERYTHROCYTE [DISTWIDTH] IN BLOOD BY AUTOMATED COUNT: 21.9 % (ref 12.3–15.4)
GLOBULIN UR ELPH-MCNC: 3.3 GM/DL
GLUCOSE BLDC GLUCOMTR-MCNC: 87 MG/DL (ref 70–130)
GLUCOSE SERPL-MCNC: 98 MG/DL (ref 65–99)
HCT VFR BLD AUTO: 31.6 % (ref 34–46.6)
HGB BLD-MCNC: 10.1 G/DL (ref 12–15.9)
IMM GRANULOCYTES # BLD AUTO: 0.09 10*3/MM3 (ref 0–0.05)
IMM GRANULOCYTES NFR BLD AUTO: 0.9 % (ref 0–0.5)
LYMPHOCYTES # BLD AUTO: 0.76 10*3/MM3 (ref 0.7–3.1)
LYMPHOCYTES NFR BLD AUTO: 7.9 % (ref 19.6–45.3)
MCH RBC QN AUTO: 22.5 PG (ref 26.6–33)
MCHC RBC AUTO-ENTMCNC: 32 G/DL (ref 31.5–35.7)
MCV RBC AUTO: 70.4 FL (ref 79–97)
MONOCYTES # BLD AUTO: 0.56 10*3/MM3 (ref 0.1–0.9)
MONOCYTES NFR BLD AUTO: 5.8 % (ref 5–12)
NEUTROPHILS NFR BLD AUTO: 8.07 10*3/MM3 (ref 1.7–7)
NEUTROPHILS NFR BLD AUTO: 84.4 % (ref 42.7–76)
PLATELET # BLD AUTO: 319 10*3/MM3 (ref 140–450)
PMV BLD AUTO: 10 FL (ref 6–12)
POTASSIUM SERPL-SCNC: 3.6 MMOL/L (ref 3.5–5.2)
PROT SERPL-MCNC: 6.8 G/DL (ref 6–8.5)
RBC # BLD AUTO: 4.49 10*6/MM3 (ref 3.77–5.28)
RH BLD: POSITIVE
SODIUM SERPL-SCNC: 135 MMOL/L (ref 136–145)
T PALLIDUM IGG SER QL: NORMAL
T&S EXPIRATION DATE: NORMAL
WBC NRBC COR # BLD AUTO: 9.58 10*3/MM3 (ref 3.4–10.8)

## 2024-05-28 PROCEDURE — 80053 COMPREHEN METABOLIC PANEL: CPT | Performed by: STUDENT IN AN ORGANIZED HEALTH CARE EDUCATION/TRAINING PROGRAM

## 2024-05-28 PROCEDURE — 3E0P7VZ INTRODUCTION OF HORMONE INTO FEMALE REPRODUCTIVE, VIA NATURAL OR ARTIFICIAL OPENING: ICD-10-PCS | Performed by: OBSTETRICS & GYNECOLOGY

## 2024-05-28 PROCEDURE — 86780 TREPONEMA PALLIDUM: CPT | Performed by: STUDENT IN AN ORGANIZED HEALTH CARE EDUCATION/TRAINING PROGRAM

## 2024-05-28 PROCEDURE — 86850 RBC ANTIBODY SCREEN: CPT | Performed by: STUDENT IN AN ORGANIZED HEALTH CARE EDUCATION/TRAINING PROGRAM

## 2024-05-28 PROCEDURE — 86900 BLOOD TYPING SEROLOGIC ABO: CPT | Performed by: STUDENT IN AN ORGANIZED HEALTH CARE EDUCATION/TRAINING PROGRAM

## 2024-05-28 PROCEDURE — 82948 REAGENT STRIP/BLOOD GLUCOSE: CPT

## 2024-05-28 PROCEDURE — 85025 COMPLETE CBC W/AUTO DIFF WBC: CPT | Performed by: STUDENT IN AN ORGANIZED HEALTH CARE EDUCATION/TRAINING PROGRAM

## 2024-05-28 PROCEDURE — 86901 BLOOD TYPING SEROLOGIC RH(D): CPT | Performed by: STUDENT IN AN ORGANIZED HEALTH CARE EDUCATION/TRAINING PROGRAM

## 2024-05-28 RX ORDER — EPHEDRINE SULFATE 50 MG/ML
5 INJECTION, SOLUTION INTRAVENOUS
Status: DISCONTINUED | OUTPATIENT
Start: 2024-05-28 | End: 2024-05-29 | Stop reason: HOSPADM

## 2024-05-28 RX ORDER — LIDOCAINE HYDROCHLORIDE 10 MG/ML
0.5 INJECTION, SOLUTION INFILTRATION; PERINEURAL ONCE AS NEEDED
Status: DISCONTINUED | OUTPATIENT
Start: 2024-05-28 | End: 2024-05-29 | Stop reason: HOSPADM

## 2024-05-28 RX ORDER — FENTANYL/ROPIVACAINE/NS/PF 2MCG/ML-.2
10 PLASTIC BAG, INJECTION (ML) INJECTION CONTINUOUS
Status: DISCONTINUED | OUTPATIENT
Start: 2024-05-28 | End: 2024-05-31

## 2024-05-28 RX ORDER — ACETAMINOPHEN 325 MG/1
650 TABLET ORAL EVERY 4 HOURS PRN
Status: DISCONTINUED | OUTPATIENT
Start: 2024-05-28 | End: 2024-05-29 | Stop reason: HOSPADM

## 2024-05-28 RX ORDER — SODIUM CHLORIDE 9 MG/ML
40 INJECTION, SOLUTION INTRAVENOUS AS NEEDED
Status: DISCONTINUED | OUTPATIENT
Start: 2024-05-28 | End: 2024-05-29 | Stop reason: HOSPADM

## 2024-05-28 RX ORDER — MORPHINE SULFATE 2 MG/ML
1 INJECTION, SOLUTION INTRAMUSCULAR; INTRAVENOUS EVERY 4 HOURS PRN
Status: DISCONTINUED | OUTPATIENT
Start: 2024-05-28 | End: 2024-05-29 | Stop reason: HOSPADM

## 2024-05-28 RX ORDER — SODIUM CHLORIDE, SODIUM LACTATE, POTASSIUM CHLORIDE, CALCIUM CHLORIDE 600; 310; 30; 20 MG/100ML; MG/100ML; MG/100ML; MG/100ML
125 INJECTION, SOLUTION INTRAVENOUS CONTINUOUS
Status: DISCONTINUED | OUTPATIENT
Start: 2024-05-28 | End: 2024-05-31

## 2024-05-28 RX ORDER — ONDANSETRON 4 MG/1
4 TABLET, ORALLY DISINTEGRATING ORAL EVERY 6 HOURS PRN
Status: DISCONTINUED | OUTPATIENT
Start: 2024-05-28 | End: 2024-05-29 | Stop reason: HOSPADM

## 2024-05-28 RX ORDER — MAGNESIUM CARB/ALUMINUM HYDROX 105-160MG
30 TABLET,CHEWABLE ORAL ONCE AS NEEDED
Status: DISCONTINUED | OUTPATIENT
Start: 2024-05-28 | End: 2024-05-29 | Stop reason: HOSPADM

## 2024-05-28 RX ORDER — FAMOTIDINE 10 MG/ML
20 INJECTION, SOLUTION INTRAVENOUS ONCE AS NEEDED
Status: DISCONTINUED | OUTPATIENT
Start: 2024-05-28 | End: 2024-05-29 | Stop reason: HOSPADM

## 2024-05-28 RX ORDER — TERBUTALINE SULFATE 1 MG/ML
0.25 INJECTION, SOLUTION SUBCUTANEOUS AS NEEDED
Status: DISCONTINUED | OUTPATIENT
Start: 2024-05-28 | End: 2024-05-29 | Stop reason: HOSPADM

## 2024-05-28 RX ORDER — ONDANSETRON 2 MG/ML
4 INJECTION INTRAMUSCULAR; INTRAVENOUS EVERY 6 HOURS PRN
Status: DISCONTINUED | OUTPATIENT
Start: 2024-05-28 | End: 2024-05-29 | Stop reason: HOSPADM

## 2024-05-28 RX ORDER — DIPHENHYDRAMINE HYDROCHLORIDE 50 MG/ML
12.5 INJECTION INTRAMUSCULAR; INTRAVENOUS EVERY 8 HOURS PRN
Status: DISCONTINUED | OUTPATIENT
Start: 2024-05-28 | End: 2024-05-29 | Stop reason: HOSPADM

## 2024-05-28 RX ORDER — SODIUM CHLORIDE 0.9 % (FLUSH) 0.9 %
10 SYRINGE (ML) INJECTION AS NEEDED
Status: DISCONTINUED | OUTPATIENT
Start: 2024-05-28 | End: 2024-05-29 | Stop reason: HOSPADM

## 2024-05-28 RX ORDER — FAMOTIDINE 20 MG/1
20 TABLET, FILM COATED ORAL 2 TIMES DAILY PRN
Status: DISCONTINUED | OUTPATIENT
Start: 2024-05-28 | End: 2024-05-29 | Stop reason: HOSPADM

## 2024-05-28 RX ORDER — FAMOTIDINE 10 MG/ML
20 INJECTION, SOLUTION INTRAVENOUS 2 TIMES DAILY PRN
Status: DISCONTINUED | OUTPATIENT
Start: 2024-05-28 | End: 2024-05-29 | Stop reason: HOSPADM

## 2024-05-28 RX ORDER — SODIUM CHLORIDE 0.9 % (FLUSH) 0.9 %
10 SYRINGE (ML) INJECTION EVERY 12 HOURS SCHEDULED
Status: DISCONTINUED | OUTPATIENT
Start: 2024-05-28 | End: 2024-05-29 | Stop reason: HOSPADM

## 2024-05-28 RX ORDER — ONDANSETRON 2 MG/ML
4 INJECTION INTRAMUSCULAR; INTRAVENOUS ONCE AS NEEDED
Status: DISCONTINUED | OUTPATIENT
Start: 2024-05-28 | End: 2024-05-29 | Stop reason: HOSPADM

## 2024-05-28 RX ORDER — NALOXONE HCL 0.4 MG/ML
0.4 VIAL (ML) INJECTION
Status: DISCONTINUED | OUTPATIENT
Start: 2024-05-28 | End: 2024-05-29 | Stop reason: HOSPADM

## 2024-05-28 RX ADMIN — DINOPROSTONE 10 MG: 10 INSERT VAGINAL at 19:35

## 2024-05-28 NOTE — PLAN OF CARE
Goal Outcome Evaluation:  Plan of Care Reviewed With: patient        Progress: improving  Outcome Evaluation: pt is IOL at 37wk5d for GDM. VSS and pt is saline locked. Plan to place cervidil at 1900 if pt is < 2 cm.

## 2024-05-29 ENCOUNTER — TELEPHONE (OUTPATIENT)
Dept: OBSTETRICS AND GYNECOLOGY | Facility: CLINIC | Age: 24
End: 2024-05-29

## 2024-05-29 LAB
GLUCOSE BLDC GLUCOMTR-MCNC: 102 MG/DL (ref 70–130)
GLUCOSE BLDC GLUCOMTR-MCNC: 126 MG/DL (ref 70–130)
GLUCOSE BLDC GLUCOMTR-MCNC: 67 MG/DL (ref 70–130)
GLUCOSE BLDC GLUCOMTR-MCNC: 76 MG/DL (ref 70–130)
GLUCOSE BLDC GLUCOMTR-MCNC: 79 MG/DL (ref 70–130)
GLUCOSE BLDC GLUCOMTR-MCNC: 82 MG/DL (ref 70–130)

## 2024-05-29 PROCEDURE — 0UQMXZZ REPAIR VULVA, EXTERNAL APPROACH: ICD-10-PCS | Performed by: OBSTETRICS & GYNECOLOGY

## 2024-05-29 PROCEDURE — 82948 REAGENT STRIP/BLOOD GLUCOSE: CPT

## 2024-05-29 PROCEDURE — 25010000002 MORPHINE PER 10 MG: Performed by: STUDENT IN AN ORGANIZED HEALTH CARE EDUCATION/TRAINING PROGRAM

## 2024-05-29 PROCEDURE — 82947 ASSAY GLUCOSE BLOOD QUANT: CPT | Performed by: OBSTETRICS & GYNECOLOGY

## 2024-05-29 PROCEDURE — C1755 CATHETER, INTRASPINAL: HCPCS | Performed by: ANESTHESIOLOGY

## 2024-05-29 PROCEDURE — S0260 H&P FOR SURGERY: HCPCS | Performed by: OBSTETRICS & GYNECOLOGY

## 2024-05-29 PROCEDURE — 25810000003 SODIUM CHLORIDE 0.9 % SOLUTION: Performed by: OBSTETRICS & GYNECOLOGY

## 2024-05-29 PROCEDURE — 59400 OBSTETRICAL CARE: CPT | Performed by: OBSTETRICS & GYNECOLOGY

## 2024-05-29 PROCEDURE — 25810000003 LACTATED RINGERS PER 1000 ML: Performed by: STUDENT IN AN ORGANIZED HEALTH CARE EDUCATION/TRAINING PROGRAM

## 2024-05-29 RX ORDER — IBUPROFEN 600 MG/1
600 TABLET ORAL EVERY 6 HOURS PRN
Status: DISCONTINUED | OUTPATIENT
Start: 2024-05-29 | End: 2024-05-31 | Stop reason: HOSPADM

## 2024-05-29 RX ORDER — HYDROCORTISONE 25 MG/G
1 CREAM TOPICAL AS NEEDED
Status: DISCONTINUED | OUTPATIENT
Start: 2024-05-29 | End: 2024-05-31 | Stop reason: HOSPADM

## 2024-05-29 RX ORDER — OXYTOCIN/0.9 % SODIUM CHLORIDE 30/500 ML
999 PLASTIC BAG, INJECTION (ML) INTRAVENOUS ONCE
Status: DISCONTINUED | OUTPATIENT
Start: 2024-05-29 | End: 2024-05-29 | Stop reason: HOSPADM

## 2024-05-29 RX ORDER — DOCUSATE SODIUM 100 MG/1
100 CAPSULE, LIQUID FILLED ORAL 2 TIMES DAILY
Status: DISCONTINUED | OUTPATIENT
Start: 2024-05-29 | End: 2024-05-31 | Stop reason: HOSPADM

## 2024-05-29 RX ORDER — ERYTHROMYCIN 5 MG/G
OINTMENT OPHTHALMIC
Status: ACTIVE
Start: 2024-05-29 | End: 2024-05-30

## 2024-05-29 RX ORDER — OXYTOCIN/0.9 % SODIUM CHLORIDE 30/500 ML
250 PLASTIC BAG, INJECTION (ML) INTRAVENOUS CONTINUOUS
Status: ACTIVE | OUTPATIENT
Start: 2024-05-29 | End: 2024-05-29

## 2024-05-29 RX ORDER — PRENATAL VIT/IRON FUM/FOLIC AC 27MG-0.8MG
1 TABLET ORAL DAILY
Status: DISCONTINUED | OUTPATIENT
Start: 2024-05-30 | End: 2024-05-31 | Stop reason: HOSPADM

## 2024-05-29 RX ORDER — TRANEXAMIC ACID 10 MG/ML
1000 INJECTION, SOLUTION INTRAVENOUS ONCE AS NEEDED
Status: DISCONTINUED | OUTPATIENT
Start: 2024-05-29 | End: 2024-05-31 | Stop reason: HOSPADM

## 2024-05-29 RX ORDER — MISOPROSTOL 200 UG/1
800 TABLET ORAL ONCE AS NEEDED
Status: DISCONTINUED | OUTPATIENT
Start: 2024-05-29 | End: 2024-05-29 | Stop reason: HOSPADM

## 2024-05-29 RX ORDER — HYDROCODONE BITARTRATE AND ACETAMINOPHEN 5; 325 MG/1; MG/1
1 TABLET ORAL EVERY 4 HOURS PRN
Status: DISCONTINUED | OUTPATIENT
Start: 2024-05-29 | End: 2024-05-31 | Stop reason: HOSPADM

## 2024-05-29 RX ORDER — OXYTOCIN/0.9 % SODIUM CHLORIDE 30/500 ML
1-20 PLASTIC BAG, INJECTION (ML) INTRAVENOUS
Status: DISCONTINUED | OUTPATIENT
Start: 2024-05-29 | End: 2024-05-31

## 2024-05-29 RX ORDER — BISACODYL 10 MG
10 SUPPOSITORY, RECTAL RECTAL DAILY PRN
Status: DISCONTINUED | OUTPATIENT
Start: 2024-05-30 | End: 2024-05-31 | Stop reason: HOSPADM

## 2024-05-29 RX ORDER — HYDROXYZINE 50 MG/1
50 TABLET, FILM COATED ORAL NIGHTLY PRN
Status: DISCONTINUED | OUTPATIENT
Start: 2024-05-29 | End: 2024-05-31 | Stop reason: HOSPADM

## 2024-05-29 RX ORDER — ACETAMINOPHEN 325 MG/1
650 TABLET ORAL EVERY 6 HOURS PRN
Status: DISCONTINUED | OUTPATIENT
Start: 2024-05-29 | End: 2024-05-31 | Stop reason: HOSPADM

## 2024-05-29 RX ORDER — TRANEXAMIC ACID 10 MG/ML
1000 INJECTION, SOLUTION INTRAVENOUS ONCE AS NEEDED
Status: DISCONTINUED | OUTPATIENT
Start: 2024-05-29 | End: 2024-05-31

## 2024-05-29 RX ORDER — CARBOPROST TROMETHAMINE 250 UG/ML
250 INJECTION, SOLUTION INTRAMUSCULAR
Status: DISCONTINUED | OUTPATIENT
Start: 2024-05-29 | End: 2024-05-29 | Stop reason: HOSPADM

## 2024-05-29 RX ORDER — ONDANSETRON 2 MG/ML
4 INJECTION INTRAMUSCULAR; INTRAVENOUS EVERY 6 HOURS PRN
Status: DISCONTINUED | OUTPATIENT
Start: 2024-05-29 | End: 2024-05-31 | Stop reason: HOSPADM

## 2024-05-29 RX ORDER — PHYTONADIONE 1 MG/.5ML
INJECTION, EMULSION INTRAMUSCULAR; INTRAVENOUS; SUBCUTANEOUS
Status: ACTIVE
Start: 2024-05-29 | End: 2024-05-30

## 2024-05-29 RX ORDER — METHYLERGONOVINE MALEATE 0.2 MG/ML
200 INJECTION INTRAVENOUS ONCE AS NEEDED
Status: DISCONTINUED | OUTPATIENT
Start: 2024-05-29 | End: 2024-05-29 | Stop reason: HOSPADM

## 2024-05-29 RX ORDER — HYDROCODONE BITARTRATE AND ACETAMINOPHEN 10; 325 MG/1; MG/1
1 TABLET ORAL EVERY 4 HOURS PRN
Status: DISCONTINUED | OUTPATIENT
Start: 2024-05-29 | End: 2024-05-31 | Stop reason: HOSPADM

## 2024-05-29 RX ORDER — OXYTOCIN/0.9 % SODIUM CHLORIDE 30/500 ML
125 PLASTIC BAG, INJECTION (ML) INTRAVENOUS ONCE AS NEEDED
Status: COMPLETED | OUTPATIENT
Start: 2024-05-29 | End: 2024-05-29

## 2024-05-29 RX ORDER — SODIUM CHLORIDE 0.9 % (FLUSH) 0.9 %
1-10 SYRINGE (ML) INJECTION AS NEEDED
Status: DISCONTINUED | OUTPATIENT
Start: 2024-05-29 | End: 2024-05-31 | Stop reason: HOSPADM

## 2024-05-29 RX ADMIN — Medication 10 ML/HR: at 16:17

## 2024-05-29 RX ADMIN — SODIUM CHLORIDE, POTASSIUM CHLORIDE, SODIUM LACTATE AND CALCIUM CHLORIDE 125 ML/HR: 600; 310; 30; 20 INJECTION, SOLUTION INTRAVENOUS at 12:09

## 2024-05-29 RX ADMIN — LIDOCAINE HYDROCHLORIDE 4 MG: 10; .005 INJECTION, SOLUTION EPIDURAL; INFILTRATION; INTRACAUDAL; PERINEURAL at 16:11

## 2024-05-29 RX ADMIN — SODIUM CHLORIDE, POTASSIUM CHLORIDE, SODIUM LACTATE AND CALCIUM CHLORIDE 125 ML/HR: 600; 310; 30; 20 INJECTION, SOLUTION INTRAVENOUS at 08:29

## 2024-05-29 RX ADMIN — MORPHINE SULFATE 1 MG: 2 INJECTION, SOLUTION INTRAMUSCULAR; INTRAVENOUS at 09:49

## 2024-05-29 RX ADMIN — Medication 10 ML/HR: at 19:11

## 2024-05-29 RX ADMIN — SODIUM CHLORIDE, POTASSIUM CHLORIDE, SODIUM LACTATE AND CALCIUM CHLORIDE 125 ML/HR: 600; 310; 30; 20 INJECTION, SOLUTION INTRAVENOUS at 16:24

## 2024-05-29 RX ADMIN — LIDOCAINE HYDROCHLORIDE 5 MG: 10; .005 INJECTION, SOLUTION EPIDURAL; INFILTRATION; INTRACAUDAL; PERINEURAL at 16:14

## 2024-05-29 RX ADMIN — SODIUM CHLORIDE 500 ML: 9 INJECTION, SOLUTION INTRAVENOUS at 17:01

## 2024-05-29 RX ADMIN — Medication 125 ML/HR: at 21:44

## 2024-05-29 NOTE — H&P
H&P Note    Patient Identification:  Name: Abdon Andres  Age: 23 y.o.  Sex: female  :  2000  MRN: [id number]                       Chief Complaint: A2 gestational diabetes    History of Present Illness:   23-year-old  1 para 0 presents at 37-6/7 weeks for an induction of labor due to insulin requiring gestational diabetes.  She also has several episodes of elevated blood pressures.  Preeclampsia workup has been negative.  Group B strep cultures are negative.    [facility]:  [facility]  Past Medical History:  Past Medical History:   Diagnosis Date    Anxiety     Chlamydia     Gestational diabetes     insulin dependent    History of back surgery 2019    Fell out of window and broke back     Past Surgical History:  Past Surgical History:   Procedure Laterality Date    SPINE SURGERY        Home Meds:  Medications Prior to Admission   Medication Sig Dispense Refill Last Dose    Accu-Chek Softclix Lancets lancets USE 1 LANCET TO GET BLOOD SAMPLE FOR BLOOD GLUCOSE TESTING FOUR TIMES DAILY 100 each 1 2024    acetaminophen (TYLENOL) 500 MG tablet Take 2 tablets by mouth Every 6 (Six) Hours As Needed for Mild Pain.   2024    Blood Glucose Monitoring Suppl (Accu-Chek Guide) w/Device kit USE 1 KIT TO CHECK BLOOD GLUCOSE   2024    docusate sodium (Colace) 100 MG capsule Take 1 capsule by mouth 2 (Two) Times a Day. 60 capsule 1 2024    fluticasone (CUTIVATE) 0.005 % ointment APPLY TO AFFECTED AREAS OF THE BODY TWICE DAILY   2024    glucose blood test strip Take blood sugar fasting (AM) and 2 hours after start of breakfast, lunch and dinner 100 each 12 2024    hydrocortisone 2.5 % ointment APPLY TO AFFECTED AREA OF FACE TWICE DAILY   2024    Insulin Glargine (LANTUS SOLOSTAR) 100 UNIT/ML injection pen Inject 16 units under the skin each AM and 20 units under the skin each evening 15 mL 3 2024    Insulin Pen Needle (BD Pen Needle Melanie 2nd Gen) 32G X 4 MM misc Use 1  each 2 (Two) Times a Day. 120 each 1 2024    ondansetron (Zofran) 4 MG tablet Take 1 tablet by mouth Every 8 (Eight) Hours As Needed for Nausea or Vomiting. 10 tablet 0 2024    prenatal vitamin (prenatal, CLASSIC, vitamin) tablet Take  by mouth Daily.   2024    ferrous sulfate 325 (65 FE) MG tablet Take 1 tablet by mouth Daily With Breakfast. 30 tablet 2     polyethylene glycol (MiraLax) 17 g packet Take 17 g by mouth Daily. 20 each 1      Current Meds:   [unfilled]  Allergies:  Allergies   Allergen Reactions    Other Rash     UTI medication (prescribed by hospital)    UTI medication (prescribed by hospital)   UTI medication (prescribed by hospital)     Immunizations:  Immunization History   Administered Date(s) Administered    COVID-19 (MODERNA) 1st,2nd,3rd Dose Monovalent 2021, 2022    Fluzone (or Fluarix & Flulaval for VFC) >6mos 10/28/2019, 10/30/2023    Hep A, 2 Dose 2018, 2019    Hpv9 2017, 2018, 2019    Meningococcal B,(Bexsero) 2018    Meningococcal MCV4P (Menactra) 2018    Tdap 2018     Social History:   Social History     Tobacco Use    Smoking status: Never     Passive exposure: Never    Smokeless tobacco: Never   Substance Use Topics    Alcohol use: Never      Family History:  History reviewed. No pertinent family history.     Review of Systems  A comprehensive review of systems was negative.    Objective:  tMax 24 hrs: Temp (24hrs), Av.2 °F (36.8 °C), Min:98.1 °F (36.7 °C), Max:98.3 °F (36.8 °C)    Vitals Ranges:   Temp:  [98.1 °F (36.7 °C)-98.3 °F (36.8 °C)] 98.3 °F (36.8 °C)  Heart Rate:  [] 78  Resp:  [14-16] 14  BP: (115-161)/(56-92) 144/76  Intake and Output Last 3 Shifts:   I/O last 3 completed shifts:  In: -   Out: 600 [Urine:600]    Exam:     General Appearance:    Alert, cooperative, no distress, appears stated age   Head:    Normocephalic, without obvious abnormality, atraumatic   Back:     Symmetric, no  curvature, ROM normal, no CVA tenderness   Lungs:     Clear to auscultation bilaterally, respirations unlabored   Chest Wall:    No tenderness or deformity    Heart:    Regular rate and rhythm, S1 and S2 normal, no murmur, rub   or gallop       Abdomen:   Soft, gravid.  There is no epigastric tenderness.  No right upper quadrant tenderness.  No fundal tenderness.   Genitalia:  Cervix was 60% effaced and 1 cm dilated.  Cook catheter placed without difficulty.   Rectal:  Not examined today   Extremities: Equal in size   Skin:   Skin color, texture, turgor normal, no rashes or lesions   Lymph nodes:   Cervical, supraclavicular, and axillary nodes normal       Data Review:    Lab Results (last 24 hours)       Procedure Component Value Units Date/Time    External VDRL [[id number]] Resulted: 12/20/23     Updated: 05/29/24 1121    Hepatitis C Antibody [[id number]] Resulted: 12/20/23     Updated: 05/29/24 1120    Rubella Antibody, IgG [[id number]] Resulted: 12/20/23     Updated: 05/29/24 1120    Rubella Antibody, IgG [[id number]] Resulted: 12/20/23     Updated: 05/29/24 1119    HIV-1 Antibody, EIA [[id number]] Resulted: 12/20/23    Specimen: Blood Updated: 05/29/24 1119    Hepatitis B Surface Antigen [[id number]] Resulted: 10/30/23    Specimen: Blood Updated: 05/29/24 1041     External Hepatitis B Surface Ag Negative    RPR [[id number]] Resulted: 10/30/23    Specimen: Blood Updated: 05/29/24 1041     External RPR Non-Reactive    Rubella Antibody, IgG [[id number]] Resulted: 10/30/23    Specimen: Blood Updated: 05/29/24 1041     External Rubella Qual Immune    HIV-1 Antibody, EIA [[id number]] Resulted: 10/30/23    Specimen: Blood Updated: 05/29/24 1041     External HIV Antibody Non-Reactive    Hepatitis C Antibody [[id number]] Resulted: 10/30/23    Specimen: Blood Updated: 05/29/24 1041     External Hepatitis C Ab non reactive    POC Glucose Once [[id number]]  (Normal) Collected: 05/29/24 1029    Specimen: Blood Updated: 05/29/24 1030      Glucose 126 mg/dL     POC Glucose Once [[id number]]  (Normal) Collected: 05/29/24 0627    Specimen: Blood Updated: 05/29/24 0628     Glucose 79 mg/dL     POC Glucose Once [[id number]]  (Normal) Collected: 05/29/24 0231    Specimen: Blood Updated: 05/29/24 0232     Glucose 82 mg/dL     POC Glucose Once [[id number]]  (Normal) Collected: 05/28/24 2228    Specimen: Blood Updated: 05/28/24 2229     Glucose 87 mg/dL     Comprehensive Metabolic Panel [[id number]]  (Abnormal) Collected: 05/28/24 1827    Specimen: Blood Updated: 05/28/24 1915     Glucose 98 mg/dL      BUN 11 mg/dL      Creatinine 0.68 mg/dL      Sodium 135 mmol/L      Potassium 3.6 mmol/L      Chloride 102 mmol/L      CO2 19.0 mmol/L      Calcium 8.8 mg/dL      Total Protein 6.8 g/dL      Albumin 3.5 g/dL      ALT (SGPT) 21 U/L      AST (SGOT) 14 U/L      Alkaline Phosphatase 189 U/L      Total Bilirubin 0.2 mg/dL      Globulin 3.3 gm/dL      A/G Ratio 1.1 g/dL      BUN/Creatinine Ratio 16.2     Anion Gap 14.0 mmol/L      eGFR 127.3 mL/min/1.73     Narrative:      GFR Normal >60  Chronic Kidney Disease <60  Kidney Failure <15      T Pallidum Antibody w/ reflex RPR (Syphilis) [[id number]]  (Normal) Collected: 05/28/24 1827    Specimen: Blood Updated: 05/28/24 1911     Treponemal AB Total Non-Reactive    CBC & Differential [[id number]]  (Abnormal) Collected: 05/28/24 1827    Specimen: Blood Updated: 05/28/24 1856    Narrative:      The following orders were created for panel order CBC & Differential.  Procedure                               Abnormality         Status                     ---------                               -----------         ------                     CBC Auto Differential[[id number]]        Abnormal            Final result                 Please view results for these tests on the individual orders.    CBC Auto Differential [[id number]]  (Abnormal) Collected: 05/28/24 1827    Specimen: Blood Updated: 05/28/24 1856     WBC 9.58 10*3/mm3       RBC 4.49 10*6/mm3      Hemoglobin 10.1 g/dL      Hematocrit 31.6 %      MCV 70.4 fL      MCH 22.5 pg      MCHC 32.0 g/dL      RDW 21.9 %      RDW-SD 52.1 fl      MPV 10.0 fL      Platelets 319 10*3/mm3      Neutrophil % 84.4 %      Lymphocyte % 7.9 %      Monocyte % 5.8 %      Eosinophil % 0.9 %      Basophil % 0.1 %      Immature Grans % 0.9 %      Neutrophils, Absolute 8.07 10*3/mm3      Lymphocytes, Absolute 0.76 10*3/mm3      Monocytes, Absolute 0.56 10*3/mm3      Eosinophils, Absolute 0.09 10*3/mm3      Basophils, Absolute 0.01 10*3/mm3      Immature Grans, Absolute 0.09 10*3/mm3           Assessment:    Pregnancy      1.  Intrauterine pregnancy at 37-6/7 weeks  2.  Insulin requiring gestational diabetes    Plan:  Induction of labor.  Group B strep status is negative.  Fetal heart tones are reactive.  Cook catheter has been placed.  Anticipate a vaginal delivery.    Nelson Moeller MD  5/29/2024

## 2024-05-29 NOTE — ANESTHESIA PREPROCEDURE EVALUATION
Anesthesia Evaluation     no history of anesthetic complications:                Airway   Neck ROM: full  no difficulty expected  Dental - normal exam     Pulmonary - negative pulmonary ROS and normal exam   (-) COPD, asthma, sleep apnea, not a smoker    PE comment: nonlabored  Cardiovascular - normal exam  Exercise tolerance: good (4-7 METS)    Rhythm: regular  Rate: normal    (+) hypertension  (-) valvular problems/murmurs, past MI, CAD, dysrhythmias, angina      Neuro/Psych  (+) psychiatric history Anxiety  (-) seizures, TIA, CVA  GI/Hepatic/Renal/Endo    (+) diabetes mellitus gestational using insulin  (-) GERD, liver disease, no renal disease, no thyroid disorder    Musculoskeletal (-) negative ROS        ROS comment: H/o spinal fracture secondary to fall from window--spinal fusion with instrumentation  Abdominal    Substance History      OB/GYN    (+) Pregnant, Preeclampsia, pregnancy induced hypertension        Other                      Anesthesia Plan    ASA 3     epidural       Anesthetic plan, risks, benefits, and alternatives have been provided, discussed and informed consent has been obtained with: patient.    CODE STATUS:    Level Of Support Discussed With: Patient  Code Status (Patient has no pulse and is not breathing): CPR (Attempt to Resuscitate)  Medical Interventions (Patient has pulse or is breathing): Full Support

## 2024-05-29 NOTE — TELEPHONE ENCOUNTER
Caller: Abdon Andres    Relationship to patient: Self    Best call back number: [phone] (home)      APPTS FOR TODAY NEED TO BE CANCELLED, PT IS CURRENTLY BEING INDUCED.        UNABLE TO WT CALL

## 2024-05-29 NOTE — ANESTHESIA PROCEDURE NOTES
Labor Epidural      Patient reassessed immediately prior to procedure    Patient location during procedure: OB  Indication:at surgeon's request  Performed By  Anesthesiologist: Jani Jiang MD  Preanesthetic Checklist  Completed: patient identified, IV checked, site marked, risks and benefits discussed, surgical consent, monitors and equipment checked, pre-op evaluation and timeout performed  Additional Notes  Connected epidural catheter to PCEA and detailed instructions given to patient for usage of PCEA pump.  Prep:  Pt Position:sitting  Sterile Tech:cap, gloves, mask and sterile barrier  Prep:chlorhexidine gluconate and isopropyl alcohol  Monitoring:blood pressure monitoring and EKG  Epidural Block Procedure:  Approach:midline  Guidance:landmark technique and palpation technique  Location:L4-L5  Needle Type:Tuohy  Needle Gauge:17 G  Loss of Resistance Medium: saline  Loss of Resistance: 6cm  Cath Depth at skin:11 cm  Paresthesia: none  Aspiration:negative  Test Dose:negative  Number of Attempts: 1  Post Assessment:  Dressing:occlusive dressing applied and secured with tape  Pt Tolerance:patient tolerated the procedure well with no apparent complications  Complications:no

## 2024-05-29 NOTE — PLAN OF CARE
Problem: Adult Inpatient Plan of Care  Goal: Plan of Care Review  Outcome: Ongoing, Progressing  Flowsheets (Taken 5/29/2024 0632)  Progress: improving  Plan of Care Reviewed With: patient  Outcome Evaluation: Pt. admitted to L&D for IOL at 37 wks and 5 days for GDM. Cervidil placed at 1935 for cervical ripening. Vital signs and blood sugars stable. Pt. plans for vaginal delivery. Pt. plans to use Nitrous for pain control, but may consider epidural.  Goal: Patient-Specific Goal (Individualized)  Outcome: Ongoing, Progressing  Flowsheets (Taken 5/29/2024 0632)  Patient-Specific Goals (Include Timeframe): Healthy mom and baby by discharge  Individualized Care Needs: Accuchecks and insulin as needed. Breastfeeding help.  Anxieties, Fears or Concerns: First baby. Breastfeeding. Pain control.  Goal: Absence of Hospital-Acquired Illness or Injury  Outcome: Ongoing, Progressing  Intervention: Identify and Manage Fall Risk  Recent Flowsheet Documentation  Taken 5/29/2024 0330 by Evelyn Delgado RN  Safety Promotion/Fall Prevention: safety round/check completed  Taken 5/28/2024 2330 by Evelyn Delgado RN  Safety Promotion/Fall Prevention: safety round/check completed  Taken 5/28/2024 1930 by Evelyn Delgado RN  Safety Promotion/Fall Prevention: safety round/check completed  Goal: Optimal Comfort and Wellbeing  Outcome: Ongoing, Progressing  Intervention: Provide Person-Centered Care  Recent Flowsheet Documentation  Taken 5/28/2024 1930 by Evelyn Delgado RN  Trust Relationship/Rapport:   care explained   choices provided   emotional support provided   empathic listening provided   questions answered   questions encouraged   reassurance provided   thoughts/feelings acknowledged  Goal: Readiness for Transition of Care  Outcome: Ongoing, Progressing     Problem: Bleeding (Labor)  Goal: Hemostasis  Outcome: Ongoing, Progressing     Problem: Change in Fetal Wellbeing (Labor)  Goal: Stable Fetal Wellbeing  Outcome: Ongoing,  Progressing     Problem: Delayed Labor Progression (Labor)  Goal: Effective Progression to Delivery  Outcome: Ongoing, Progressing     Problem: Infection (Labor)  Goal: Absence of Infection Signs and Symptoms  Outcome: Ongoing, Progressing     Problem: Labor Pain (Labor)  Goal: Acceptable Pain Control  Outcome: Ongoing, Progressing     Problem: Uterine Tachysystole (Labor)  Goal: Normal Uterine Contraction Pattern  Outcome: Ongoing, Progressing     Problem:  Fall Injury Risk  Goal: Absence of Fall, Infant Drop and Related Injury  Outcome: Ongoing, Progressing  Intervention: Promote Injury-Free Environment  Recent Flowsheet Documentation  Taken 2024 0330 by Evelyn Delgado, RN  Safety Promotion/Fall Prevention: safety round/check completed  Taken 2024 2330 by Evelyn Delgado, RN  Safety Promotion/Fall Prevention: safety round/check completed  Taken 2024 1930 by Evelyn Delgado RN  Safety Promotion/Fall Prevention: safety round/check completed     Problem: Diabetes in Pregnancy  Goal: Blood Glucose Level Within Targeted Range  Outcome: Ongoing, Progressing   Goal Outcome Evaluation:  Plan of Care Reviewed With: patient        Progress: improving  Outcome Evaluation: Pt. admitted to L&D for IOL at 37 wks and 5 days for GDM. Cervidil placed at 1935 for cervical ripening. Vital signs and blood sugars stable. Pt. plans for vaginal delivery. Pt. plans to use Nitrous for pain control, but may consider epidural.

## 2024-05-30 LAB
BASOPHILS # BLD AUTO: 0.03 10*3/MM3 (ref 0–0.2)
BASOPHILS NFR BLD AUTO: 0.3 % (ref 0–1.5)
DEPRECATED RDW RBC AUTO: 54 FL (ref 37–54)
EOSINOPHIL # BLD AUTO: 0.06 10*3/MM3 (ref 0–0.4)
EOSINOPHIL NFR BLD AUTO: 0.6 % (ref 0.3–6.2)
ERYTHROCYTE [DISTWIDTH] IN BLOOD BY AUTOMATED COUNT: 22.1 % (ref 12.3–15.4)
GLUCOSE BLDC GLUCOMTR-MCNC: 85 MG/DL (ref 70–130)
GLUCOSE P FAST SERPL-MCNC: 165 MG/DL (ref 74–106)
GLUCOSE P FAST SERPL-MCNC: 75 MG/DL (ref 74–106)
HCT VFR BLD AUTO: 31.9 % (ref 34–46.6)
HGB BLD-MCNC: 9.9 G/DL (ref 12–15.9)
IMM GRANULOCYTES # BLD AUTO: 0.09 10*3/MM3 (ref 0–0.05)
IMM GRANULOCYTES NFR BLD AUTO: 0.8 % (ref 0–0.5)
LYMPHOCYTES # BLD AUTO: 0.78 10*3/MM3 (ref 0.7–3.1)
LYMPHOCYTES NFR BLD AUTO: 7.3 % (ref 19.6–45.3)
MCH RBC QN AUTO: 22.2 PG (ref 26.6–33)
MCHC RBC AUTO-ENTMCNC: 31 G/DL (ref 31.5–35.7)
MCV RBC AUTO: 71.5 FL (ref 79–97)
MONOCYTES # BLD AUTO: 0.81 10*3/MM3 (ref 0.1–0.9)
MONOCYTES NFR BLD AUTO: 7.6 % (ref 5–12)
NEUTROPHILS NFR BLD AUTO: 8.85 10*3/MM3 (ref 1.7–7)
NEUTROPHILS NFR BLD AUTO: 83.4 % (ref 42.7–76)
PLATELET # BLD AUTO: 267 10*3/MM3 (ref 140–450)
PMV BLD AUTO: 9.7 FL (ref 6–12)
RBC # BLD AUTO: 4.46 10*6/MM3 (ref 3.77–5.28)
WBC NRBC COR # BLD AUTO: 10.62 10*3/MM3 (ref 3.4–10.8)

## 2024-05-30 PROCEDURE — 82947 ASSAY GLUCOSE BLOOD QUANT: CPT | Performed by: OBSTETRICS & GYNECOLOGY

## 2024-05-30 PROCEDURE — 85025 COMPLETE CBC W/AUTO DIFF WBC: CPT | Performed by: OBSTETRICS & GYNECOLOGY

## 2024-05-30 PROCEDURE — 82948 REAGENT STRIP/BLOOD GLUCOSE: CPT

## 2024-05-30 PROCEDURE — 0503F POSTPARTUM CARE VISIT: CPT

## 2024-05-30 RX ORDER — FERROUS SULFATE 325(65) MG
325 TABLET ORAL
Status: DISCONTINUED | OUTPATIENT
Start: 2024-05-30 | End: 2024-05-31 | Stop reason: HOSPADM

## 2024-05-30 RX ADMIN — DOCUSATE SODIUM 100 MG: 100 CAPSULE, LIQUID FILLED ORAL at 20:50

## 2024-05-30 RX ADMIN — DOCUSATE SODIUM 100 MG: 100 CAPSULE, LIQUID FILLED ORAL at 07:46

## 2024-05-30 RX ADMIN — IBUPROFEN 600 MG: 600 TABLET, FILM COATED ORAL at 20:50

## 2024-05-30 RX ADMIN — Medication 1 TABLET: at 07:46

## 2024-05-30 RX ADMIN — FERROUS SULFATE TAB 325 MG (65 MG ELEMENTAL FE) 325 MG: 325 (65 FE) TAB at 18:18

## 2024-05-30 RX ADMIN — HYDROCODONE BITARTRATE AND ACETAMINOPHEN 1 TABLET: 5; 325 TABLET ORAL at 07:46

## 2024-05-30 RX ADMIN — HYDROCODONE BITARTRATE AND ACETAMINOPHEN 1 TABLET: 5; 325 TABLET ORAL at 17:05

## 2024-05-30 NOTE — L&D DELIVERY NOTE
Delivery Note    Obstetrician:   Nelson Moeller MD      Pre-Delivery Diagnosis: 1.  Intrauterine pregnancy at 37-6/7 weeks  2.  Gestational diabetes, A2    Post-Delivery Diagnosis: Same    Procedure: Spontaneous vaginal delivery     Episiotomy or Incision: none  23-year-old  1 para 0 presents at 37-6/7 weeks for an induction of labor.  Her pregnancy was complicated by insulin requiring gestational diabetes.  Group B strep cultures were negative.      The patient was admitted.  Cervical ripening was undertaken with Cervidil.  The next morning, the cervix was 1 to 2 cm dilated.  At this point, I recommended a Cook catheter.  I counseled the patient and answered her questions.  She agreed with this recommendation.  Cook catheter was placed.      After approximately 4 hours, the patient felt vaginal pressure.  The Cook catheter had passed into the vagina and the cervix was 4 to 5 cm dilated.  Amniotomy was performed.  An epidural catheter was placed for pain control and labor was augmented with Pitocin.      The patient progressed along an adequate labor curve to complete effacement and dilation as well as +2 station of the presenting part.  She then began to push.  She pushed a spontaneous vaginal delivery of the fetal head from direct occiput anterior presentation.  Nuchal cord x 2 was reduced without difficulty.  The mouth and naris were suctioned with a bulb while on the perineum.      The shoulders were delivered without difficulty, followed by the remainder of the infant.  After delay of 30 seconds, the cord was doubly clamped and divided.  The infant was then placed on the mother's chest for Kangaroo care.  This is a vigorous female .  Apgars of 8 and 8 were assigned.      The perineum was inspected.  There were no perineal lacerations, but there was a small periurethral laceration which was bleeding.  I elected to repair this.  3-0 chromic in a figure-of-eight fashion was used to repair the  laceration.      The placenta  spontaneously.  It was intact and had a three-vessel cord.  The perineum was inspected.  It was intact.  A red rubber catheter was used to drain the bladder.  The urethra was intact and not compromised by the repair of the periurethral laceration.      All counts were correct.              Apgars: 1' 8  /  5' 8     Placenta and Cord:          Mechanism: spontaneous        Description:  complete    Estimated Blood Loss:   77cc                             Complications:  None           Condition: Stable    Female       2024  Nelson Moeller MD

## 2024-05-30 NOTE — PROGRESS NOTES
VAGINAL DELIVERY POSTPARTUM DAY 1    5/30/2024  PATIENT: Abdon Andres        MR#:[id number]  LOCATION: [facility]  DATE OF ADMISSION: 5/28/2024  ADMISSION  DIAGNOSIS:   Pregnancy     CURRENT DIAGNOSIS: No notes have been filed under this hospital service.  Service: Hospitalist      SUBJECTIVE     Abdon feels well.  Patient describes her lochia as less than menses.  Pain is well controlled.        OBJECTIVE   Temp: Temp:  [97.3 °F (36.3 °C)-98.6 °F (37 °C)] 97.8 °F (36.6 °C) Temp src: Oral   BP: BP: ()/(45-85) 118/74        Pulse: Heart Rate:  [] 91  RR: Resp:  [14-16] 16    General:  Awake, alert, no acute distress   Cardiac: Regular rate and rhythm    Respiratory: Lungs clear bilaterally, normal respiratory effort    Abdomen: Soft, non-distended, fundus firm, below umbilicus, appropriately tender   Pelvis: deferred   Extremities: Calves NT bilaterally, DTR 2+, no clonus noted, trace edema     Lab Results   Component Value Date    WBC 10.62 05/30/2024    HGB 9.9 (L) 05/30/2024    HCT 31.9 (L) 05/30/2024     05/30/2024    AST 14 05/28/2024    ALT 21 05/28/2024    URICACID 4.8 05/16/2024    CREATININE 0.68 05/28/2024     05/16/2024       ASSESSMENT:  Postpartum day 1 after vaginal delivery. Hgb: 9.9.    PLAN:Doing well. Add PO ferrous sulfate to daily medications for hgb 9.9. Continue routine supportive care. Discontinue IV, advance diet, may shower. Plan for discharge tomorrow as clinical picture allows.     Eliz Quiroz CNM  10:27 EDT  May 30, 2024

## 2024-05-30 NOTE — LACTATION NOTE
This note was copied from a baby's chart.  Informed PT, LC is available to help with BF until 2100. Mom reports baby is BF well and actually she had baby on the left breast at this time. Latch assessed and it looks like baby is BF deep. Mom denies any pain. PT denieis any questions. Encouraged to call if needing BF help

## 2024-05-30 NOTE — LACTATION NOTE
This note was copied from a baby's chart.  P1, 37/6. GDM - insulin controlled. New admission. Baby is swaddled in crib sleeping. Mom has bf once since delivery and describes a strong latch. Mom is planning to rest now.     Reviewed bf education: ways to wake baby- STS, suck training, stimulation, HE colostrum.  Attempt feeding every 2-3 hours and when baby is alert, feeding cues present. Call before feeds for glucose check.  Normal  behavior including sleepiness- HE  or pump and give ebm if no latch achieved.  Proper way to position and steps for an effective latch,check nipple shape after feeds.   Weight and output expectations.  Infant stomach size  Full milk supply day 3-5.  Nipple care. Lanolin given with instructions.  Review Postpartum handbook pages 35-45, [facility].  Call LC for assistance. # on WB.  Has PBP.

## 2024-05-30 NOTE — ANESTHESIA POSTPROCEDURE EVALUATION
"Patient: Abdon Andres    Procedure Summary       Date: 05/29/24 Room / Location:     Anesthesia Start: 1605 Anesthesia Stop: 2017    Procedure: LABOR ANALGESIA Diagnosis:     Scheduled Providers:  Provider: Jani Jiang MD    Anesthesia Type: epidural ASA Status: 3            Anesthesia Type: epidural    Vitals  Vitals Value Taken Time   /125 05/29/24 2030   Temp 36.7 °C (98.1 °F) 05/29/24 1625   Pulse 102 05/29/24 2030   Resp     SpO2 100 % 05/29/24 2015   Vitals shown include unfiled device data.        Post Anesthesia Care and Evaluation    Anesthetic complications: No anesthetic complications    Comments: /66   Pulse 99   Temp 36.7 °C (98.1 °F) (Oral)   Resp 14   Ht 174 cm (68.5\")   Wt 96.3 kg (212 lb 3.2 oz)   LMP 09/07/2023   SpO2 100%   Breastfeeding Yes   BMI 31.79 kg/m²     No anesthesia complications reported to me.    "

## 2024-05-30 NOTE — LACTATION NOTE
No feeding cues present. Changed a wet and meconium plug with a small amount of stool. HP set up with directions on use, cleaning and syringe/finger feeding any ebm. 27 mm flange. Observed pumping, several drops present. Educated mom about looking for feeding cues and calling for glucose check before feeds. Family will bring PBP in tomorrow.

## 2024-05-30 NOTE — PLAN OF CARE
Problem: Adult Inpatient Plan of Care  Goal: Plan of Care Review  Outcome: Ongoing, Progressing  Flowsheets (Taken 2024)  Progress: improving  Plan of Care Reviewed With: patient  Outcome Evaluation: Pt. admitted to L&D for IOL for GDM. Epidural placed for pain control. Pt. complete at 1916. Pt. pushed for about 40 minutes. Vaginal delivery at 2017. Pt. attemtping to breastfeed. Mom and baby bonding well. Mom and baby to be transferred to [facility] following recovery. Vital signs and bleeding stable.  Goal: Patient-Specific Goal (Individualized)  Outcome: Ongoing, Progressing  Flowsheets (Taken 2024)  Patient-Specific Goals (Include Timeframe): Healthy mom and baby by discharge  Individualized Care Needs: Breastfeeding  Anxieties, Fears or Concerns: First baby  Goal: Absence of Hospital-Acquired Illness or Injury  Outcome: Ongoing, Progressing  Intervention: Identify and Manage Fall Risk  Recent Flowsheet Documentation  Taken 2024 by Evelyn Delgado RN  Safety Promotion/Fall Prevention: safety round/check completed  Intervention: Prevent and Manage VTE (Venous Thromboembolism) Risk  Recent Flowsheet Documentation  Taken 2024 by Evelyn Delgado RN  Range of Motion: active ROM (range of motion) encouraged  Goal: Optimal Comfort and Wellbeing  Outcome: Ongoing, Progressing  Intervention: Provide Person-Centered Care  Recent Flowsheet Documentation  Taken 2024 by Evelyn Delgado RN  Trust Relationship/Rapport:   care explained   choices provided   emotional support provided   empathic listening provided   questions answered   questions encouraged   reassurance provided   thoughts/feelings acknowledged  Goal: Readiness for Transition of Care  Outcome: Ongoing, Progressing     Problem:  Fall Injury Risk  Goal: Absence of Fall, Infant Drop and Related Injury  Outcome: Ongoing, Progressing  Intervention: Promote Injury-Free Environment  Recent Flowsheet  Documentation  Taken 5/29/2024 2100 by Evelyn Delgado, RN  Safety Promotion/Fall Prevention: safety round/check completed     Problem: Skin Injury Risk Increased  Goal: Skin Health and Integrity  Outcome: Ongoing, Progressing     Problem: Adjustment to Role Transition (Postpartum Vaginal Delivery)  Goal: Successful Maternal Role Transition  Outcome: Ongoing, Progressing     Problem: Bleeding (Postpartum Vaginal Delivery)  Goal: Hemostasis  Outcome: Ongoing, Progressing     Problem: Infection (Postpartum Vaginal Delivery)  Goal: Absence of Infection Signs/Symptoms  Outcome: Ongoing, Progressing     Problem: Pain (Postpartum Vaginal Delivery)  Goal: Acceptable Pain Control  Outcome: Ongoing, Progressing     Problem: Urinary Retention (Postpartum Vaginal Delivery)  Goal: Effective Urinary Elimination  Outcome: Ongoing, Progressing   Goal Outcome Evaluation:  Plan of Care Reviewed With: patient        Progress: improving  Outcome Evaluation: Pt. admitted to L&D for IOL for GDM. Epidural placed for pain control. Pt. complete at 1916. Pt. pushed for about 40 minutes. Vaginal delivery at 2017. Pt. attemtping to breastfeed. Mom and baby bonding well. Mom and baby to be transferred to [facility] following recovery. Vital signs and bleeding stable.

## 2024-05-30 NOTE — LACTATION NOTE
Baby wakens easily but is reluctant to latch, She has gagged several times so mom will place STS and try burping her. Asked her to call LC if baby does not latch in 20 minutes.

## 2024-05-30 NOTE — PLAN OF CARE
Goal Outcome Evaluation:           Progress: improving  Outcome Evaluation: vitals stable, assessment wdl, up ad sneha, pain controlled per po meds, iv out

## 2024-05-31 VITALS
HEIGHT: 69 IN | DIASTOLIC BLOOD PRESSURE: 70 MMHG | BODY MASS INDEX: 31.43 KG/M2 | OXYGEN SATURATION: 99 % | RESPIRATION RATE: 18 BRPM | WEIGHT: 212.2 LBS | SYSTOLIC BLOOD PRESSURE: 109 MMHG | TEMPERATURE: 98 F | HEART RATE: 80 BPM

## 2024-05-31 LAB
GLUCOSE BLDC GLUCOMTR-MCNC: 113 MG/DL (ref 70–130)
GLUCOSE BLDC GLUCOMTR-MCNC: 83 MG/DL (ref 70–130)
GLUCOSE P FAST SERPL-MCNC: 85 MG/DL (ref 74–106)

## 2024-05-31 PROCEDURE — 0503F POSTPARTUM CARE VISIT: CPT

## 2024-05-31 PROCEDURE — 82947 ASSAY GLUCOSE BLOOD QUANT: CPT | Performed by: OBSTETRICS & GYNECOLOGY

## 2024-05-31 PROCEDURE — 82948 REAGENT STRIP/BLOOD GLUCOSE: CPT

## 2024-05-31 RX ORDER — FERROUS SULFATE 325(65) MG
325 TABLET ORAL
Qty: 30 TABLET | Refills: 2 | OUTPATIENT
Start: 2024-05-31

## 2024-05-31 RX ORDER — IBUPROFEN 600 MG/1
600 TABLET ORAL EVERY 6 HOURS PRN
Qty: 60 TABLET | Refills: 0 | OUTPATIENT
Start: 2024-05-31

## 2024-05-31 RX ORDER — IBUPROFEN 600 MG/1
600 TABLET ORAL EVERY 6 HOURS PRN
Qty: 60 TABLET | Refills: 0 | Status: SHIPPED | OUTPATIENT
Start: 2024-05-31

## 2024-05-31 RX ORDER — BENZOCAINE/MENTHOL 6 MG-10 MG
1 LOZENGE MUCOUS MEMBRANE EVERY 12 HOURS SCHEDULED
Status: DISCONTINUED | OUTPATIENT
Start: 2024-05-31 | End: 2024-05-31 | Stop reason: HOSPADM

## 2024-05-31 RX ORDER — DOCUSATE SODIUM 100 MG/1
100 CAPSULE, LIQUID FILLED ORAL 2 TIMES DAILY
Qty: 60 CAPSULE | Refills: 1 | OUTPATIENT
Start: 2024-05-31

## 2024-05-31 RX ADMIN — IBUPROFEN 600 MG: 600 TABLET, FILM COATED ORAL at 13:30

## 2024-05-31 RX ADMIN — DOCUSATE SODIUM 100 MG: 100 CAPSULE, LIQUID FILLED ORAL at 09:00

## 2024-05-31 RX ADMIN — Medication 1 TABLET: at 09:01

## 2024-05-31 RX ADMIN — HYDROCORTISONE 1 APPLICATION: 1 CREAM TOPICAL at 13:30

## 2024-05-31 RX ADMIN — FERROUS SULFATE TAB 325 MG (65 MG ELEMENTAL FE) 325 MG: 325 (65 FE) TAB at 09:00

## 2024-05-31 RX ADMIN — IBUPROFEN 600 MG: 600 TABLET, FILM COATED ORAL at 04:57

## 2024-05-31 NOTE — LACTATION NOTE
This note was copied from a baby's chart.  LC assisted mom with latching baby to both breasts. Baby is latching well with strong tugs on both breasts. Mom is having some uterine cramping. She plans to BF in NICU every 3 hours and has hand pump in room. Educated on deep latching and positioning. Encouraged to call LC as needed.        Lactation Consult Note    Evaluation Completed: 2024 11:54 EDT  Patient Name: Adrien Andres  :  [birth year]24  MRN:  [id number]     REFERRAL  INFORMATION:                                         DELIVERY HISTORY:  This patient has no babies on file.  This patient has no babies on file.  Skin to skin initiation date/time: 2024 8:20 PM  Skin to skin end date/time: 2024 8:40 PM  This patient has no babies on file.    MATERNAL ASSESSMENT:                               INFANT ASSESSMENT:  This patient has no babies on file.  This patient has no babies on file.  This patient has no babies on file.  This patient has no babies on file.  This patient has no babies on file.  This patient has no babies on file.  This patient has no babies on file.  This patient has no babies on file.  This patient has no babies on file.  This patient has no babies on file.  This patient has no babies on file.  This patient has no babies on file.  This patient has no babies on file.  This patient has no babies on file.  This patient has no babies on file.  This patient has no babies on file.  This patient has no babies on file.  This patient has no babies on file.  This patient has no babies on file.  This patient has no babies on file.      This patient has no babies on file.  This patient has no babies on file.  This patient has no babies on file.  This patient has no babies on file.  This patient has no babies on file.  This patient has no babies on file.    This patient has no babies on file.  This patient has no babies on file.  This patient has no babies on file.        MATERNAL INFANT  FEEDING:                                                                       EQUIPMENT TYPE:                                 BREAST PUMPING:          LACTATION REFERRALS:

## 2024-05-31 NOTE — DISCHARGE SUMMARY
[facility]         DISCHARGE SUMMARY- VAGINAL DELIVERY    Patient Name: Abdon Andres  : 2000  MRN: [id number]    Date of Admission: 2024  Date of Discharge:  24   Primary Care Physician: Provider, No Known    Consults       No orders found from 2024 to 2024.            Presenting Problem:   Pregnancy [Z34.90]    Active and Resolved Hospital Problems:  Active Hospital Problems    Diagnosis POA    **Normal vaginal delivery [O80] Not Applicable    Pre-eclampsia in third trimester, without severe features [O14.93] Yes    Insulin controlled gestational diabetes mellitus (GDM) in third trimester [O24.414] Yes      Resolved Hospital Problems   No resolved problems to display.         Hospital Course     Hospital Course:  Abdon Andres is a 23 y.o. female  who presented at 37w6d with plan for induction of labor for GDMA2, preeclampsia without severe features. She underwent cervical ripening and then a cook catheter.  AROM was performed and she received an epidural for pain control. She then had a spontaneous vaginal delivery, please see delivery note for details. Patient's postpartum course was complicated by postpartum anemia for which she was asymptomatic.  Her blood sugars were normal postpartum and BP improved.          Day of Discharge     Vital Signs:  Temp:  [97.4 °F (36.3 °C)-98 °F (36.7 °C)] 98 °F (36.7 °C)  Heart Rate:  [69-87] 80  Resp:  [16-18] 18  BP: (108-121)/(59-74) 109/70  Physical Exam:  See note from date of discharge    Pertinent  and/or Most Recent Results     LAB RESULTS:      Lab 24  0653 24  1827 24  0250   WBC 10.62 9.58 8.35   HEMOGLOBIN 9.9* 10.1* 9.8*   HEMATOCRIT 31.9* 31.6* 31.0*   PLATELETS 267 319 280   NEUTROS ABS 8.85* 8.07* 6.53   IMMATURE GRANS (ABS) 0.09* 0.09* 0.06*   LYMPHS ABS 0.78 0.76 0.95   MONOS ABS 0.81 0.56 0.65   EOS ABS 0.06 0.09 0.13   MCV 71.5* 70.4* 72.4*         Lab 24  1827  05/26/24  0250   SODIUM 135* 138   POTASSIUM 3.6 3.8   CHLORIDE 102 105   CO2 19.0* 20.6*   ANION GAP 14.0 12.4   BUN 11 7   CREATININE 0.68 0.55*   EGFR 127.3 133.9   GLUCOSE 98 102*   CALCIUM 8.8 9.2         Lab 05/28/24 1827 05/26/24 0250   TOTAL PROTEIN 6.8 6.3   ALBUMIN 3.5 3.3*   GLOBULIN 3.3 3.0   ALT (SGPT) 21 20   AST (SGOT) 14 15   BILIRUBIN 0.2 0.2   ALK PHOS 189* 181*                 Lab 05/28/24 1827   ABO TYPING B   RH TYPING Positive   ANTIBODY SCREEN Negative         Brief Urine Lab Results  (Last result in the past 365 days)        Color   Clarity   Blood   Leuk Est   Nitrite   Protein   CREAT   Urine HCG        05/26/24 0251             61.7         05/26/24 0251 Yellow   Cloudy   Negative   Trace   Negative   Negative                 Microbiology Results (last 10 days)       Procedure Component Value - Date/Time    Urine Culture - Urine, Urine, Clean Catch [[id number]]  (Normal) Collected: 05/26/24 0251    Lab Status: Final result Specimen: Urine, Clean Catch Updated: 05/27/24 0949     Urine Culture No growth                             Labs Pending at Discharge:        Discharge Details        Discharge Medications        New Medications        Instructions Start Date   ibuprofen 600 MG tablet  Commonly known as: ADVIL,MOTRIN   600 mg, Oral, Every 6 Hours PRN             Continue These Medications        Instructions Start Date   Accu-Chek Guide w/Device kit   USE 1 KIT TO CHECK BLOOD GLUCOSE      Accu-Chek Softclix Lancets lancets   USE 1 LANCET TO GET BLOOD SAMPLE FOR BLOOD GLUCOSE TESTING FOUR TIMES DAILY      BD Pen Needle Melanie 2nd Gen 32G X 4 MM misc  Generic drug: Insulin Pen Needle   1 each, Does not apply, 2 Times Daily      docusate sodium 100 MG capsule  Commonly known as: Colace   100 mg, Oral, 2 Times Daily      ferrous sulfate 325 (65 FE) MG tablet   325 mg, Oral, Daily With Breakfast      prenatal (CLASSIC) vitamin 28-0.8 MG tablet tablet  Generic drug: prenatal vitamin   Oral,  Daily             Stop These Medications      acetaminophen 500 MG tablet  Commonly known as: TYLENOL     fluticasone 0.005 % ointment  Commonly known as: CUTIVATE     glucose blood test strip     hydrocortisone 2.5 % ointment     Insulin Glargine 100 UNIT/ML injection pen  Commonly known as: LANTUS SOLOSTAR     ondansetron 4 MG tablet  Commonly known as: Zofran     polyethylene glycol 17 g packet  Commonly known as: MiraLax              Allergies   Allergen Reactions    Other Rash     UTI medication (prescribed by hospital)    UTI medication (prescribed by hospital)   UTI medication (prescribed by hospital)         Discharge Disposition:  Home or Self Care    Diet:  Hospital:  Diet Order   Procedures    Diet: Regular/House; Fluid Consistency: Thin (IDDSI 0)         Discharge Activity:   Activity Instructions       Driving Restrictions      No driving while too sore to twist or push break.  Encourage to not drive for at least 4 weeks following  section    Type of Restriction: Driving    Driving Restrictions: No Driving While Taking Narcotics    Pelvic Rest      6 weeks              CODE STATUS:  Code Status and Medical Interventions:   Ordered at: 24 0683     Code Status (Patient has no pulse and is not breathing):    CPR (Attempt to Resuscitate)     Medical Interventions (Patient has pulse or is breathing):    Full         No future appointments.    Additional Instructions for the Follow-ups that You Need to Schedule       Call MD With Problems / Concerns   As directed      Instructions: Discharge instructions reviewed including but not limited to: Preeclampsia precautions (return with headache, visual changes, right upper quadrant pain, concern for elevated blood pressures, other concerning symptoms), infection precautions (redness around incision, purulent drainage, temp of 100.4 or greater), return with any chest pain, shortness of breath at rest, unilateral leg swelling or calf pain, or other  concerning signs or symptoms.  Patient was instructed to monitor mood and that if she feels symptoms of depression she should seek medical attention.  If thoughts of harming self or others arise, she should be seen immediately by a medical professional.    Order Comments: Instructions: Discharge instructions reviewed including but not limited to: Preeclampsia precautions (return with headache, visual changes, right upper quadrant pain, concern for elevated blood pressures, other concerning symptoms), infection precautions (redness around incision, purulent drainage, temp of 100.4 or greater), return with any chest pain, shortness of breath at rest, unilateral leg swelling or calf pain, or other concerning signs or symptoms.  Patient was instructed to monitor mood and that if she feels symptoms of depression she should seek medical attention.  If thoughts of harming self or others arise, she should be seen immediately by a medical professional.         Discharge Follow-up with Specified Provider: OBGYN; 6 Weeks   As directed      To: TAJ   Follow Up: 6 Weeks                Emily Delgado MD

## 2024-05-31 NOTE — PLAN OF CARE
Goal Outcome Evaluation:  Plan of Care Reviewed With: patient           Outcome Evaluation: VSS. Up ad sneha. Bonding well with baby in NICU. Visits frequently. Pain controlled with minimal pain medication. D/C today, boarding.

## 2024-05-31 NOTE — PROGRESS NOTES
"Discharge Planning Assessment  [facility]     Patient Name: Abdon Andres  MRN: [id number]  Today's Date: 2024    Admit Date: 2024    Plan: Infant may discharge to mother when medically ready. JUDY Pierce.   Discharge Needs Assessment    No documentation.                  Discharge Plan       Row Name 24 1341       Plan    Plan Infant may discharge to mother when medically ready. JUDY Pierce.    Plan Comments Mother: Abdon Andres, MRN: [id number]; infant: Anabelrmarkos \"Briana\" Mckenna, MRN: [id number]. CSW consulted for \"NICU admission.\" Of note, no toxicology screens were ordered for mother or infant as need was not warranted at this time. CSW met with mother alone at bedside. Mother verified address, phone number, and insurance. Mother reports she understands the process of adding infant to health insurance. Mother reports having a car seat, crib/bassinet, clothes, and diapers for infant. This is mother and father's first baby. Mother reports, maternal grandma, paternal grandma, maternal sisters, father of infant/SO, and other family members are available for support as needed. Mother reports [facility] is [facility] after discharge; mother is comfortable scheduling appointments for infant and has reliable transportation. Mother is not current with [facility] but is familiar with the program. Mother denies any violence, threats, or feeling unsafe at home or relationship. CSW provided mother with a packet of resources including: WIC, HANDS, transportation, infant supplies, counseling, online support groups, postpartum mood and anxiety resources, NICU parent resources, and general community resources. CSW spent time building rapport with mother, and offered validation, support, and encouragement to mother throughout assessment. Mother was polite and appropriate, and denied having unmet needs or concerns at this time. CSW will remain available for " psychosocial needs while infant is in the NICU. JUDY Pierce.                  Continued Care and Services - Admitted Since 5/28/2024    No active coordination exists for this encounter.          Demographic Summary       Row Name 05/31/24 1340       General Information    Admission Type inpatient    Arrived From home    Referral Source nursing    Reason for Consult other (see comments)    General Information Comments NICU admission.                   Functional Status       Row Name 05/31/24 1340       Functional Status, IADL    Medications independent    Meal Preparation independent    Housekeeping independent    Laundry independent    Shopping independent       Mental Status    General Appearance WDL WDL       Mental Status Summary    Recent Changes in Mental Status/Cognitive Functioning no changes       Employment/    Employment Status employed full-time    Employment/ Comments [employer]                   Psychosocial       Row Name 05/31/24 1341       Behavior WDL    Behavior WDL WDL       Emotion Mood WDL    Emotion/Mood/Affect WDL WDL       Speech WDL    Speech WDL WDL       Perceptual State WDL    Perceptual State WDL WDL       Thought Process WDL    Thought Process WDL WDL       Intellectual Performance WDL    Intellectual Performance WDL WDL       Coping/Stress    Major Change/Loss/Stressor birth    Patient Personal Strengths future/goal oriented;motivated;positive attitude;strong support system    Sources of Support other family members;parent(s);significant other;sibling(s)                   Abuse/Neglect       Row Name 05/31/24 1341       Personal Safety    Feels Unsafe at Home or Work/School no    Feels Threatened by Someone no    Does Anyone Try to Keep You From Having Contact with Others or Doing Things Outside Your Home? no    Physical Signs of Abuse Present no                   Legal    No documentation.                  Substance Abuse    No documentation.                   Patient Forms    No documentation.                     SELENA Lyon

## 2024-05-31 NOTE — PROGRESS NOTES
VAGINAL DELIVERY POSTPARTUM DAY 2    5/31/2024  PATIENT: Abdon Andres        MR#:[id number]  LOCATION: [facility]  DATE OF ADMISSION: 5/28/2024  ADMISSION  DIAGNOSIS:   Normal vaginal delivery    Pre-eclampsia in third trimester, without severe features    Insulin controlled gestational diabetes mellitus (GDM) in third trimester     CURRENT DIAGNOSIS: No notes have been filed under this hospital service.  Service: Hospitalist      SUBJECTIVE     Abdon feels well.  Patient describes her lochia as less than menses.  Pain is well controlled.  She does report a rash on her lower back at the site of her anesthesia dressing.   Baby was moved to the NICU overnight for decreased oxygen saturation. They are awaiting heart studies.        OBJECTIVE   Temp: Temp:  [97.4 °F (36.3 °C)-98 °F (36.7 °C)] 97.4 °F (36.3 °C) Temp src: Oral   BP: BP: (108-121)/(59-74) 113/74        Pulse: Heart Rate:  [69-92] 79  RR: Resp:  [16-18] 18    General:  Awake, alert, no acute distress   Cardiac: Regular rate and rhythm    Respiratory: Lungs clear bilaterally, normal respiratory effort    Abdomen: Soft, non-distended, fundus firm, below umbilicus, appropriately tender   Pelvis: deferred   Extremities: Calves NT bilaterally, DTR 2+, no clonus noted, trace edema     Lab Results   Component Value Date    WBC 10.62 05/30/2024    HGB 9.9 (L) 05/30/2024    HCT 31.9 (L) 05/30/2024     05/30/2024    AST 14 05/28/2024    ALT 21 05/28/2024    URICACID 4.8 05/16/2024    CREATININE 0.68 05/28/2024     05/16/2024       ASSESSMENT: Postpartum day 2 after vaginal delivery. Hgb: 9.9.    PLAN: Doing well. Discharge to home potentially if clinical picture of baby allows.     Eliz Quiroz CNM  12:11 EDT  May 31, 2024

## 2024-06-01 ENCOUNTER — LACTATION ENCOUNTER (OUTPATIENT)
Dept: NURSERY | Facility: HOSPITAL | Age: 24
End: 2024-06-01

## 2024-06-01 NOTE — LACTATION NOTE
This note was copied from a baby's chart.  P1 37w6d baby. Mom is breast feeding baby some and baby is being supplemented with formula in NICU. HGP brought to room. Mom is getting ready to shower. She has been pumping with hand pump. Discussed HGP operation, cleaning, sterilizing of pump parts, take all EBM to NICU within one hour of pumping, encouraged pumping every 3hrs and to call for any assistance or questions.

## 2024-06-25 ENCOUNTER — TELEPHONE (OUTPATIENT)
Dept: OBSTETRICS AND GYNECOLOGY | Facility: CLINIC | Age: 24
End: 2024-06-25
Payer: COMMERCIAL

## 2024-06-25 NOTE — TELEPHONE ENCOUNTER
----- Message from Nelson Moeller sent at 6/25/2024 12:02 PM EDT -----  Regarding: FW: Having blood clots  Contact: [phone]        ----- Message -----  From: Hanny Williamson MA  Sent: 6/25/2024  11:46 AM EDT  To: Nelson Moeller MD  Subject: FW: Having blood clots                           Please advise  ----- Message -----  From: Abdon Andres  Sent: 6/25/2024  12:13 AM EDT  To: [facility]  Subject: Having blood clots                               Hey I’m about almost 4 weeks postpartum and i have very light bleeding but im having blood clots all the time with my bleeding, they are light pink and stringy and i notice the clots every time i go to the bathroom or take a shower is this normal?

## 2024-07-12 ENCOUNTER — POSTPARTUM VISIT (OUTPATIENT)
Dept: OBSTETRICS AND GYNECOLOGY | Facility: CLINIC | Age: 24
End: 2024-07-12
Payer: COMMERCIAL

## 2024-07-12 ENCOUNTER — TELEPHONE (OUTPATIENT)
Dept: OBSTETRICS AND GYNECOLOGY | Facility: CLINIC | Age: 24
End: 2024-07-12

## 2024-07-12 VITALS — DIASTOLIC BLOOD PRESSURE: 69 MMHG | BODY MASS INDEX: 27.27 KG/M2 | SYSTOLIC BLOOD PRESSURE: 113 MMHG | WEIGHT: 182 LBS

## 2024-07-12 DIAGNOSIS — Z01.419 PAP TEST, AS PART OF ROUTINE GYNECOLOGICAL EXAMINATION: Primary | ICD-10-CM

## 2024-07-12 DIAGNOSIS — Z00.00 HEALTHCARE MAINTENANCE: ICD-10-CM

## 2024-07-12 DIAGNOSIS — K92.1 BLOODY STOOL: ICD-10-CM

## 2024-07-12 DIAGNOSIS — R87.612 LGSIL ON PAP SMEAR OF CERVIX: ICD-10-CM

## 2024-07-12 RX ORDER — DROSPIRENONE 4 MG/1
1 TABLET, FILM COATED ORAL DAILY
Qty: 28 TABLET | Refills: 11 | Status: SHIPPED | OUTPATIENT
Start: 2024-07-12

## 2024-07-12 NOTE — PROGRESS NOTES
AMBER Andres  is a 23 y.o. female who presents for a 6-week postpartum checkup.  Her baby is doing well.  The baby is breast and bottlefeeding.  The patient is overall doing well.  She does report blood in her stools.  It is not painful, but it is bloody most of the time.  She does not have vaginal bleeding.  Also, the patient had a low-grade GUY on Pap smear during pregnancy.  She declined colposcopy at that time.  Pap 6 weeks postpartum was recommended.    Chief Complaint   Patient presents with    Postpartum Care     Vaginal birth may 29th   Breastfeeding   6 week post op       Past Medical History:   Diagnosis Date    Anxiety     Chlamydia     Gestational diabetes     insulin dependent    History of back surgery 2019    Fell out of window and broke back       Past Surgical History:   Procedure Laterality Date    SPINE SURGERY         Social History     Socioeconomic History    Marital status: Single   Tobacco Use    Smoking status: Never     Passive exposure: Never    Smokeless tobacco: Never   Vaping Use    Vaping status: Never Used   Substance and Sexual Activity    Alcohol use: Never    Drug use: Never    Sexual activity: Yes       The following portions of the patient's history were reviewed and updated as appropriate: allergies, current medications, past family history, past medical history, past social history, past surgical history and problem list.    Review of Systems   Constitutional: Negative.    HENT: Negative.     Respiratory: Negative.     Cardiovascular: Negative.    Gastrointestinal: Negative.    Genitourinary: Negative.    Musculoskeletal: Negative.    Skin: Negative.    Allergic/Immunologic: Negative.    Psychiatric/Behavioral: Negative.               Physical Exam  Vitals and nursing note reviewed.   Constitutional:       Appearance: She is well-developed.   HENT:      Head: Normocephalic and atraumatic.   Cardiovascular:      Rate and Rhythm: Normal rate and regular rhythm.    Pulmonary:      Effort: Pulmonary effort is normal.      Breath sounds: Normal breath sounds. No wheezing or rales.   Chest:      Comments: The breasts are homogeneous.  There are no palpable lumps.  Nipple discharge and axillary adenopathy are absent.  Abdominal:      General: There is no distension.      Palpations: Abdomen is soft.      Tenderness: There is no abdominal tenderness.   Genitourinary:     Labia:         Right: No lesion.         Left: No lesion.       Vagina: Normal. No vaginal discharge.      Cervix: No cervical motion tenderness.      Uterus: Normal. Not enlarged and not tender.       Adnexa:         Right: No mass or tenderness.          Left: No mass or tenderness.        Rectum: Normal. No mass or external hemorrhoid.   Skin:     General: Skin is warm and dry.   Neurological:      Mental Status: She is alert and oriented to person, place, and time.         Assessment    Diagnoses and all orders for this visit:    1. Pap test, as part of routine gynecological examination (Primary)  -     IGP, Rfx Aptima HPV ASCU    2. Bloody stool  -     Ambulatory Referral to Gastroenterology    3. Healthcare maintenance  -     Ambulatory Referral to Family Practice    4. Routine postpartum follow-up    5. LGSIL on Pap smear of cervix    Other orders  -     Drospirenone (Slynd) 4 MG tablet; Take 1 tablet by mouth Daily.  Dispense: 28 tablet; Refill: 11        Plan  Appropriate progress 6 weeks postpartum.  Okay to resume all normal activities of daily living.  Low-grade GUY.  Pap was performed.  If this is negative, I recommend a repeat Pap in 6 months.  If both are negative, the patient may return to yearly screening.  Contraceptive counseling.  The patient would like to use oral contraceptive pills.  She understands that because of breast-feeding, this must be a progesterone only pill.  A prescription for Slynd will be sent.  The patient has been counseled regarding its proper use.  Bloody stools.  There is  not an anal sphincter tear or hemorrhoid on examination.  I also do not palpate a rectal mass.  Because of bloody stools without examination on physical examination, I recommended consultation for further evaluation.  The patient agrees.  Insulin requiring gestational diabetes.  The patient would like to follow-up with her primary care physician for further evaluation to determine if she has become a type II diabetic.  Referral has been sent.    Return in about 6 months (around 1/12/2025).    Social History     Tobacco Use   Smoking Status Never    Passive exposure: Never   Smokeless Tobacco Never

## 2024-07-16 ENCOUNTER — TELEPHONE (OUTPATIENT)
Dept: OBSTETRICS AND GYNECOLOGY | Facility: CLINIC | Age: 24
End: 2024-07-16
Payer: COMMERCIAL

## 2024-07-16 NOTE — TELEPHONE ENCOUNTER
Hello. There are 2 referrals in system. One for gastro and for Family Practice. Patient caaling to check status. Thank You. Pt Ph [phone]

## 2024-07-16 NOTE — TELEPHONE ENCOUNTER
Referrals received on 7/12.  Both set for scheduling.    Pt will receive calls from 2 different scheduling HUB's to schedule appts; one for gastro and one for PCP.

## 2024-07-18 ENCOUNTER — OFFICE VISIT (OUTPATIENT)
Dept: FAMILY MEDICINE CLINIC | Facility: CLINIC | Age: 24
End: 2024-07-18
Payer: COMMERCIAL

## 2024-07-18 VITALS
HEART RATE: 89 BPM | TEMPERATURE: 98 F | BODY MASS INDEX: 26.96 KG/M2 | WEIGHT: 182 LBS | DIASTOLIC BLOOD PRESSURE: 70 MMHG | HEIGHT: 69 IN | SYSTOLIC BLOOD PRESSURE: 110 MMHG | OXYGEN SATURATION: 98 %

## 2024-07-18 DIAGNOSIS — Z00.00 HEALTHCARE MAINTENANCE: ICD-10-CM

## 2024-07-18 DIAGNOSIS — Z76.89 ENCOUNTER TO ESTABLISH CARE: ICD-10-CM

## 2024-07-18 DIAGNOSIS — D50.9 MICROCYTIC ANEMIA: Primary | ICD-10-CM

## 2024-07-18 DIAGNOSIS — Z86.32 HISTORY OF GESTATIONAL DIABETES: ICD-10-CM

## 2024-07-18 PROBLEM — S34.101A: Status: ACTIVE | Noted: 2018-12-10

## 2024-07-18 PROBLEM — N31.9 NEUROGENIC BLADDER: Status: RESOLVED | Noted: 2018-12-10 | Resolved: 2024-07-18

## 2024-07-18 PROBLEM — O24.414 INSULIN CONTROLLED GESTATIONAL DIABETES MELLITUS (GDM) IN THIRD TRIMESTER: Status: RESOLVED | Noted: 2024-05-09 | Resolved: 2024-07-18

## 2024-07-18 PROBLEM — N31.9 NEUROGENIC BLADDER: Status: ACTIVE | Noted: 2018-12-10

## 2024-07-18 PROCEDURE — 99214 OFFICE O/P EST MOD 30 MIN: CPT

## 2024-07-18 NOTE — ASSESSMENT & PLAN NOTE
Increase iron rich foods in the diet.  Discussed trying ferrous gluconate if she continues to be anemic on lab work.

## 2024-07-18 NOTE — ASSESSMENT & PLAN NOTE
Instructed her to continue glucose monitoring once a day at home.  Instructed her to notify office for fasting readings higher than 100.

## 2024-07-19 LAB
ALBUMIN SERPL-MCNC: 4.5 G/DL (ref 4–5)
ALP SERPL-CCNC: 100 IU/L (ref 44–121)
ALT SERPL-CCNC: 30 IU/L (ref 0–32)
AST SERPL-CCNC: 29 IU/L (ref 0–40)
BASOPHILS # BLD AUTO: 0.1 X10E3/UL (ref 0–0.2)
BASOPHILS NFR BLD AUTO: 1 %
BILIRUB SERPL-MCNC: 0.4 MG/DL (ref 0–1.2)
BUN SERPL-MCNC: 12 MG/DL (ref 6–20)
BUN/CREAT SERPL: 17 (ref 9–23)
CALCIUM SERPL-MCNC: 9.8 MG/DL (ref 8.7–10.2)
CHLORIDE SERPL-SCNC: 102 MMOL/L (ref 96–106)
CO2 SERPL-SCNC: 19 MMOL/L (ref 20–29)
CONV .: ABNORMAL
CREAT SERPL-MCNC: 0.69 MG/DL (ref 0.57–1)
CYTOLOGIST CVX/VAG CYTO: ABNORMAL
CYTOLOGY CVX/VAG DOC CYTO: ABNORMAL
CYTOLOGY CVX/VAG DOC THIN PREP: ABNORMAL
DX ICD CODE: ABNORMAL
DX ICD CODE: ABNORMAL
EGFRCR SERPLBLD CKD-EPI 2021: 127 ML/MIN/1.73
EOSINOPHIL # BLD AUTO: 0.2 X10E3/UL (ref 0–0.4)
EOSINOPHIL NFR BLD AUTO: 2 %
ERYTHROCYTE [DISTWIDTH] IN BLOOD BY AUTOMATED COUNT: 22.6 % (ref 11.7–15.4)
FERRITIN SERPL-MCNC: 21 NG/ML (ref 15–150)
GLOBULIN SER CALC-MCNC: 2.9 G/DL (ref 1.5–4.5)
GLUCOSE SERPL-MCNC: 67 MG/DL (ref 70–99)
HBA1C MFR BLD: 5.3 % (ref 4.8–5.6)
HCT VFR BLD AUTO: 39.8 % (ref 34–46.6)
HGB BLD-MCNC: 12.7 G/DL (ref 11.1–15.9)
HPV I/H RISK 4 DNA CVX QL PROBE+SIG AMP: NEGATIVE
IMM GRANULOCYTES # BLD AUTO: 0 X10E3/UL (ref 0–0.1)
IMM GRANULOCYTES NFR BLD AUTO: 0 %
IRON SATN MFR SERPL: 15 % (ref 15–55)
IRON SERPL-MCNC: 65 UG/DL (ref 27–159)
LYMPHOCYTES # BLD AUTO: 1.4 X10E3/UL (ref 0.7–3.1)
LYMPHOCYTES NFR BLD AUTO: 19 %
Lab: ABNORMAL
MCH RBC QN AUTO: 24.1 PG (ref 26.6–33)
MCHC RBC AUTO-ENTMCNC: 31.9 G/DL (ref 31.5–35.7)
MCV RBC AUTO: 76 FL (ref 79–97)
MONOCYTES # BLD AUTO: 0.6 X10E3/UL (ref 0.1–0.9)
MONOCYTES NFR BLD AUTO: 7 %
MORPHOLOGY BLD-IMP: ABNORMAL
NEUTROPHILS # BLD AUTO: 5.5 X10E3/UL (ref 1.4–7)
NEUTROPHILS NFR BLD AUTO: 71 %
OTHER STN SPEC: ABNORMAL
PATHOLOGIST CVX/VAG CYTO: ABNORMAL
PLATELET # BLD AUTO: 312 X10E3/UL (ref 150–450)
POTASSIUM SERPL-SCNC: 4 MMOL/L (ref 3.5–5.2)
PROT SERPL-MCNC: 7.4 G/DL (ref 6–8.5)
RBC # BLD AUTO: 5.26 X10E6/UL (ref 3.77–5.28)
RECOM F/U CVX/VAG CYTO: ABNORMAL
RETICS/RBC NFR AUTO: 1.1 % (ref 0.6–2.6)
SODIUM SERPL-SCNC: 142 MMOL/L (ref 134–144)
STAT OF ADQ CVX/VAG CYTO-IMP: ABNORMAL
TIBC SERPL-MCNC: 421 UG/DL (ref 250–450)
UIBC SERPL-MCNC: 356 UG/DL (ref 131–425)
WBC # BLD AUTO: 7.7 X10E3/UL (ref 3.4–10.8)

## 2024-07-25 ENCOUNTER — TELEPHONE (OUTPATIENT)
Dept: OBSTETRICS AND GYNECOLOGY | Facility: CLINIC | Age: 24
End: 2024-07-25
Payer: COMMERCIAL

## 2024-07-25 NOTE — TELEPHONE ENCOUNTER
----- Message from Nelson Moeller sent at 7/24/2024 10:09 AM EDT -----  Please contact the patient and let her know that her Pap showed atypical cells of undetermined significance.  While this is usually just inflammation, there is a risk that it could represent precancerous change.  Because her previous Pap was abnormal as well, I recommend a colposcopy.  Please help her to schedule this.  Thank you.

## 2024-08-02 ENCOUNTER — OFFICE VISIT (OUTPATIENT)
Dept: OBSTETRICS AND GYNECOLOGY | Facility: CLINIC | Age: 24
End: 2024-08-02
Payer: COMMERCIAL

## 2024-08-02 VITALS — BODY MASS INDEX: 27.03 KG/M2 | WEIGHT: 180.4 LBS | DIASTOLIC BLOOD PRESSURE: 78 MMHG | SYSTOLIC BLOOD PRESSURE: 122 MMHG

## 2024-08-02 DIAGNOSIS — R87.619 ASCUS (ATYPICAL SQUAMOUS CELLS OF UNDETERMINED SIGNIFICANCE) ON GYNECOLOGIC PAPANICOLAOU SMEAR COMPLICATING PREGNANCY, ANTEPARTUM: Primary | ICD-10-CM

## 2024-08-02 DIAGNOSIS — O28.2 ASCUS (ATYPICAL SQUAMOUS CELLS OF UNDETERMINED SIGNIFICANCE) ON GYNECOLOGIC PAPANICOLAOU SMEAR COMPLICATING PREGNANCY, ANTEPARTUM: Primary | ICD-10-CM

## 2024-08-02 NOTE — PROGRESS NOTES
AMBER   Abdon Andres  is a 23 y.o. female who presents for colposcopy due to recurrent ASCUS on Pap.  I counseled the patient regarding the procedure and answered her questions.  She would like to proceed.    Chief Complaint   Patient presents with    Colposcopy     colpo       Past Medical History:   Diagnosis Date    Anxiety     Chlamydia     Eczema     Gestational diabetes     insulin dependent    History of back surgery 2019    Fell out of window and broke back    Insulin controlled gestational diabetes mellitus (GDM) in third trimester 05/09/2024    Neurogenic bladder 12/10/2018       Past Surgical History:   Procedure Laterality Date    SPINE SURGERY         Social History     Socioeconomic History    Marital status: Single   Tobacco Use    Smoking status: Never     Passive exposure: Never    Smokeless tobacco: Never   Vaping Use    Vaping status: Never Used   Substance and Sexual Activity    Alcohol use: Yes     Comment: occasional    Drug use: Never    Sexual activity: Yes       The following portions of the patient's history were reviewed and updated as appropriate: allergies, current medications, past family history, past medical history, past social history, past surgical history and problem list.    Review of Systems          Physical Exam  Vitals and nursing note reviewed.   Constitutional:       Appearance: Normal appearance.   Genitourinary:           Comments: Colposcopy was performed with good visualization of the transformation zone.  There was mild acetowhite epithelium at the 7 o'clock position.  Biopsy performed.  This was treated with Monsel solution to hemostasis.  All instruments were removed.  The patient tolerated the procedure well.  Neurological:      Mental Status: She is alert and oriented to person, place, and time.         Assessment    Diagnoses and all orders for this visit:    1. ASCUS (atypical squamous cells of undetermined significance) on gynecologic Papanicolaou smear  complicating pregnancy, antepartum (Primary)        Plan  Colposcopy with biopsy was performed as described above.  The patient tolerated the procedure well.    No follow-ups on file.    Social History     Tobacco Use   Smoking Status Never    Passive exposure: Never   Smokeless Tobacco Never

## 2024-08-08 LAB
DX ICD CODE: NORMAL
PATH REPORT.FINAL DX SPEC: NORMAL
PATH REPORT.GROSS SPEC: NORMAL
PATH REPORT.SITE OF ORIGIN SPEC: NORMAL
PATHOLOGIST NAME: NORMAL
PAYMENT PROCEDURE: NORMAL

## 2024-08-13 ENCOUNTER — TELEPHONE (OUTPATIENT)
Dept: OBSTETRICS AND GYNECOLOGY | Facility: CLINIC | Age: 24
End: 2024-08-13
Payer: COMMERCIAL

## 2024-08-13 NOTE — TELEPHONE ENCOUNTER
----- Message from Nelson Moeller sent at 8/9/2024 11:36 AM EDT -----  Please contact the patient and let her know that I have reviewed her pathology.  It shows a very mild abnormality which is likely to resolve without treatment.  I recommend a repeat Pap in 6 months.  If the patient has further questions or concerns, please let me know.  Otherwise, please help her to schedule a repeat Pap in 6 months.  Thank you.

## 2024-08-14 ENCOUNTER — HOSPITAL ENCOUNTER (EMERGENCY)
Facility: HOSPITAL | Age: 24
Discharge: HOME | End: 2024-08-14
Attending: EMERGENCY MEDICINE
Payer: COMMERCIAL

## 2024-08-14 VITALS
OXYGEN SATURATION: 99 % | SYSTOLIC BLOOD PRESSURE: 124 MMHG | DIASTOLIC BLOOD PRESSURE: 61 MMHG | WEIGHT: 184 LBS | RESPIRATION RATE: 18 BRPM | BODY MASS INDEX: 27.89 KG/M2 | HEART RATE: 68 BPM | HEIGHT: 68 IN | TEMPERATURE: 98.1 F

## 2024-08-14 DIAGNOSIS — R30.0 DYSURIA: Primary | ICD-10-CM

## 2024-08-14 LAB
ANION GAP SERPL CALCULATED.3IONS-SCNC: 12.9 MMOL/L (ref 5–15)
BACTERIA UR QL AUTO: ABNORMAL /HPF
BASOPHILS # BLD AUTO: 0.04 10*3/MM3 (ref 0–0.2)
BASOPHILS NFR BLD AUTO: 0.5 % (ref 0–1.5)
BILIRUB UR QL STRIP: NEGATIVE
BUN SERPL-MCNC: 12 MG/DL (ref 6–20)
BUN/CREAT SERPL: 16.9 (ref 7–25)
CALCIUM SPEC-SCNC: 9.4 MG/DL (ref 8.6–10.5)
CHLORIDE SERPL-SCNC: 107 MMOL/L (ref 98–107)
CLARITY UR: CLEAR
CO2 SERPL-SCNC: 21.1 MMOL/L (ref 22–29)
COLOR UR: YELLOW
CREAT SERPL-MCNC: 0.71 MG/DL (ref 0.57–1)
DEPRECATED RDW RBC AUTO: 47.5 FL (ref 37–54)
EGFRCR SERPLBLD CKD-EPI 2021: 124.2 ML/MIN/1.73
EOSINOPHIL # BLD AUTO: 0.24 10*3/MM3 (ref 0–0.4)
EOSINOPHIL NFR BLD AUTO: 3 % (ref 0.3–6.2)
ERYTHROCYTE [DISTWIDTH] IN BLOOD BY AUTOMATED COUNT: 17.4 % (ref 12.3–15.4)
GLUCOSE SERPL-MCNC: 93 MG/DL (ref 65–99)
GLUCOSE UR STRIP-MCNC: NEGATIVE MG/DL
HCT VFR BLD AUTO: 38.2 % (ref 34–46.6)
HGB BLD-MCNC: 12.5 G/DL (ref 12–15.9)
HGB UR QL STRIP.AUTO: ABNORMAL
HYALINE CASTS UR QL AUTO: ABNORMAL /LPF
IMM GRANULOCYTES # BLD AUTO: 0.04 10*3/MM3 (ref 0–0.05)
IMM GRANULOCYTES NFR BLD AUTO: 0.5 % (ref 0–0.5)
KETONES UR QL STRIP: NEGATIVE
LEUKOCYTE ESTERASE UR QL STRIP.AUTO: NEGATIVE
LYMPHOCYTES # BLD AUTO: 1.67 10*3/MM3 (ref 0.7–3.1)
LYMPHOCYTES NFR BLD AUTO: 21 % (ref 19.6–45.3)
MCH RBC QN AUTO: 25.4 PG (ref 26.6–33)
MCHC RBC AUTO-ENTMCNC: 32.7 G/DL (ref 31.5–35.7)
MCV RBC AUTO: 77.5 FL (ref 79–97)
MONOCYTES # BLD AUTO: 0.6 10*3/MM3 (ref 0.1–0.9)
MONOCYTES NFR BLD AUTO: 7.6 % (ref 5–12)
NEUTROPHILS NFR BLD AUTO: 5.35 10*3/MM3 (ref 1.7–7)
NEUTROPHILS NFR BLD AUTO: 67.4 % (ref 42.7–76)
NITRITE UR QL STRIP: NEGATIVE
NRBC BLD AUTO-RTO: 0 /100 WBC (ref 0–0.2)
PH UR STRIP.AUTO: 5.5 [PH] (ref 5–8)
PLATELET # BLD AUTO: 292 10*3/MM3 (ref 140–450)
PMV BLD AUTO: 9.7 FL (ref 6–12)
POTASSIUM SERPL-SCNC: 3.8 MMOL/L (ref 3.5–5.2)
PROT UR QL STRIP: ABNORMAL
RBC # BLD AUTO: 4.93 10*6/MM3 (ref 3.77–5.28)
RBC # UR STRIP: ABNORMAL /HPF
REF LAB TEST METHOD: ABNORMAL
SODIUM SERPL-SCNC: 141 MMOL/L (ref 136–145)
SP GR UR STRIP: >1.03 (ref 1–1.03)
SQUAMOUS #/AREA URNS HPF: ABNORMAL /HPF
UROBILINOGEN UR QL STRIP: ABNORMAL
WBC # UR STRIP: ABNORMAL /HPF
WBC NRBC COR # BLD AUTO: 7.94 10*3/MM3 (ref 3.4–10.8)

## 2024-08-14 PROCEDURE — 80048 BASIC METABOLIC PNL TOTAL CA: CPT | Performed by: EMERGENCY MEDICINE

## 2024-08-14 PROCEDURE — 36415 COLL VENOUS BLD VENIPUNCTURE: CPT

## 2024-08-14 PROCEDURE — 99283 EMERGENCY DEPT VISIT LOW MDM: CPT

## 2024-08-14 PROCEDURE — 85025 COMPLETE CBC W/AUTO DIFF WBC: CPT | Performed by: EMERGENCY MEDICINE

## 2024-08-14 PROCEDURE — 81001 URINALYSIS AUTO W/SCOPE: CPT | Performed by: EMERGENCY MEDICINE

## 2024-08-14 NOTE — ED TRIAGE NOTES
Pt to ED from home via pv. Pt reports hx frequent utis. Pt reports abd pain, increased frequency, urgency and dysuria. Pt also states she noticed blood after wiping.

## 2024-08-15 NOTE — ED PROVIDER NOTES
EMERGENCY DEPARTMENT ENCOUNTER    Room Number:  10/10  PCP: Aria Higgins APRN  Historian: Patient      HPI:  Chief Complaint: Dysuria  A complete HPI/ROS/PMH/PSH/SH/FH are unobtainable due to: None    Context: Abdon Andres is a 23 y.o. female who presents to the ED via private vehicle c/o acute dysuria for the last 2 to 3 days.  Has had some increased urgency and frequency.  Denies any abdominal pain, no nausea or vomiting, no fevers or chills.  She is 3 months postpartum.  Feels similar to prior UTIs. Patient is breastfeeding.       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    Microcytic anemia 07/18/2024    History of gestational diabetes 07/18/2024     Resolved Ambulatory Problems     Diagnosis Date Noted    Insulin controlled gestational diabetes mellitus (GDM) in third trimester 05/09/2024    Neurogenic bladder 12/10/2018     Past Medical History:   Diagnosis Date    Anxiety     Chlamydia     Eczema     Gestational diabetes     History of back surgery 2019         PAST SURGICAL HISTORY  Past Surgical History:   Procedure Laterality Date    SPINE SURGERY           FAMILY HISTORY  Family History   Adopted: Yes   Problem Relation Age of Onset    Cancer Maternal Grandfather          SOCIAL HISTORY  Social History     Socioeconomic History    Marital status: Single   Tobacco Use    Smoking status: Never     Passive exposure: Never    Smokeless tobacco: Never   Vaping Use    Vaping status: Never Used   Substance and Sexual Activity    Alcohol use: Yes     Comment: occasional    Drug use: Never    Sexual activity: Yes         ALLERGIES  Other        REVIEW OF SYSTEMS  Review of Systems     All  systems reviewed and negative except for those discussed in HPI.       PHYSICAL EXAM    I have reviewed the triage vital signs and nursing notes.    ED Triage Vitals   Temp Heart Rate Resp BP SpO2   08/14/24 1915 08/14/24 1915 08/14/24 1915 08/14/24 1920 08/14/24 1915   98.1 °F (36.7 °C) 98 18 133/56 99 %      Temp src Heart Rate Source Patient Position BP Location FiO2 (%)   -- -- -- -- --              Physical Exam  General: No acute distress, nontoxic  HEENT: Mucous membranes moist, atraumatic, EOMI  Neck: Full ROM  Pulm: Symmetric chest rise, nonlabored, lungs CTAB  Cardiovascular: Regular rate and rhythm, intact distal pulses  GI: Soft, nontender, nondistended, no rebound, no guarding, bowel sounds present  MSK: Full ROM, no deformity  Skin: Warm, dry  Neuro: Awake, alert, oriented x 4, GCS 15, moving all extremities, no focal deficits  Psych: Calm, cooperative        LAB RESULTS  Recent Results (from the past 24 hour(s))   Basic Metabolic Panel    Collection Time: 08/14/24  8:04 PM    Specimen: Blood   Result Value Ref Range    Glucose 93 65 - 99 mg/dL    BUN 12 6 - 20 mg/dL    Creatinine 0.71 0.57 - 1.00 mg/dL    Sodium 141 136 - 145 mmol/L    Potassium 3.8 3.5 - 5.2 mmol/L    Chloride 107 98 - 107 mmol/L    CO2 21.1 (L) 22.0 - 29.0 mmol/L    Calcium 9.4 8.6 - 10.5 mg/dL    BUN/Creatinine Ratio 16.9 7.0 - 25.0    Anion Gap 12.9 5.0 - 15.0 mmol/L    eGFR 124.2 >60.0 mL/min/1.73   Urinalysis With Microscopic If Indicated (No Culture) - Urine, Clean Catch    Collection Time: 08/14/24  8:04 PM    Specimen: Urine, Clean Catch   Result Value Ref Range    Color, UA Yellow Yellow, Straw    Appearance, UA Clear Clear    pH, UA 5.5 5.0 - 8.0    Specific Gravity, UA >1.030 (H) 1.005 - 1.030    Glucose, UA Negative Negative    Ketones, UA Negative Negative    Bilirubin, UA Negative Negative    Blood, UA Moderate (2+) (A) Negative    Protein, UA Trace (A) Negative    Leuk Esterase, UA Negative Negative    Nitrite, UA  Negative Negative    Urobilinogen, UA 1.0 E.U./dL 0.2 - 1.0 E.U./dL   CBC Auto Differential    Collection Time: 08/14/24  8:04 PM    Specimen: Blood   Result Value Ref Range    WBC 7.94 3.40 - 10.80 10*3/mm3    RBC 4.93 3.77 - 5.28 10*6/mm3    Hemoglobin 12.5 12.0 - 15.9 g/dL    Hematocrit 38.2 34.0 - 46.6 %    MCV 77.5 (L) 79.0 - 97.0 fL    MCH 25.4 (L) 26.6 - 33.0 pg    MCHC 32.7 31.5 - 35.7 g/dL    RDW 17.4 (H) 12.3 - 15.4 %    RDW-SD 47.5 37.0 - 54.0 fl    MPV 9.7 6.0 - 12.0 fL    Platelets 292 140 - 450 10*3/mm3    Neutrophil % 67.4 42.7 - 76.0 %    Lymphocyte % 21.0 19.6 - 45.3 %    Monocyte % 7.6 5.0 - 12.0 %    Eosinophil % 3.0 0.3 - 6.2 %    Basophil % 0.5 0.0 - 1.5 %    Immature Grans % 0.5 0.0 - 0.5 %    Neutrophils, Absolute 5.35 1.70 - 7.00 10*3/mm3    Lymphocytes, Absolute 1.67 0.70 - 3.10 10*3/mm3    Monocytes, Absolute 0.60 0.10 - 0.90 10*3/mm3    Eosinophils, Absolute 0.24 0.00 - 0.40 10*3/mm3    Basophils, Absolute 0.04 0.00 - 0.20 10*3/mm3    Immature Grans, Absolute 0.04 0.00 - 0.05 10*3/mm3    nRBC 0.0 0.0 - 0.2 /100 WBC   Urinalysis, Microscopic Only - Urine, Clean Catch    Collection Time: 08/14/24  8:04 PM    Specimen: Urine, Clean Catch   Result Value Ref Range    RBC, UA 0-2 None Seen, 0-2 /HPF    WBC, UA 3-5 (A) None Seen, 0-2 /HPF    Bacteria, UA None Seen None Seen /HPF    Squamous Epithelial Cells, UA 7-12 (A) None Seen, 0-2 /HPF    Hyaline Casts, UA 0-2 None Seen /LPF    Methodology Automated Microscopy        Ordered the above labs and independently interpreted results. My findings will be discussed in the medical decision making section below        RADIOLOGY  No Radiology Exams Resulted Within Past 24 Hours    Ordered the above noted radiological studies.  Independently interpreted by me and my independent review of findings can be found in the ED Course.  See dictation for official radiology interpretation.      PROCEDURES    Procedures        MEDICATIONS GIVEN IN ER    Medications  - No data to display      PROGRESS, DATA ANALYSIS, CONSULTS, AND MEDICAL DECISION MAKING    Please note that this section constitutes my independent interpretation of clinical data including lab results, radiology, EKG's.  This constitutes my independent professional opinion regarding differential diagnosis and management of this patient.  It may include any factors such as history from outside sources, review of external records, social determinants of health, management of medications, response to those treatments, and discussions with other providers.    Differential Diagnosis and Plan: Initial concern for UTI, pyelonephritis, urolithiasis with renal colic, among others.  Plan for labs, and reevaluation with results.    Additional sources:  - Discussed/ obtained information from independent historians:       - (Social Determinants of Health): None     - Shared decision making:  Patient fully updated on and in agreement with the course and plan moving forward    ED Course as of 08/14/24 2110   Wed Aug 14, 2024   2039 WBC: 7.94 [DC]   2039 Hemoglobin: 12.5 [DC]   2039 Platelets: 292 [DC]   2039 Nitrite, UA: Negative [DC]   2039 Leukocytes, UA: Negative [DC]   2039 Blood, UA(!): Moderate (2+) [DC]   2039 Bacteria, UA: None Seen [DC]   2039 WBC, UA(!): 3-5 [DC]   2039 RBC, UA: 0-2 [DC]   2050 Glucose: 93 [DC]   2050 BUN: 12 [DC]   2050 Creatinine: 0.71 [DC]   2050 Sodium: 141 [DC]   2050 Potassium: 3.8 [DC]   2057 Patient updated on the reassuring findings today, no signs of infection.  Discussed the possibility of possibly urolithiasis with renal colic but I do not believe a CT scan is necessarily needed at this time.  I would say her presentation is not clearly indicative of that.  Recommended Tylenol, good hydration, cranberry juice, and outpatient follow-up as needed.  ED return for worsening symptoms as needed. [DC]      ED Course User Index  [DC] Dariel Chase MD       Hospitalization Considered?:     Orders  Placed During This Visit:  Orders Placed This Encounter   Procedures    Basic Metabolic Panel    Urinalysis With Microscopic If Indicated (No Culture) - Urine, Clean Catch    CBC Auto Differential    Urinalysis, Microscopic Only - Urine, Clean Catch    CBC & Differential       Additional orders considered but not placed:      Independent interpretation of labs, radiology studies, and discussions with consultants: See ED Course        AS OF 21:10 EDT VITALS:    BP - 124/61  HR - 68  TEMP - 98.1 °F (36.7 °C)  02 SATS - 99%          DIAGNOSIS  Final diagnoses:   Dysuria         DISPOSITION  ED Disposition       ED Disposition   Discharge    Condition   Stable    Comment   --                Please note that portions of this document were completed with a voice recognition program.    Note Disclaimer: At [facility], we believe that sharing information builds trust and better relationships. You are receiving this note because you recently visited [facility]. It is possible you will see health information before a provider has talked with you about it. This kind of information can be easy to misunderstand. To help you fully understand what it means for your health, we urge you to discuss this note with your provider.                       Dariel Chase MD  08/14/24 1915

## 2024-08-15 NOTE — DISCHARGE INSTRUCTIONS
Tylenol, plenty water, can reduce, and outpatient follow-up as needed, ED return for worsening symptoms as needed.

## 2024-08-16 ENCOUNTER — HOSPITAL ENCOUNTER (EMERGENCY)
Facility: HOSPITAL | Age: 24
Discharge: HOME | End: 2024-08-16
Attending: EMERGENCY MEDICINE
Payer: COMMERCIAL

## 2024-08-16 ENCOUNTER — APPOINTMENT (OUTPATIENT)
Dept: CT IMAGING | Facility: HOSPITAL | Age: 24
End: 2024-08-16
Payer: COMMERCIAL

## 2024-08-16 VITALS
OXYGEN SATURATION: 99 % | WEIGHT: 184 LBS | DIASTOLIC BLOOD PRESSURE: 84 MMHG | BODY MASS INDEX: 27.89 KG/M2 | TEMPERATURE: 97.8 F | HEIGHT: 68 IN | RESPIRATION RATE: 16 BRPM | HEART RATE: 59 BPM | SYSTOLIC BLOOD PRESSURE: 129 MMHG

## 2024-08-16 DIAGNOSIS — K59.00 CONSTIPATION, UNSPECIFIED CONSTIPATION TYPE: Primary | ICD-10-CM

## 2024-08-16 DIAGNOSIS — R10.31 RIGHT LOWER QUADRANT ABDOMINAL PAIN: ICD-10-CM

## 2024-08-16 LAB
ALBUMIN SERPL-MCNC: 4.5 G/DL (ref 3.5–5.2)
ALBUMIN/GLOB SERPL: 1.4 G/DL
ALP SERPL-CCNC: 111 U/L (ref 39–117)
ALT SERPL W P-5'-P-CCNC: 33 U/L (ref 1–33)
ANION GAP SERPL CALCULATED.3IONS-SCNC: 12 MMOL/L (ref 5–15)
AST SERPL-CCNC: 30 U/L (ref 1–32)
BACTERIA UR QL AUTO: ABNORMAL /HPF
BASOPHILS # BLD AUTO: 0.04 10*3/MM3 (ref 0–0.2)
BASOPHILS NFR BLD AUTO: 0.5 % (ref 0–1.5)
BILIRUB SERPL-MCNC: <0.2 MG/DL (ref 0–1.2)
BILIRUB UR QL STRIP: NEGATIVE
BUN SERPL-MCNC: 12 MG/DL (ref 6–20)
BUN/CREAT SERPL: 15 (ref 7–25)
CALCIUM SPEC-SCNC: 9.9 MG/DL (ref 8.6–10.5)
CHLORIDE SERPL-SCNC: 110 MMOL/L (ref 98–107)
CLARITY UR: CLEAR
CO2 SERPL-SCNC: 23 MMOL/L (ref 22–29)
COLOR UR: YELLOW
CREAT SERPL-MCNC: 0.8 MG/DL (ref 0.57–1)
DEPRECATED RDW RBC AUTO: 47.8 FL (ref 37–54)
EGFRCR SERPLBLD CKD-EPI 2021: 107.7 ML/MIN/1.73
EOSINOPHIL # BLD AUTO: 0.22 10*3/MM3 (ref 0–0.4)
EOSINOPHIL NFR BLD AUTO: 2.8 % (ref 0.3–6.2)
ERYTHROCYTE [DISTWIDTH] IN BLOOD BY AUTOMATED COUNT: 17.3 % (ref 12.3–15.4)
GLOBULIN UR ELPH-MCNC: 3.2 GM/DL
GLUCOSE SERPL-MCNC: 113 MG/DL (ref 65–99)
GLUCOSE UR STRIP-MCNC: NEGATIVE MG/DL
HCG SERPL QL: NEGATIVE
HCT VFR BLD AUTO: 38.1 % (ref 34–46.6)
HGB BLD-MCNC: 12.4 G/DL (ref 12–15.9)
HGB UR QL STRIP.AUTO: ABNORMAL
HYALINE CASTS UR QL AUTO: ABNORMAL /LPF
IMM GRANULOCYTES # BLD AUTO: 0.02 10*3/MM3 (ref 0–0.05)
IMM GRANULOCYTES NFR BLD AUTO: 0.3 % (ref 0–0.5)
KETONES UR QL STRIP: NEGATIVE
LEUKOCYTE ESTERASE UR QL STRIP.AUTO: NEGATIVE
LIPASE SERPL-CCNC: 24 U/L (ref 13–60)
LYMPHOCYTES # BLD AUTO: 1.69 10*3/MM3 (ref 0.7–3.1)
LYMPHOCYTES NFR BLD AUTO: 21.9 % (ref 19.6–45.3)
MCH RBC QN AUTO: 25.3 PG (ref 26.6–33)
MCHC RBC AUTO-ENTMCNC: 32.5 G/DL (ref 31.5–35.7)
MCV RBC AUTO: 77.8 FL (ref 79–97)
MONOCYTES # BLD AUTO: 0.54 10*3/MM3 (ref 0.1–0.9)
MONOCYTES NFR BLD AUTO: 7 % (ref 5–12)
NEUTROPHILS NFR BLD AUTO: 5.22 10*3/MM3 (ref 1.7–7)
NEUTROPHILS NFR BLD AUTO: 67.5 % (ref 42.7–76)
NITRITE UR QL STRIP: NEGATIVE
NRBC BLD AUTO-RTO: 0 /100 WBC (ref 0–0.2)
PH UR STRIP.AUTO: 6.5 [PH] (ref 5–8)
PLATELET # BLD AUTO: 303 10*3/MM3 (ref 140–450)
PMV BLD AUTO: 10 FL (ref 6–12)
POTASSIUM SERPL-SCNC: 3.7 MMOL/L (ref 3.5–5.2)
PROT SERPL-MCNC: 7.7 G/DL (ref 6–8.5)
PROT UR QL STRIP: NEGATIVE
RBC # BLD AUTO: 4.9 10*6/MM3 (ref 3.77–5.28)
RBC # UR STRIP: ABNORMAL /HPF
REF LAB TEST METHOD: ABNORMAL
SODIUM SERPL-SCNC: 145 MMOL/L (ref 136–145)
SP GR UR STRIP: >1.03 (ref 1–1.03)
SQUAMOUS #/AREA URNS HPF: ABNORMAL /HPF
UROBILINOGEN UR QL STRIP: ABNORMAL
WBC # UR STRIP: ABNORMAL /HPF
WBC NRBC COR # BLD AUTO: 7.73 10*3/MM3 (ref 3.4–10.8)

## 2024-08-16 PROCEDURE — 25810000003 SODIUM CHLORIDE 0.9 % SOLUTION: Performed by: NURSE PRACTITIONER

## 2024-08-16 PROCEDURE — 25010000002 ONDANSETRON PER 1 MG: Performed by: NURSE PRACTITIONER

## 2024-08-16 PROCEDURE — 84703 CHORIONIC GONADOTROPIN ASSAY: CPT | Performed by: NURSE PRACTITIONER

## 2024-08-16 PROCEDURE — 83690 ASSAY OF LIPASE: CPT | Performed by: NURSE PRACTITIONER

## 2024-08-16 PROCEDURE — 99285 EMERGENCY DEPT VISIT HI MDM: CPT

## 2024-08-16 PROCEDURE — 25010000002 KETOROLAC TROMETHAMINE PER 15 MG: Performed by: NURSE PRACTITIONER

## 2024-08-16 PROCEDURE — 25510000001 IOPAMIDOL 61 % SOLUTION: Performed by: EMERGENCY MEDICINE

## 2024-08-16 PROCEDURE — 96361 HYDRATE IV INFUSION ADD-ON: CPT

## 2024-08-16 PROCEDURE — 96375 TX/PRO/DX INJ NEW DRUG ADDON: CPT

## 2024-08-16 PROCEDURE — 80053 COMPREHEN METABOLIC PANEL: CPT | Performed by: NURSE PRACTITIONER

## 2024-08-16 PROCEDURE — 74177 CT ABD & PELVIS W/CONTRAST: CPT

## 2024-08-16 PROCEDURE — 96374 THER/PROPH/DIAG INJ IV PUSH: CPT

## 2024-08-16 PROCEDURE — 81001 URINALYSIS AUTO W/SCOPE: CPT | Performed by: NURSE PRACTITIONER

## 2024-08-16 PROCEDURE — 85025 COMPLETE CBC W/AUTO DIFF WBC: CPT | Performed by: NURSE PRACTITIONER

## 2024-08-16 RX ORDER — SODIUM CHLORIDE 0.9 % (FLUSH) 0.9 %
10 SYRINGE (ML) INJECTION AS NEEDED
Status: DISCONTINUED | OUTPATIENT
Start: 2024-08-16 | End: 2024-08-17 | Stop reason: HOSPADM

## 2024-08-16 RX ORDER — ONDANSETRON 2 MG/ML
4 INJECTION INTRAMUSCULAR; INTRAVENOUS ONCE
Status: COMPLETED | OUTPATIENT
Start: 2024-08-16 | End: 2024-08-16

## 2024-08-16 RX ORDER — POLYETHYLENE GLYCOL 3350 17 G/17G
17 POWDER, FOR SOLUTION ORAL DAILY PRN
Qty: 10 EACH | Refills: 0 | Status: SHIPPED | OUTPATIENT
Start: 2024-08-16

## 2024-08-16 RX ORDER — KETOROLAC TROMETHAMINE 15 MG/ML
15 INJECTION, SOLUTION INTRAMUSCULAR; INTRAVENOUS ONCE
Status: COMPLETED | OUTPATIENT
Start: 2024-08-16 | End: 2024-08-16

## 2024-08-16 RX ADMIN — SODIUM CHLORIDE 1000 ML: 9 INJECTION, SOLUTION INTRAVENOUS at 20:16

## 2024-08-16 RX ADMIN — KETOROLAC TROMETHAMINE 15 MG: 15 INJECTION, SOLUTION INTRAMUSCULAR; INTRAVENOUS at 21:04

## 2024-08-16 RX ADMIN — ONDANSETRON 4 MG: 2 INJECTION, SOLUTION INTRAMUSCULAR; INTRAVENOUS at 21:17

## 2024-08-16 RX ADMIN — IOPAMIDOL 85 ML: 612 INJECTION, SOLUTION INTRAVENOUS at 21:11

## 2024-08-17 NOTE — ED PROVIDER NOTES
SHARED VISIT: This visit was performed by BOTH a physician and an APC. The substantive portion of the medical decision making was performed by this attesting physician who made or approved the management plan and takes responsibility for patient management. All studies in the APC note (if performed) were independently interpreted by me.      The JAZIEL and I have discussed this patient's history, physical exam, and treatment plan.  I have reviewed the documentation and personally had a face to face interaction with the patient. I affirm the documentation and agree with the treatment and plan.  The attached note describes my personal findings.       I provided a substantive portion of the care of the patient.  I personally performed the physical exam in its entirety, and below are my findings.        History  23-year-old female presents with constipation and lower abdominal pain ongoing over several days.  Has a history of recurrent constipation and is taking stool softeners.    Physical Exam  Vital Signs reviewed  GENERAL: Alert well-appearing female no obvious distress  HENT: nares patent  EYES: no scleral icterus  CV: regular rhythm, regular rate  RESPIRATORY: normal effort  ABDOMEN: soft, mild lower abdominal tenderness  MUSCULOSKELETAL: no deformity  NEURO: Strength sensation and coordination are grossly intact.  Speech and mentation are unremarkable  SKIN: warm, dry      Assessment and Data Review    ED Course as of 08/19/24 1618   Fri Aug 16, 2024   2029 I discussed the case with Dr. Longoria and he agrees to evaluate the patient at the bedside.    [AR]   2056 WBC: 7.73 [AR]   2057 HCG Qualitative: Negative [AR]   2228 CT scan independently interpreted by me shows no obvious perforation or obstruction. [DB]   2240 The patient was reexamined.  They have had symptomatic improvement during their ED stay.  I discussed today's findings with the patient, explaining the pertinent positives and negatives from today's  visit, and the plan of care.  Discussed plan for discharge as there is no emergent indication for admission.  Discussed limitation of the ED work-up and that this is to rule out life-threatening emergencies but that they could require further testing as determined by their primary care and or any referred specialist patient is agreeable and understands need for follow-up and repeat exam/testing.  Patient is aware that discharge does not mean there is nothing wrong, indicates no emergency is present, and that they must continue their care with their primary care physician and/or any referred specialist.  They were given appropriate follow-up with their primary care physician and/or specialist.  I had an extensive discussion on the expected clinical course and return precautions.  Patient understands to return to the emergency department for continuation, worsening, or new symptoms.  I answered any of the patient's questions. Patient was discharged home in a stable condition.     [AR]      ED Course User Index  [AR] Pamela Morales APRN  [DB] Miguel Ángel Longoria MD       I did discuss treatment and evaluation of this patient with SINTIA dEouard.    I did independently interpret and evaluate ED testing which is notable for the following:    CT imaging showing no obvious perforation or obstruction, possible constipation  Labs notable for normal white blood count and hemoglobin to go against infection or anemia  Chemistries benign without significant hepatic or renal failure.    Would suggest treating constipation with as needed MiraLAX.  Can follow-up outpatient GI clinic.  See no indication for admission or further workup at this point given benign exam and current testing.     Miguel Ángel Longoria MD  08/19/24 5030

## 2024-08-17 NOTE — ED PROVIDER NOTES
EMERGENCY DEPARTMENT ENCOUNTER  Room Number:  35/35  PCP: Aria Higgins APRN  Independent Historians: Patient      HPI:  Chief Complaint: had concerns including Abdominal Pain.     A complete HPI/ROS/PMH/PSH/SH/FH are unobtainable due to: None    Chronic or social conditions impacting patient care (Social Determinants of Health): None      Context: Abdon Andres is a 23 y.o. female with a medical history of gestational diabetes, preeclampsia in third trimester, who presents to the emergency department with complaints of right lower quadrant abdominal pain.  Symptoms started over the last 2 days and have been constant.  Pain is described as sharp ache.  She is unaware of any alleviating or aggravating factors.  Patient reports she was evaluated in the emergency department 2 days ago for dysuria, urgency, and frequency.  She states she was advised to return to the emergency department if her symptoms increased or she develop pain.  Patient denies fever, chills, headache, lightheadedness, dizziness, numbness, tingling, unilateral extremity weakness, syncope, shortness of breath, chest pain,  nausea, vomiting, diarrhea, hematuria, or other complaints.      Review of prior external notes (non-ED) -and- Review of prior external test results outside of this encounter:   August 14, 2024: Urinalysis reviewed.  Moderate amount of blood, 3-5 WBCs, no bacteria, squamous epithelial cells 7-12.      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    Microcytic anemia 07/18/2024    History of gestational diabetes 07/18/2024     Resolved Ambulatory Problems     Diagnosis Date Noted    Insulin controlled gestational diabetes mellitus (GDM) in third trimester 05/09/2024    Neurogenic bladder 12/10/2018     Past Medical History:   Diagnosis  Date    Anxiety     Chlamydia     Eczema     Gestational diabetes     History of back surgery 2019         PAST SURGICAL HISTORY  Past Surgical History:   Procedure Laterality Date    SPINE SURGERY           FAMILY HISTORY  Family History   Adopted: Yes   Problem Relation Age of Onset    Cancer Maternal Grandfather          SOCIAL HISTORY  Social History     Socioeconomic History    Marital status: Single   Tobacco Use    Smoking status: Never     Passive exposure: Never    Smokeless tobacco: Never   Vaping Use    Vaping status: Never Used   Substance and Sexual Activity    Alcohol use: Yes     Comment: occasional    Drug use: Never    Sexual activity: Yes         ALLERGIES  Other      REVIEW OF SYSTEMS  Included in HPI  All systems reviewed and negative except for those discussed in HPI.      PHYSICAL EXAM    I have reviewed the triage vital signs and nursing notes.    ED Triage Vitals   Temp Heart Rate Resp BP SpO2   08/16/24 1946 08/16/24 1946 08/16/24 1946 08/16/24 1952 08/16/24 1946   97.8 °F (36.6 °C) 105 16 123/72 96 %      Temp src Heart Rate Source Patient Position BP Location FiO2 (%)   08/16/24 1946 08/16/24 1946 08/16/24 1952 08/16/24 1952 --   Tympanic Monitor Sitting Right arm        GENERAL: not distressed  HENT: nares patent  Head/neck/ face are symmetric without gross deformity or swelling  EYES: no scleral icterus  CV: regular rhythm, regular rate with intact distal pulses  RESPIRATORY: normal effort and no respiratory distress  ABDOMEN: soft, tenderness to palpation of right lower quadrant of abdomen without rebound or guarding   MUSCULOSKELETAL: no deformity  NEURO: alert and appropriate, moves all extremities, follows commands  SKIN: warm, dry    Vital signs and nursing notes reviewed.      LAB RESULTS  Recent Results (from the past 24 hour(s))   Comprehensive Metabolic Panel    Collection Time: 08/16/24  8:14 PM    Specimen: Blood   Result Value Ref Range    Glucose 113 (H) 65 - 99 mg/dL    BUN  12 6 - 20 mg/dL    Creatinine 0.80 0.57 - 1.00 mg/dL    Sodium 145 136 - 145 mmol/L    Potassium 3.7 3.5 - 5.2 mmol/L    Chloride 110 (H) 98 - 107 mmol/L    CO2 23.0 22.0 - 29.0 mmol/L    Calcium 9.9 8.6 - 10.5 mg/dL    Total Protein 7.7 6.0 - 8.5 g/dL    Albumin 4.5 3.5 - 5.2 g/dL    ALT (SGPT) 33 1 - 33 U/L    AST (SGOT) 30 1 - 32 U/L    Alkaline Phosphatase 111 39 - 117 U/L    Total Bilirubin <0.2 0.0 - 1.2 mg/dL    Globulin 3.2 gm/dL    A/G Ratio 1.4 g/dL    BUN/Creatinine Ratio 15.0 7.0 - 25.0    Anion Gap 12.0 5.0 - 15.0 mmol/L    eGFR 107.7 >60.0 mL/min/1.73   hCG, Serum, Qualitative    Collection Time: 08/16/24  8:14 PM    Specimen: Blood   Result Value Ref Range    HCG Qualitative Negative Negative   Lipase    Collection Time: 08/16/24  8:14 PM    Specimen: Blood   Result Value Ref Range    Lipase 24 13 - 60 U/L   CBC Auto Differential    Collection Time: 08/16/24  8:14 PM    Specimen: Blood   Result Value Ref Range    WBC 7.73 3.40 - 10.80 10*3/mm3    RBC 4.90 3.77 - 5.28 10*6/mm3    Hemoglobin 12.4 12.0 - 15.9 g/dL    Hematocrit 38.1 34.0 - 46.6 %    MCV 77.8 (L) 79.0 - 97.0 fL    MCH 25.3 (L) 26.6 - 33.0 pg    MCHC 32.5 31.5 - 35.7 g/dL    RDW 17.3 (H) 12.3 - 15.4 %    RDW-SD 47.8 37.0 - 54.0 fl    MPV 10.0 6.0 - 12.0 fL    Platelets 303 140 - 450 10*3/mm3    Neutrophil % 67.5 42.7 - 76.0 %    Lymphocyte % 21.9 19.6 - 45.3 %    Monocyte % 7.0 5.0 - 12.0 %    Eosinophil % 2.8 0.3 - 6.2 %    Basophil % 0.5 0.0 - 1.5 %    Immature Grans % 0.3 0.0 - 0.5 %    Neutrophils, Absolute 5.22 1.70 - 7.00 10*3/mm3    Lymphocytes, Absolute 1.69 0.70 - 3.10 10*3/mm3    Monocytes, Absolute 0.54 0.10 - 0.90 10*3/mm3    Eosinophils, Absolute 0.22 0.00 - 0.40 10*3/mm3    Basophils, Absolute 0.04 0.00 - 0.20 10*3/mm3    Immature Grans, Absolute 0.02 0.00 - 0.05 10*3/mm3    nRBC 0.0 0.0 - 0.2 /100 WBC   Urinalysis With Microscopic If Indicated (No Culture) - Urine, Clean Catch    Collection Time: 08/16/24 10:08 PM     Specimen: Urine, Clean Catch   Result Value Ref Range    Color, UA Yellow Yellow, Straw    Appearance, UA Clear Clear    pH, UA 6.5 5.0 - 8.0    Specific Gravity, UA >1.030 (H) 1.005 - 1.030    Glucose, UA Negative Negative    Ketones, UA Negative Negative    Bilirubin, UA Negative Negative    Blood, UA Small (1+) (A) Negative    Protein, UA Negative Negative    Leuk Esterase, UA Negative Negative    Nitrite, UA Negative Negative    Urobilinogen, UA 1.0 E.U./dL 0.2 - 1.0 E.U./dL   Urinalysis, Microscopic Only - Urine, Clean Catch    Collection Time: 08/16/24 10:08 PM    Specimen: Urine, Clean Catch   Result Value Ref Range    RBC, UA 0-2 None Seen, 0-2 /HPF    WBC, UA 3-5 (A) None Seen, 0-2 /HPF    Bacteria, UA Trace (A) None Seen /HPF    Squamous Epithelial Cells, UA 7-12 (A) None Seen, 0-2 /HPF    Hyaline Casts, UA None Seen None Seen /LPF    Methodology Automated Microscopy          RADIOLOGY  CT Abdomen Pelvis With Contrast    Result Date: 8/16/2024  CT OF THE ABDOMEN/PELVIS WITH CONTRAST  HISTORY: Right lower quadrant pain and dysuria  COMPARISON: None available.  TECHNIQUE: Axial CT imaging was obtained through the abdomen and pelvis. IV contrast was administered.  FINDINGS: Images through the lung bases are clear. No suspicious hepatic lesions are seen. The stomach, duodenum, adrenal glands, spleen, pancreas, and gallbladder are all within normal limits. The kidneys enhance symmetrically. No hydronephrosis is seen. Images through the pelvis are degraded by motion artifact. Uterus and urinary bladder appear normal. No adnexal masses are seen. There is no bowel obstruction. The appendix is normal. There is a tiny fat-containing umbilical hernia. Stool burden does appear mildly increased, which may reflect some constipation. There are changes of prior posterior thoracolumbar spinal fusion. There is mild chronic appearing wedging deformity of L1.      Increased stool burden may reflect some constipation.   Radiation dose reduction techniques were utilized, including automated exposure control and exposure modulation based on body size.   This report was finalized on 8/16/2024 9:29 PM by Dr. Carmen Nguyen M.D on Workstation: [id number]         MEDICATIONS GIVEN IN ER  Medications   sodium chloride 0.9 % flush 10 mL (has no administration in time range)   sodium chloride 0.9 % bolus 1,000 mL (1,000 mL Intravenous New Bag 8/16/24 2016)   ketorolac (TORADOL) injection 15 mg (15 mg Intravenous Given 8/16/24 2104)   iopamidol (ISOVUE-300) 61 % injection 100 mL (85 mL Intravenous Given 8/16/24 2111)   ondansetron (ZOFRAN) injection 4 mg (4 mg Intravenous Given 8/16/24 2117)           OUTPATIENT MEDICATION MANAGEMENT:  Current Facility-Administered Medications Ordered in Epic   Medication Dose Route Frequency Provider Last Rate Last Admin    sodium chloride 0.9 % flush 10 mL  10 mL Intravenous PRN Pamela Morales APRN         Current Outpatient Medications Ordered in Epic   Medication Sig Dispense Refill    Drospirenone (Slynd) 4 MG tablet Take 1 tablet by mouth Daily. 28 tablet 11    prenatal vitamin (prenatal, CLASSIC, vitamin) tablet Take  by mouth Daily.      Accu-Chek Softclix Lancets lancets USE 1 LANCET TO GET BLOOD SAMPLE FOR BLOOD GLUCOSE TESTING FOUR TIMES DAILY (Patient not taking: Reported on 7/18/2024) 100 each 1    Blood Glucose Monitoring Suppl (Accu-Chek Guide) w/Device kit USE 1 KIT TO CHECK BLOOD GLUCOSE (Patient not taking: Reported on 7/18/2024)      docusate sodium (Colace) 100 MG capsule Take 1 capsule by mouth 2 (Two) Times a Day. 60 capsule 1    ferrous sulfate 325 (65 FE) MG tablet Take 1 tablet by mouth Daily With Breakfast. (Patient not taking: Reported on 7/18/2024) 30 tablet 2    ibuprofen (ADVIL,MOTRIN) 600 MG tablet Take 1 tablet by mouth Every 6 (Six) Hours As Needed for Mild Pain (First Line: Mild pain.). 60 tablet 0    Insulin Pen Needle (BD Pen Needle Melanie 2nd Gen) 32G X 4 MM misc Use  1 each 2 (Two) Times a Day. (Patient not taking: Reported on 7/18/2024) 120 each 1    polyethylene glycol (MIRALAX) 17 g packet Take 17 g by mouth Daily As Needed (constipation). 10 each 0       PROGRESS, DATA ANALYSIS, CONSULTS, AND MEDICAL DECISION MAKING  ORDERS PLACED DURING THIS VISIT:  Orders Placed This Encounter   Procedures    CT Abdomen Pelvis With Contrast    Comprehensive Metabolic Panel    hCG, Serum, Qualitative    Urinalysis With Microscopic If Indicated (No Culture) - Urine, Clean Catch    Lipase    CBC Auto Differential    Urinalysis, Microscopic Only - Urine, Clean Catch    Insert Peripheral IV    CBC & Differential       All labs have been independently interpreted by me.  All radiology studies have been reviewed by me. All EKG's have been independently viewed by me.  Discussion below represents my analysis of pertinent findings related to patient's condition, differential diagnosis, treatment plan and final disposition.    Differential diagnosis includes but is not limited to:   Appendicitis, UTI, ureteral stone, etc.    ED Course/Progress Notes/MDM:  ED Course as of 08/16/24 2241   Fri Aug 16, 2024   2029 I discussed the case with Dr. Longoria and he agrees to evaluate the patient at the bedside.    [AR]   2056 WBC: 7.73 [AR]   2057 HCG Qualitative: Negative [AR]   2228 CT scan independently interpreted by me shows no obvious perforation or obstruction. [DB]   2240 The patient was reexamined.  They have had symptomatic improvement during their ED stay.  I discussed today's findings with the patient, explaining the pertinent positives and negatives from today's visit, and the plan of care.  Discussed plan for discharge as there is no emergent indication for admission.  Discussed limitation of the ED work-up and that this is to rule out life-threatening emergencies but that they could require further testing as determined by their primary care and or any referred specialist patient is agreeable and  understands need for follow-up and repeat exam/testing.  Patient is aware that discharge does not mean there is nothing wrong, indicates no emergency is present, and that they must continue their care with their primary care physician and/or any referred specialist.  They were given appropriate follow-up with their primary care physician and/or specialist.  I had an extensive discussion on the expected clinical course and return precautions.  Patient understands to return to the emergency department for continuation, worsening, or new symptoms.  I answered any of the patient's questions. Patient was discharged home in a stable condition.     [AR]      ED Course User Index  [AR] Pamela Morales APRN  [DB] Miguel Ángel Longoria MD     Discharge instructions:  You have been given emergency department evaluation.  This evaluation is intended to rule out life-threatening conditions.  Is not a complete evaluation.  You could require further testing as determined by your primary care physician or any referred specialist.  Please follow-up with all doctors that you are referred to.  Please be sure to take your prescribed medications and follow any specific instructions in the discharge instructions.  Please follow-up with your primary care physician within 48 hours.  Please have your primary care provider recheck your blood pressure.  Rest.  Drink plenty of fluids, stay hydrated.  Increase fiber in your diet.  Please return to the emergency department if you experience chest pain, shortness of breath, abdominal pain, fever greater than 102, intractable vomiting.  Please return to the emergency department if your symptoms continue or worsen, or if you begin to experience any other concerning symptom.      AS OF 22:41 EDT VITALS:    BP - 129/84  HR - 59  TEMP - 97.8 °F (36.6 °C) (Tympanic)  O2 SATS - 99%    MDM:  Patient is a 23-year-old well-appearing female who presents to the emergency department complaints of right lower  quadrant abdominal pain.  -CT scan of abdomen and pelvis is negative for emergent findings.  Does reflect constipation.  Patient will be treated on an outpatient basis with MiraLAX.  -Labs unremarkable for emergent findings.  -Advised follow-up with PCP.  -Strict return precautions given.      COMPLEXITY OF CARE  Admission was considered but after careful review of the patient's presentation, physical examination, diagnostic results, and response to treatment the patient may be safely discharged with outpatient follow-up.        DIAGNOSIS  Final diagnoses:   Constipation, unspecified constipation type   Right lower quadrant abdominal pain         DISPOSITION  ED Disposition       ED Disposition   Discharge    Condition   Stable    Comment   --                  Please note that portions of this document were completed with a voice recognition program.    Note Disclaimer: At [facility], we believe that sharing information builds trust and better relationships. You are receiving this note because you recently visited [facility]. It is possible you will see health information before a provider has talked with you about it. This kind of information can be easy to misunderstand. To help you fully understand what it means for your health, we urge you to discuss this note with your provider.     Pamela Morales, SINTIA  08/16/24 [id number]

## 2024-09-11 ENCOUNTER — TELEPHONE (OUTPATIENT)
Dept: FAMILY MEDICINE CLINIC | Facility: CLINIC | Age: 24
End: 2024-09-11
Payer: COMMERCIAL

## 2024-09-11 NOTE — TELEPHONE ENCOUNTER
Caller: Abdon Andres    Relationship: Self    Best call back number: [phone]     Which medication are you concerned about: AMOXICILLIN    What are your concerns: PATIENT STATES THAT SHE WAS PRESCRIBED AMOXICILLIN AFTER GETTING HER WISDOM TEETH TAKEN OUT. SINCE THEN, SHE HAS HAD CRAMPS AND DIARRHEA, AND WAS CONCERNED. PLEASE CALL AND ADVISE

## 2024-10-08 ENCOUNTER — OFFICE VISIT (OUTPATIENT)
Dept: GASTROENTEROLOGY | Facility: CLINIC | Age: 24
End: 2024-10-08
Payer: COMMERCIAL

## 2024-10-08 ENCOUNTER — PREP FOR SURGERY (OUTPATIENT)
Dept: SURGERY | Facility: SURGERY CENTER | Age: 24
End: 2024-10-08
Payer: COMMERCIAL

## 2024-10-08 VITALS
HEIGHT: 68 IN | OXYGEN SATURATION: 98 % | BODY MASS INDEX: 26.8 KG/M2 | WEIGHT: 176.8 LBS | HEART RATE: 80 BPM | DIASTOLIC BLOOD PRESSURE: 80 MMHG | SYSTOLIC BLOOD PRESSURE: 120 MMHG | TEMPERATURE: 96.8 F

## 2024-10-08 DIAGNOSIS — R10.9 ABDOMINAL CRAMPING: ICD-10-CM

## 2024-10-08 DIAGNOSIS — K62.5 RECTAL BLEEDING: Primary | ICD-10-CM

## 2024-10-08 DIAGNOSIS — K62.5 RECTAL BLEEDING: ICD-10-CM

## 2024-10-08 DIAGNOSIS — K21.9 GASTROESOPHAGEAL REFLUX DISEASE, UNSPECIFIED WHETHER ESOPHAGITIS PRESENT: ICD-10-CM

## 2024-10-08 DIAGNOSIS — R11.2 NAUSEA AND VOMITING, UNSPECIFIED VOMITING TYPE: ICD-10-CM

## 2024-10-08 DIAGNOSIS — K21.9 GASTROESOPHAGEAL REFLUX DISEASE, UNSPECIFIED WHETHER ESOPHAGITIS PRESENT: Primary | ICD-10-CM

## 2024-10-08 PROCEDURE — 99214 OFFICE O/P EST MOD 30 MIN: CPT | Performed by: NURSE PRACTITIONER

## 2024-10-08 RX ORDER — SODIUM CHLORIDE 0.9 % (FLUSH) 0.9 %
3 SYRINGE (ML) INJECTION EVERY 12 HOURS SCHEDULED
OUTPATIENT
Start: 2024-10-08

## 2024-10-08 RX ORDER — SODIUM CHLORIDE, SODIUM LACTATE, POTASSIUM CHLORIDE, CALCIUM CHLORIDE 600; 310; 30; 20 MG/100ML; MG/100ML; MG/100ML; MG/100ML
30 INJECTION, SOLUTION INTRAVENOUS CONTINUOUS PRN
OUTPATIENT
Start: 2024-10-08 | End: 2024-10-10

## 2024-10-08 RX ORDER — SODIUM CHLORIDE 0.9 % (FLUSH) 0.9 %
10 SYRINGE (ML) INJECTION AS NEEDED
OUTPATIENT
Start: 2024-10-08

## 2024-10-08 NOTE — PATIENT INSTRUCTIONS
Schedule EGD and colonoscopy for further evaluation.     Schedule RUQ ultrasound to evaluate the gallbladder.     For constipation, recommend starting MiraLAX daily available over-the-counter.  Mix 1 capful in 8 ounces of noncarbonated liquid and drink once daily.     For gas, recommend trial of low FODMAP diet.  Handout provided for review.

## 2024-10-08 NOTE — PROGRESS NOTES
"Chief Complaint   Patient presents with    Abdominal Pain    GI Bleeding         History of Present Illness  Patient is a 24-year-old female who presents today for evaluation, referred for blood in the stool.  She had a baby via vaginal delivery May 29.  She is currently breast-feeding.  She had a CT scan August 2024 that showed increased stool burden suggesting constipation, otherwise no acute findings.    Patient presents today for evaluation.  She has been experiencing rectal bleeding over the last several months since her baby was born.  Blood has been occurring with each bowel movement.  It described as being bright red and present in the bowl and with wiping.  At times she has pain when she passes stool.    She has been constipated, generally having around 2 bowel movements per week.  In September she had 3 episodes of fecal incontinence.    She tried MiraLAX which did help and Colace with no improvement.    She has also been experiencing abdominal cramping.  This is also new since her baby was warm.  It is primarily present to the lower abdomen in the right lower quadrant.    More long-term, has been experiencing issues with gas pain.  At times this can be severe.  This has been present since she was in high school.  She also has been experiencing heartburn and reflux over the last few years.    She has had no prior abdominal surgeries.    She was adopted and does not know her family history.     Result Review :       Comprehensive Metabolic Panel (08/16/2024 20:14)    CBC & Differential (08/16/2024 20:14)    CT Abdomen Pelvis With Contrast (08/16/2024 21:11)    Postpartum Visit with Nelson Moeller MD (07/12/2024)    Referral to Gastroenterology for Bloody stool (07/12/2024)    Vital Signs:   /80   Pulse 80   Temp 96.8 °F (36 °C)   Ht 172.7 cm (67.99\")   Wt 80.2 kg (176 lb 12.8 oz)   SpO2 98%   BMI 26.89 kg/m²     Body mass index is 26.89 kg/m².     Physical Exam  Vitals reviewed. "   Constitutional:       General: She is not in acute distress.     Appearance: She is well-developed.   HENT:      Head: Normocephalic and atraumatic.   Pulmonary:      Effort: Pulmonary effort is normal. No respiratory distress.   Abdominal:      General: Abdomen is flat. Bowel sounds are normal. There is no distension.      Palpations: Abdomen is soft.      Tenderness: There is abdominal tenderness in the right lower quadrant and suprapubic area.   Skin:     General: Skin is dry.      Coloration: Skin is not pale.   Neurological:      Mental Status: She is alert and oriented to person, place, and time.   Psychiatric:         Thought Content: Thought content normal.           Assessment and Plan    Diagnoses and all orders for this visit:    1. Rectal bleeding (Primary)    2. Gastroesophageal reflux disease, unspecified whether esophagitis present    3. Nausea and vomiting, unspecified vomiting type  -     US Abdomen Limited; Future    4. Abdominal cramping  -     US Abdomen Limited; Future         Discussion  Patient presents today for evaluation with concerns about rectal bleeding, abdominal cramping, constipation, nausea, vomiting, and reflux.  Recommended EGD to assess for any evidence of esophagitis, gastritis, peptic ulcer disease, H. pylori infection, or celiac disease.  Recommended colonoscopy to evaluate source of bleeding and assess for any evidence of inflammatory bowel disease.  We will also schedule right upper quadrant ultrasound to evaluate the gallbladder to assess for any evidence of cholelithiasis or cholecystitis which she would be at risk for due to recent pregnancy.    While testing is being completed, discussed constipation could be contributing some to her discomfort and recommended taking MiraLAX daily to see if more regular bowel movements would lead to improvement in pain.  Also recommended trial of low FODMAP diet to help with gas.          Follow Up   Return for Follow up to review  results after testing complete.    Patient Instructions   Schedule EGD and colonoscopy for further evaluation.     Schedule RUQ ultrasound to evaluate the gallbladder.     For constipation, recommend starting MiraLAX daily available over-the-counter.  Mix 1 capful in 8 ounces of noncarbonated liquid and drink once daily.     For gas, recommend trial of low FODMAP diet.  Handout provided for review.

## 2024-10-09 ENCOUNTER — TELEPHONE (OUTPATIENT)
Dept: GASTROENTEROLOGY | Facility: CLINIC | Age: 24
End: 2024-10-09
Payer: COMMERCIAL

## 2024-10-09 NOTE — TELEPHONE ENCOUNTER
Provider: DR ALBERTO MCKEON    Caller: JORDON DODGE    Relationship to Patient: SELF    Phone Number: [phone]     Reason for Call: PT IS SCHEDULED FOR A COLONOSCOPY AND EGD ON 11/18/24 AND SHE IS WONDERING IF THERE IS SOMETHING SOONER DUE TO HAVING PAIN IN ABDOMIN ON THE RIGHT SIDE PLEASE ADVISE AND CALL PT BACK

## 2024-10-17 ENCOUNTER — OFFICE VISIT (OUTPATIENT)
Dept: FAMILY MEDICINE CLINIC | Facility: CLINIC | Age: 24
End: 2024-10-17
Payer: COMMERCIAL

## 2024-10-17 ENCOUNTER — HOSPITAL ENCOUNTER (OUTPATIENT)
Facility: HOSPITAL | Age: 24
Discharge: HOME | End: 2024-10-17
Admitting: NURSE PRACTITIONER
Payer: COMMERCIAL

## 2024-10-17 VITALS
OXYGEN SATURATION: 97 % | WEIGHT: 178.4 LBS | BODY MASS INDEX: 27.04 KG/M2 | DIASTOLIC BLOOD PRESSURE: 78 MMHG | TEMPERATURE: 97.8 F | HEART RATE: 85 BPM | SYSTOLIC BLOOD PRESSURE: 116 MMHG | HEIGHT: 68 IN

## 2024-10-17 DIAGNOSIS — R11.2 NAUSEA AND VOMITING, UNSPECIFIED VOMITING TYPE: ICD-10-CM

## 2024-10-17 DIAGNOSIS — F41.8 SITUATIONAL ANXIETY: ICD-10-CM

## 2024-10-17 DIAGNOSIS — R10.9 ABDOMINAL CRAMPING: Primary | ICD-10-CM

## 2024-10-17 DIAGNOSIS — H92.01 RIGHT EAR PAIN: ICD-10-CM

## 2024-10-17 DIAGNOSIS — R07.89 ATYPICAL CHEST PAIN: ICD-10-CM

## 2024-10-17 DIAGNOSIS — R03.0 ELEVATED BLOOD PRESSURE READING: ICD-10-CM

## 2024-10-17 DIAGNOSIS — R10.9 ABDOMINAL CRAMPING: ICD-10-CM

## 2024-10-17 PROCEDURE — 93000 ELECTROCARDIOGRAM COMPLETE: CPT

## 2024-10-17 PROCEDURE — 99214 OFFICE O/P EST MOD 30 MIN: CPT

## 2024-10-17 PROCEDURE — 76705 ECHO EXAM OF ABDOMEN: CPT

## 2024-10-17 RX ORDER — ALUMINUM ZIRCONIUM OCTACHLOROHYDREX GLY 16 G/100G
GEL TOPICAL 3 TIMES DAILY
Qty: 660 G | Refills: 5 | Status: SHIPPED | OUTPATIENT
Start: 2024-10-17

## 2024-10-17 RX ORDER — SACCHAROMYCES BOULARDII 250 MG
250 CAPSULE ORAL 2 TIMES DAILY
Qty: 60 CAPSULE | Refills: 2 | Status: SHIPPED | OUTPATIENT
Start: 2024-10-17

## 2024-10-17 NOTE — PROGRESS NOTES
"Chief Complaint  GI Problem, Headache (Nose bleed with a headache/), and Earache (Right ear hurts/)    Subjective        Abdon Andres presents to [facility]  History of Present Illness  Patient is a 24 y.o. female who presents to the office today for abdominal pain, headache, and right earache.    She is followed by SINTIA Bates with gastroenterology and was last seen on 10/8/2024 for rectal bleeding and constipation. She is scheduled for EGD and colonoscopy and had right upper quadrant ultrasound completed.  It was recommended she take MiraLAX daily and trial low FODMAP diet.  Abdominal ultrasound results are pending.  He has had right lower quadrant pain since August and was seen in the ED. CT scan August 2024 that showed increased stool burden suggesting constipation, otherwise no acute findings.  She states that the pain has not increased or changed since August. LMP 2 weeks ago, denies chance of pregancy. Denies dysuria or malodorous urine.  The pain is a cramping and is associated with increased flatulence.  She has not tried a low FODMAP diet yet.    She also complains of a headache with nose bleed a month ago and her blood pressure was elevated at the time. She waited an hour and took it again and it was normal. States she notices chest pain last week when she was driving and she felt like she couldn't catch her breath. She states that driving makes her anxious.  EKG is normal.    Right ear pain started last week. She denies drainage or fever.           Objective   Vital Signs:  /78 (BP Location: Left arm, Patient Position: Sitting, Cuff Size: Adult)   Pulse 85   Temp 97.8 °F (36.6 °C) (Infrared)   Ht 172.7 cm (67.99\")   Wt 80.9 kg (178 lb 6.4 oz)   SpO2 97%   BMI 27.13 kg/m²   Estimated body mass index is 27.13 kg/m² as calculated from the following:    Height as of this encounter: 172.7 cm (67.99\").    Weight as of this encounter: 80.9 kg (178 lb 6.4 " oz).            Physical Exam  Constitutional:       Appearance: Normal appearance.   HENT:      Head: Normocephalic.   Cardiovascular:      Rate and Rhythm: Normal rate and regular rhythm.      Heart sounds: Normal heart sounds.   Pulmonary:      Effort: Pulmonary effort is normal.      Breath sounds: Normal breath sounds.   Abdominal:      General: Abdomen is flat. Bowel sounds are normal. There is no distension.      Palpations: Abdomen is soft. There is no mass.      Tenderness: There is abdominal tenderness (RLQ). There is no guarding.   Musculoskeletal:      Cervical back: Neck supple.      Right lower leg: No edema.      Left lower leg: No edema.   Neurological:      Mental Status: She is alert and oriented to person, place, and time.   Psychiatric:         Mood and Affect: Mood normal.        Result Review :  The following data was reviewed by: SINTIA Peguero on 10/17/2024:  Common labs          7/18/2024    11:27 8/14/2024    20:04 8/16/2024    20:14   Common Labs   Glucose 67  93  113    BUN 12  12  12    Creatinine 0.69  0.71  0.80    Sodium 142  141  145    Potassium 4.0  3.8  3.7    Chloride 102  107  110    Calcium 9.8  9.4  9.9    Total Protein 7.4      Albumin 4.5   4.5    Total Bilirubin 0.4   <0.2    Alkaline Phosphatase 100   111    AST (SGOT) 29   30    ALT (SGPT) 30   33    WBC 7.7  7.94  7.73    Hemoglobin 12.7  12.5  12.4    Hematocrit 39.8  38.2  38.1    Platelets 312  292  303    Hemoglobin A1C 5.3             ECG 12 Lead    Date/Time: 10/17/2024 4:49 PM  Performed by: Aria Higgins APRN    Authorized by: Aria Higgins APRN  Comparison: not compared with previous ECG   Previous ECG: no previous ECG available  Rhythm: sinus rhythm  Rate: normal  BPM: 80  Conduction: conduction normal  ST Segments: ST segments normal  T Waves: T waves normal  QRS axis: normal  Other: no other findings    Clinical impression: normal ECG            Assessment and Plan   Diagnoses and all orders  for this visit:    1. Abdominal cramping (Primary)  Assessment & Plan:  Continue follow-up with gastroenterology.  Recommended fiber supplement, probiotic, and low FODMAP diet.  Continue MiraLAX as recommended by gastroenterology.  Proceed with EGD and colonoscopy.    Orders:  -     psyllium (Metamucil Smooth Texture) 58.6 % powder; Take  by mouth 3 (Three) Times a Day.  Dispense: 660 g; Refill: 5  -     saccharomyces boulardii (Florastor) 250 MG capsule; Take 1 capsule by mouth 2 (Two) Times a Day.  Dispense: 60 capsule; Refill: 2    2. Elevated blood pressure reading  Assessment & Plan:  Continue to monitor blood pressure at home.  Call the office if blood pressure is elevated higher than 130/80.      3. Atypical chest pain  -     ECG 12 Lead    4. Right ear pain  Assessment & Plan:  Ear exam is normal.  Continue to monitor.  May take over-the-counter Advil or Tylenol.      5. Situational anxiety  Assessment & Plan:  EKG normal. Most likely the cause of chest pain and shortness of breath. Return to office if symptoms worsen.       Patient agrees with plan of care and understands instructions. Call if worsening symptoms or any problems or concerns.     EMR Dragon/Transcription Disclaimer:  Much of this encounter note is an electronic transcription/translation of spoken language to printed text.  The electronic translation of spoken language may permit erroneous, or at times, nonsensical words or phrases to be inadvertently transcribed; Although I have reviewed the note for such errors, some may still exist.           Follow Up   Return for Next scheduled follow up.  Patient was given instructions and counseling regarding her condition or for health maintenance advice. Please see specific information pulled into the AVS if appropriate.

## 2024-10-21 PROBLEM — H92.01 RIGHT EAR PAIN: Status: ACTIVE | Noted: 2024-10-21

## 2024-10-21 PROBLEM — F41.8 SITUATIONAL ANXIETY: Status: ACTIVE | Noted: 2024-10-21

## 2024-10-21 PROBLEM — R07.89 ATYPICAL CHEST PAIN: Status: ACTIVE | Noted: 2024-10-21

## 2024-10-21 PROBLEM — R03.0 ELEVATED BLOOD PRESSURE READING: Status: ACTIVE | Noted: 2024-10-21

## 2024-10-21 NOTE — ASSESSMENT & PLAN NOTE
Continue follow-up with gastroenterology.  Recommended fiber supplement, probiotic, and low FODMAP diet.  Continue MiraLAX as recommended by gastroenterology.  Proceed with EGD and colonoscopy.

## 2024-10-21 NOTE — ASSESSMENT & PLAN NOTE
EKG normal. Most likely the cause of chest pain and shortness of breath. Return to office if symptoms worsen.

## 2024-10-21 NOTE — ASSESSMENT & PLAN NOTE
Continue to monitor blood pressure at home.  Call the office if blood pressure is elevated higher than 130/80.

## 2024-10-29 ENCOUNTER — LAB REQUISITION (OUTPATIENT)
Dept: LAB | Facility: HOSPITAL | Age: 24
End: 2024-10-29
Payer: COMMERCIAL

## 2024-10-29 DIAGNOSIS — K21.9 GASTROESOPHAGEAL REFLUX DISEASE WITHOUT ESOPHAGITIS: ICD-10-CM

## 2024-10-29 PROCEDURE — 88305 TISSUE EXAM BY PATHOLOGIST: CPT | Performed by: INTERNAL MEDICINE

## 2024-10-30 LAB
CYTO UR: NORMAL
LAB AP CASE REPORT: NORMAL
LAB AP DIAGNOSIS COMMENT: NORMAL
PATH REPORT.FINAL DX SPEC: NORMAL
PATH REPORT.GROSS SPEC: NORMAL

## 2024-11-04 ENCOUNTER — TELEPHONE (OUTPATIENT)
Dept: GASTROENTEROLOGY | Facility: CLINIC | Age: 24
End: 2024-11-04
Payer: COMMERCIAL

## 2024-11-04 DIAGNOSIS — K31.9 MUCOSAL ABNORMALITY OF DUODENUM: Primary | ICD-10-CM

## 2024-11-04 NOTE — TELEPHONE ENCOUNTER
Hub staff attempted to follow warm transfer process and was unsuccessful     Caller: JORDON DODGE    Relationship to patient: SELF    Best call back number: [phone]    Patient is needing: PATIENT STATED AFTER HAVING THE PROCEDURE DONE SHE'S STILL HAVING PAIN IN HER ABDOMIN, GAS, TROUBLE SLEEPING AND NAUSEOUS.     PATIENT STATED TO LEAVE A VOICEMAIL IF SHE CAN'T ANSWER.

## 2024-11-05 ENCOUNTER — TELEPHONE (OUTPATIENT)
Dept: GASTROENTEROLOGY | Facility: CLINIC | Age: 24
End: 2024-11-05
Payer: COMMERCIAL

## 2024-11-05 RX ORDER — OMEPRAZOLE 40 MG/1
40 CAPSULE, DELAYED RELEASE ORAL DAILY
Qty: 30 CAPSULE | Refills: 5 | Status: SHIPPED | OUTPATIENT
Start: 2024-11-05

## 2024-11-05 NOTE — TELEPHONE ENCOUNTER
Please start omeprazole for esophagitis seen on recent EGD.  Prescription sent to pharmacy.    I would also recommend checking a celiac antibody panel, I placed order and she can come to lab to have blood work drawn at her convenience.

## 2024-11-06 ENCOUNTER — LAB (OUTPATIENT)
Dept: LAB | Facility: HOSPITAL | Age: 24
End: 2024-11-06
Payer: COMMERCIAL

## 2024-11-06 DIAGNOSIS — R19.8 ABNORMAL FINDINGS ON ESOPHAGOGASTRODUODENOSCOPY (EGD): Primary | ICD-10-CM

## 2024-11-06 PROCEDURE — 36415 COLL VENOUS BLD VENIPUNCTURE: CPT | Performed by: INTERNAL MEDICINE

## 2024-11-06 PROCEDURE — 82784 ASSAY IGA/IGD/IGG/IGM EACH: CPT | Performed by: NURSE PRACTITIONER

## 2024-11-06 PROCEDURE — 86364 TISS TRNSGLTMNASE EA IG CLAS: CPT | Performed by: NURSE PRACTITIONER

## 2024-11-06 PROCEDURE — 86231 EMA EACH IG CLASS: CPT | Performed by: NURSE PRACTITIONER

## 2024-11-07 LAB
ENDOMYSIUM IGA SER QL: NEGATIVE
IGA SERPL-MCNC: 246 MG/DL (ref 87–352)
TTG IGA SER-ACNC: <2 U/ML (ref 0–3)

## 2024-12-09 DIAGNOSIS — R14.1 GAS PAIN: Primary | ICD-10-CM

## 2024-12-26 ENCOUNTER — TELEPHONE (OUTPATIENT)
Dept: OBSTETRICS AND GYNECOLOGY | Facility: CLINIC | Age: 24
End: 2024-12-26
Payer: COMMERCIAL

## 2024-12-26 NOTE — TELEPHONE ENCOUNTER
Pt called stating she was having sharp pain when standing and sitting down.Pt approximately 6 or 7 wks pregnant. I ask our nurse and she said to let pt know to drink water, take some tylenol, and use a heating pad. If symptoms worsen then pt needs to go to ED.   Pt aware

## 2024-12-31 ENCOUNTER — INITIAL PRENATAL (OUTPATIENT)
Dept: OBSTETRICS AND GYNECOLOGY | Facility: CLINIC | Age: 24
End: 2024-12-31
Payer: COMMERCIAL

## 2024-12-31 VITALS — SYSTOLIC BLOOD PRESSURE: 111 MMHG | DIASTOLIC BLOOD PRESSURE: 68 MMHG | BODY MASS INDEX: 26.77 KG/M2 | WEIGHT: 176 LBS

## 2024-12-31 DIAGNOSIS — Z34.91 INITIAL OBSTETRIC VISIT IN FIRST TRIMESTER: Primary | ICD-10-CM

## 2024-12-31 LAB
B-HCG UR QL: POSITIVE
EXPIRATION DATE: ABNORMAL
INTERNAL NEGATIVE CONTROL: ABNORMAL
INTERNAL POSITIVE CONTROL: ABNORMAL
Lab: ABNORMAL

## 2024-12-31 RX ORDER — DIPHENHYDRAMINE HYDROCHLORIDE 25 MG/1
25 CAPSULE ORAL 3 TIMES DAILY
Qty: 90 TABLET | Refills: 3 | Status: SHIPPED | OUTPATIENT
Start: 2024-12-31

## 2024-12-31 NOTE — PROGRESS NOTES
CC: Initial obstetric visit    HPI: 24-year-old  2 para 1 presents at 7 and 1 sevenths weeks by an uncertain last menstrual cycle for her initial obstetric visit.  She does have some nausea, but minimal vomiting.  Her obstetric history is significant for insulin requiring gestational diabetes with the last pregnancy.  The patient had a workup with her primary care physician and was not diagnosed with type 2 diabetes.  Her last baby was born vaginally.    Review of systems    This is negative for fever or chills.  Positive for nausea without significant vomiting.  Negative for abdominal or pelvic pain.    Assessment and plan    1.  Intrauterine pregnancy in the first trimester.  The patient is 7 to 8 weeks by history and physical examination.  I recommend an ultrasound to confirm estimated gestational age and the patient agrees.  2.  Prenatal counseling was undertaken and prenatal vitamins prescribed.  Prenatal labs have been ordered.  3.  Insulin requiring gestational diabetes with the last pregnancy.  The patient has been evaluated by her primary care physician and was not found to be a diabetic.  We will check hemoglobin A1c today.  4.  Bloody stools last spring.  This was evaluated by gastroenterology and was attributed to a hemorrhoid.  5.  Counseled regarding genetic screening.  The patient would like to proceed with noninvasive prenatal screening when it is gestational age appropriate.

## 2025-01-01 LAB — HBA1C MFR BLD: 5.6 % (ref 4.8–5.6)

## 2025-01-02 LAB
HGB A MFR BLD ELPH: 97.5 % (ref 96.4–98.8)
HGB A2 MFR BLD ELPH: 2.5 % (ref 1.8–3.2)
HGB F MFR BLD ELPH: 0 % (ref 0–2)
HGB FRACT BLD-IMP: NORMAL
HGB S MFR BLD ELPH: 0 %

## 2025-01-03 LAB
ABO GROUP BLD: ABNORMAL
AMPHETAMINES UR QL SCN: NEGATIVE NG/ML
BARBITURATES UR QL SCN: NEGATIVE NG/ML
BASOPHILS # BLD AUTO: 0 X10E3/UL (ref 0–0.2)
BASOPHILS NFR BLD AUTO: 0 %
BENZODIAZ UR QL: NEGATIVE NG/ML
BLD GP AB SCN SERPL QL: NEGATIVE
BZE UR QL: NEGATIVE NG/ML
CANNABINOIDS UR QL SCN: NEGATIVE NG/ML
EOSINOPHIL # BLD AUTO: 0.1 X10E3/UL (ref 0–0.4)
EOSINOPHIL NFR BLD AUTO: 1 %
ERYTHROCYTE [DISTWIDTH] IN BLOOD BY AUTOMATED COUNT: 13.6 % (ref 11.7–15.4)
HBV SURFACE AG SERPL QL IA: NEGATIVE
HCT VFR BLD AUTO: 40.5 % (ref 34–46.6)
HCV AB SERPL QL IA: NORMAL
HCV IGG SERPL QL IA: NON REACTIVE
HGB BLD-MCNC: 12.8 G/DL (ref 11.1–15.9)
HIV 1+2 AB+HIV1 P24 AG SERPL QL IA: NON REACTIVE
IMM GRANULOCYTES # BLD AUTO: 0 X10E3/UL (ref 0–0.1)
IMM GRANULOCYTES NFR BLD AUTO: 0 %
LYMPHOCYTES # BLD AUTO: 1.1 X10E3/UL (ref 0.7–3.1)
LYMPHOCYTES NFR BLD AUTO: 13 %
MCH RBC QN AUTO: 25.2 PG (ref 26.6–33)
MCHC RBC AUTO-ENTMCNC: 31.6 G/DL (ref 31.5–35.7)
MCV RBC AUTO: 80 FL (ref 79–97)
METHADONE UR QL SCN: NEGATIVE NG/ML
MONOCYTES # BLD AUTO: 0.5 X10E3/UL (ref 0.1–0.9)
MONOCYTES NFR BLD AUTO: 6 %
NEUTROPHILS # BLD AUTO: 6.5 X10E3/UL (ref 1.4–7)
NEUTROPHILS NFR BLD AUTO: 80 %
OPIATES UR QL: NEGATIVE NG/ML
PCP UR QL SCN: NEGATIVE NG/ML
PLATELET # BLD AUTO: 332 X10E3/UL (ref 150–450)
PROPOXYPH UR QL SCN: NEGATIVE NG/ML
RBC # BLD AUTO: 5.08 X10E6/UL (ref 3.77–5.28)
RH BLD: POSITIVE
RUBV IGG SERPL IA-ACNC: 4.9 INDEX
TREPONEMA PALLIDUM IGG+IGM AB [PRESENCE] IN SERUM OR PLASMA BY IMMUNOASSAY: NON REACTIVE
WBC # BLD AUTO: 8.2 X10E3/UL (ref 3.4–10.8)

## 2025-01-04 LAB
BACTERIA UR CULT: NORMAL
BACTERIA UR CULT: NORMAL

## 2025-01-06 LAB
C TRACH RRNA CVX QL NAA+PROBE: NEGATIVE
CFDNA.FET/CFDNA.TOTAL SFR FETUS: NORMAL %
CITATION REF LAB TEST: NORMAL
CYTOLOGIST CVX/VAG CYTO: NORMAL
CYTOLOGY CVX/VAG DOC CYTO: NORMAL
CYTOLOGY CVX/VAG DOC THIN PREP: NORMAL
DX ICD CODE: NORMAL
FET 13+18+21+X+Y ANEUP PLAS.CFDNA: NORMAL
GA EST FROM CONCEPTION DATE: NORMAL D
GESTATIONAL AGE > 9:: NO
HPV I/H RISK 4 DNA CVX QL PROBE+SIG AMP: NEGATIVE
LAB DIRECTOR NAME PROVIDER: NORMAL
LAB DIRECTOR NAME PROVIDER: NORMAL
LABORATORY COMMENT REPORT: NORMAL
LIMITATIONS OF THE TEST: NORMAL
Lab: NORMAL
Lab: NORMAL
N GONORRHOEA RRNA CVX QL NAA+PROBE: NEGATIVE
NOTE: NORMAL
OTHER STN SPEC: NORMAL
PERFORMANCE CHARACTERISTICS: NORMAL
REF LAB TEST METHOD: NORMAL
STAT OF ADQ CVX/VAG CYTO-IMP: NORMAL
T VAGINALIS RRNA SPEC QL NAA+PROBE: NEGATIVE
TEST PERFORMANCE INFO SPEC: NORMAL

## 2025-01-10 ENCOUNTER — TELEPHONE (OUTPATIENT)
Dept: OBSTETRICS AND GYNECOLOGY | Facility: CLINIC | Age: 25
End: 2025-01-10

## 2025-01-10 ENCOUNTER — TELEPHONE (OUTPATIENT)
Dept: OBSTETRICS AND GYNECOLOGY | Facility: CLINIC | Age: 25
End: 2025-01-10
Payer: COMMERCIAL

## 2025-01-10 NOTE — TELEPHONE ENCOUNTER
Caller: Abdon Andres    Relationship:  Self    Best call back number: [phone] (home)       PATIENT CALLED REQUESTING TO CANCEL SAME DAY APPT.    Did the patient call AFTER the start time of their scheduled appointment?  []YES  [x]NO    Was the patient's appointment rescheduled? [x]YES  []NO    Any additional information: PT WAS UNABLE TO GET OFF WORK FOR HER OB DATING ULTRASOUND TODAY. SHE HAS RESCHEDULED FOR MONDAY 01/13.

## 2025-01-11 NOTE — TELEPHONE ENCOUNTER
Patient called after-hours line with concerns of a small amount of mucousy/snotty discharge when wiping when using the restroom.    Patient is roughly 8 weeks by dates.  She has not at had an ultrasound this pregnancy.  In our system there is not any type of confirmation of pregnancy such as blood or serum pregnancy test.    Patient does report having intercourse within the last 24 hours.    I explained to the patient that serious emergencies in early pregnancy would only rarely present with a mucousy discharge.  Explained that more concerning symptoms would be severe pain or bleeding of any kind.  She is not having either of these.  I explained that it is most likely that the mucousy discharge is related to recent intercourse.  Reassurance is offered.    I did offer the patient the caveat that my ability to provide information is limited due to not knowing exactly how far along she is in pregnancy.

## 2025-01-27 ENCOUNTER — ROUTINE PRENATAL (OUTPATIENT)
Dept: OBSTETRICS AND GYNECOLOGY | Facility: CLINIC | Age: 25
End: 2025-01-27
Payer: COMMERCIAL

## 2025-01-27 VITALS — DIASTOLIC BLOOD PRESSURE: 82 MMHG | SYSTOLIC BLOOD PRESSURE: 136 MMHG | WEIGHT: 180 LBS | BODY MASS INDEX: 27.38 KG/M2

## 2025-01-27 DIAGNOSIS — Z3A.11 11 WEEKS GESTATION OF PREGNANCY: Primary | ICD-10-CM

## 2025-01-27 LAB
GLUCOSE UR STRIP-MCNC: NEGATIVE MG/DL
PROT UR STRIP-MCNC: NEGATIVE MG/DL

## 2025-01-27 RX ORDER — MAGNESIUM OXIDE 400 MG/1
400 TABLET ORAL DAILY
Qty: 30 TABLET | Refills: 11 | Status: SHIPPED | OUTPATIENT
Start: 2025-01-27

## 2025-01-27 RX ORDER — DIPHENHYDRAMINE HYDROCHLORIDE 25 MG/1
25 CAPSULE ORAL 3 TIMES DAILY
Qty: 90 TABLET | Refills: 3 | Status: SHIPPED | OUTPATIENT
Start: 2025-01-27

## 2025-01-27 NOTE — PROGRESS NOTES
CC: Obstetric visit    HPI: 24-year-old  2 para 1 presents at 11-4/7 weeks for her obstetric visit.  She reports that her cycle was uncertain and would like to use the ultrasound for gestational dating.  Next, her nausea has improved, but not completely resolved.  Next, the patient reports of intermittent headaches which respond to Tylenol, but return after the Tylenol wears off.  Next, the patient has episodes of dizziness and lightheadedness.  These are intermittent.  Next, the patient has occasional nosebleeds.    Review of systems    This is positive for intermittent headaches.  Positive for intermittent nosebleeds.  Positive for nausea.  Negative for significant vomiting.    Physical examination    The abdomen is soft and gravid.  There is no epigastric tenderness.  No fundal tenderness.  No CVA tenderness.  No suprapubic tenderness.  Extremities are equal in size    Assessment and plan    1.  Intrauterine pregnancy at 11-4/7 weeks  2.  Prenatal labs were reviewed and discussed with the patient.  3.  Counseled regarding genetic screening.  The patient would like to do free fetal DNA testing, but requests the small panel rather than the larger panel.  This has been ordered.  4.  Headaches in pregnancy.  Counseled regarding this condition and treatment options.  I recommend magnesium oxide and the patient agrees.  A prescription has been sent.  5. Episodes of dizziness and lightheadedness.  We discussed optimizing fluid intake in pregnancy.  We also discussed frequent small meals to avoid hypoglycemic episodes.  6.  Episodes of nosebleeds.  These are not severe.  We discussed the use of Ocean Spray nasal spray.  7.  History of gestational diabetes.  The patient reports that she was not considered a type II diabetic between pregnancies.  Hemoglobin A1c at the initial obstetric visit was normal.  8.  Due to episodes of dizziness, lightheadedness and nosebleeds, I recommend follow-up in 2 weeks rather than 4.   The patient agrees.

## 2025-02-11 ENCOUNTER — ROUTINE PRENATAL (OUTPATIENT)
Dept: OBSTETRICS AND GYNECOLOGY | Facility: CLINIC | Age: 25
End: 2025-02-11
Payer: COMMERCIAL

## 2025-02-11 VITALS — BODY MASS INDEX: 27.68 KG/M2 | DIASTOLIC BLOOD PRESSURE: 86 MMHG | WEIGHT: 182 LBS | SYSTOLIC BLOOD PRESSURE: 146 MMHG

## 2025-02-11 DIAGNOSIS — R51.9 PREGNANCY HEADACHE IN SECOND TRIMESTER: Primary | ICD-10-CM

## 2025-02-11 DIAGNOSIS — O26.892 PREGNANCY HEADACHE IN SECOND TRIMESTER: Primary | ICD-10-CM

## 2025-02-11 NOTE — PROGRESS NOTES
CC: Obstetric visit    HPI: 24-year-old  2 para 1 presents at 13-5/7 weeks for her obstetric visit.  She continues to have headaches.  She has tried Tylenol as well as magnesium oxide with only minimal improvement.  Otherwise, she is feeling well.    Review of systems    This is negative for fever or chills.  Negative for nausea or vomiting.  Positive for headaches.    Physical examination    The abdomen is soft and gravid.  There is no epigastric tenderness.  No right upper quadrant tenderness.  No fundal tenderness.  No CVA tenderness.  No suprapubic tenderness.  Extremities are equal in size    Assessment and plan    1.  Intrauterine pregnancy at 13-5/7 weeks  2.  Free fetal DNA testing is negative.  The patient would like to proceed with AFP testing when it is gestational age appropriate.  3.  Persistent headaches despite Tylenol and magnesium oxide.  Counseled.  We discussed the benefits of a neurology consultation.  The patient would like to proceed.  A referral has been sent.  4.  Unable to Doppler fetal heart tones at bedside.  Bedside ultrasound revealed an active intrauterine fetus with a fetal heart rate in the 150s.

## 2025-02-17 ENCOUNTER — TELEPHONE (OUTPATIENT)
Dept: OBSTETRICS AND GYNECOLOGY | Facility: CLINIC | Age: 25
End: 2025-02-17
Payer: COMMERCIAL

## 2025-02-17 ENCOUNTER — HOSPITAL ENCOUNTER (EMERGENCY)
Facility: HOSPITAL | Age: 25
Discharge: HOME | End: 2025-02-17
Attending: EMERGENCY MEDICINE | Admitting: EMERGENCY MEDICINE
Payer: COMMERCIAL

## 2025-02-17 VITALS
DIASTOLIC BLOOD PRESSURE: 65 MMHG | OXYGEN SATURATION: 100 % | TEMPERATURE: 98.7 F | HEART RATE: 74 BPM | SYSTOLIC BLOOD PRESSURE: 106 MMHG | RESPIRATION RATE: 16 BRPM

## 2025-02-17 DIAGNOSIS — O26.899 ABDOMINAL PAIN AFFECTING PREGNANCY: ICD-10-CM

## 2025-02-17 DIAGNOSIS — Z3A.14 PREGNANCY WITH 14 COMPLETED WEEKS GESTATION: ICD-10-CM

## 2025-02-17 DIAGNOSIS — N39.0 ACUTE UTI: Primary | ICD-10-CM

## 2025-02-17 DIAGNOSIS — R10.9 ABDOMINAL PAIN AFFECTING PREGNANCY: ICD-10-CM

## 2025-02-17 LAB
ALBUMIN SERPL-MCNC: 3.5 G/DL (ref 3.5–5.2)
ALBUMIN/GLOB SERPL: 0.8 G/DL
ALP SERPL-CCNC: 76 U/L (ref 39–117)
ALT SERPL W P-5'-P-CCNC: 16 U/L (ref 1–33)
ANION GAP SERPL CALCULATED.3IONS-SCNC: 10.8 MMOL/L (ref 5–15)
AST SERPL-CCNC: 16 U/L (ref 1–32)
BACTERIA UR QL AUTO: ABNORMAL /HPF
BASOPHILS # BLD AUTO: 0.03 10*3/MM3 (ref 0–0.2)
BASOPHILS NFR BLD AUTO: 0.3 % (ref 0–1.5)
BILIRUB SERPL-MCNC: <0.2 MG/DL (ref 0–1.2)
BILIRUB UR QL STRIP: NEGATIVE
BUN SERPL-MCNC: 10 MG/DL (ref 6–20)
BUN/CREAT SERPL: 14.9 (ref 7–25)
CALCIUM SPEC-SCNC: 9.9 MG/DL (ref 8.6–10.5)
CHLORIDE SERPL-SCNC: 102 MMOL/L (ref 98–107)
CLARITY UR: ABNORMAL
CO2 SERPL-SCNC: 22.2 MMOL/L (ref 22–29)
COLOR UR: YELLOW
CREAT SERPL-MCNC: 0.67 MG/DL (ref 0.57–1)
DEPRECATED RDW RBC AUTO: 43 FL (ref 37–54)
EGFRCR SERPLBLD CKD-EPI 2021: 126.9 ML/MIN/1.73
EOSINOPHIL # BLD AUTO: 0.1 10*3/MM3 (ref 0–0.4)
EOSINOPHIL NFR BLD AUTO: 1.2 % (ref 0.3–6.2)
ERYTHROCYTE [DISTWIDTH] IN BLOOD BY AUTOMATED COUNT: 15.3 % (ref 12.3–15.4)
GLOBULIN UR ELPH-MCNC: 4.3 GM/DL
GLUCOSE SERPL-MCNC: 140 MG/DL (ref 65–99)
GLUCOSE UR STRIP-MCNC: NEGATIVE MG/DL
GRAN CASTS URNS QL MICRO: ABNORMAL /LPF
HCG SERPL QL: POSITIVE
HCT VFR BLD AUTO: 37.4 % (ref 34–46.6)
HGB BLD-MCNC: 11.9 G/DL (ref 12–15.9)
HGB UR QL STRIP.AUTO: NEGATIVE
HOLD SPECIMEN: NORMAL
HYALINE CASTS UR QL AUTO: ABNORMAL /LPF
IMM GRANULOCYTES # BLD AUTO: 0.04 10*3/MM3 (ref 0–0.05)
IMM GRANULOCYTES NFR BLD AUTO: 0.5 % (ref 0–0.5)
KETONES UR QL STRIP: NEGATIVE
LEUKOCYTE ESTERASE UR QL STRIP.AUTO: ABNORMAL
LIPASE SERPL-CCNC: 18 U/L (ref 13–60)
LYMPHOCYTES # BLD AUTO: 1.22 10*3/MM3 (ref 0.7–3.1)
LYMPHOCYTES NFR BLD AUTO: 14.2 % (ref 19.6–45.3)
MCH RBC QN AUTO: 25 PG (ref 26.6–33)
MCHC RBC AUTO-ENTMCNC: 31.8 G/DL (ref 31.5–35.7)
MCV RBC AUTO: 78.6 FL (ref 79–97)
MONOCYTES # BLD AUTO: 0.4 10*3/MM3 (ref 0.1–0.9)
MONOCYTES NFR BLD AUTO: 4.7 % (ref 5–12)
NEUTROPHILS NFR BLD AUTO: 6.8 10*3/MM3 (ref 1.7–7)
NEUTROPHILS NFR BLD AUTO: 79.1 % (ref 42.7–76)
NITRITE UR QL STRIP: NEGATIVE
NRBC BLD AUTO-RTO: 0 /100 WBC (ref 0–0.2)
PH UR STRIP.AUTO: 7 [PH] (ref 5–8)
PLATELET # BLD AUTO: 296 10*3/MM3 (ref 140–450)
PMV BLD AUTO: 10.1 FL (ref 6–12)
POTASSIUM SERPL-SCNC: 3.9 MMOL/L (ref 3.5–5.2)
PROT SERPL-MCNC: 7.8 G/DL (ref 6–8.5)
PROT UR QL STRIP: NEGATIVE
RBC # BLD AUTO: 4.76 10*6/MM3 (ref 3.77–5.28)
RBC # UR STRIP: ABNORMAL /HPF
REF LAB TEST METHOD: ABNORMAL
SODIUM SERPL-SCNC: 135 MMOL/L (ref 136–145)
SP GR UR STRIP: 1.02 (ref 1–1.03)
SQUAMOUS #/AREA URNS HPF: ABNORMAL /HPF
UROBILINOGEN UR QL STRIP: ABNORMAL
WBC # UR STRIP: ABNORMAL /HPF
WBC NRBC COR # BLD AUTO: 8.59 10*3/MM3 (ref 3.4–10.8)
WHOLE BLOOD HOLD COAG: NORMAL
WHOLE BLOOD HOLD SPECIMEN: NORMAL

## 2025-02-17 PROCEDURE — 81001 URINALYSIS AUTO W/SCOPE: CPT

## 2025-02-17 PROCEDURE — 36415 COLL VENOUS BLD VENIPUNCTURE: CPT

## 2025-02-17 PROCEDURE — 85025 COMPLETE CBC W/AUTO DIFF WBC: CPT

## 2025-02-17 PROCEDURE — 84703 CHORIONIC GONADOTROPIN ASSAY: CPT

## 2025-02-17 PROCEDURE — 80053 COMPREHEN METABOLIC PANEL: CPT

## 2025-02-17 PROCEDURE — 99283 EMERGENCY DEPT VISIT LOW MDM: CPT

## 2025-02-17 PROCEDURE — 83690 ASSAY OF LIPASE: CPT

## 2025-02-17 RX ORDER — CEPHALEXIN 500 MG/1
500 CAPSULE ORAL 3 TIMES DAILY
Qty: 21 CAPSULE | Refills: 0 | Status: SHIPPED | OUTPATIENT
Start: 2025-02-17 | End: 2025-02-24

## 2025-02-17 RX ORDER — SODIUM CHLORIDE 0.9 % (FLUSH) 0.9 %
10 SYRINGE (ML) INJECTION AS NEEDED
Status: DISCONTINUED | OUTPATIENT
Start: 2025-02-17 | End: 2025-02-17 | Stop reason: HOSPADM

## 2025-02-17 NOTE — ED PROVIDER NOTES
EMERGENCY DEPARTMENT ENCOUNTER    Room Number:  HA1/A  Date seen:  2/17/2025  Time seen: 15:02 EST  PCP: Aria Higgins APRN  Historian: patient    Discussed/obtained information from independent historians: n/a    HPI:  Chief complaint:abdominal pain  A complete HPI/ROS/PMH/PSH/SH/FH are unobtainable due to: n/a  Context:Abdon Andres is a 24 y.o. female G2, P1 currently 14 weeks pregnant who presents to the ED with c/o acute abdominal pain described as pressure and sometimes sharp.  She has sensation of needing to urinate and taking a while to urinate and when she does she feels better.  Pain also made better by lying down. She denies any vaginal bleeding, abnormal vaginal discharge or concerns for STI's.     External (non-ED) record review:  I reviewed recent visits in [facility].  Patient did call her OB/GYN office this morning.  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    Microcytic anemia 07/18/2024    History of gestational diabetes 07/18/2024    Gastroesophageal reflux disease 10/08/2024    Nausea and vomiting 10/08/2024    Rectal bleeding 10/08/2024    Abdominal cramping 10/08/2024    Elevated blood pressure reading 10/21/2024    Atypical chest pain 10/21/2024    Right ear pain 10/21/2024    Situational anxiety 10/21/2024     Resolved Ambulatory Problems     Diagnosis Date Noted    Insulin controlled gestational diabetes mellitus (GDM) in third trimester 05/09/2024    Neurogenic bladder 12/10/2018     Past Medical History:   Diagnosis Date    Anxiety     Chlamydia     Eczema     Gestational diabetes     History of back  surgery 2019       PAST SURGICAL HISTORY  Past Surgical History:   Procedure Laterality Date    SPINE SURGERY         FAMILY HISTORY  Family History   Adopted: Yes   Problem Relation Age of Onset    Cancer Maternal Grandfather        SOCIAL HISTORY  Social History     Socioeconomic History    Marital status: Single   Tobacco Use    Smoking status: Never     Passive exposure: Never    Smokeless tobacco: Never   Vaping Use    Vaping status: Never Used   Substance and Sexual Activity    Alcohol use: Yes     Comment: occasional    Drug use: Never    Sexual activity: Yes       REVIEW OF SYSTEMS  Review of Systems    All systems reviewed and negative except for those discussed in HPI.     PHYSICAL EXAM    I have reviewed the triage vital signs and nursing notes.  Vitals:    02/17/25 1703   BP: 106/65   Pulse: 74   Resp:    Temp:    SpO2: 100%     Physical Exam    GENERAL: not distressed  HENT: nares patent  EYES: no scleral icterus  NECK: no ROM limitations  CV: regular rhythm, regular rate, no murmur  RESPIRATORY: normal effort, CTAB  ABDOMEN: soft, Gravid.  No focal tenderness in any quadrant  : deferred  MUSCULOSKELETAL: no deformity  NEURO: alert, moves all extremities, follows commands  SKIN: warm, dry    LAB RESULTS  Recent Results (from the past 24 hours)   Comprehensive Metabolic Panel    Collection Time: 02/17/25  1:47 PM    Specimen: Arm, Left; Blood   Result Value Ref Range    Glucose 140 (H) 65 - 99 mg/dL    BUN 10 6 - 20 mg/dL    Creatinine 0.67 0.57 - 1.00 mg/dL    Sodium 135 (L) 136 - 145 mmol/L    Potassium 3.9 3.5 - 5.2 mmol/L    Chloride 102 98 - 107 mmol/L    CO2 22.2 22.0 - 29.0 mmol/L    Calcium 9.9 8.6 - 10.5 mg/dL    Total Protein 7.8 6.0 - 8.5 g/dL    Albumin 3.5 3.5 - 5.2 g/dL    ALT (SGPT) 16 1 - 33 U/L    AST (SGOT) 16 1 - 32 U/L    Alkaline Phosphatase 76 39 - 117 U/L    Total Bilirubin <0.2 0.0 - 1.2 mg/dL    Globulin 4.3 gm/dL    A/G Ratio 0.8 g/dL    BUN/Creatinine Ratio 14.9 7.0 - 25.0     Anion Gap 10.8 5.0 - 15.0 mmol/L    eGFR 126.9 >60.0 mL/min/1.73   Lipase    Collection Time: 02/17/25  1:47 PM    Specimen: Arm, Left; Blood   Result Value Ref Range    Lipase 18 13 - 60 U/L   hCG, Serum, Qualitative    Collection Time: 02/17/25  1:47 PM    Specimen: Arm, Left; Blood   Result Value Ref Range    HCG Qualitative Positive (A) Negative   Green Top (Gel)    Collection Time: 02/17/25  1:47 PM   Result Value Ref Range    Extra Tube Hold for add-ons.    Lavender Top    Collection Time: 02/17/25  1:47 PM   Result Value Ref Range    Extra Tube hold for add-on    Light Blue Top    Collection Time: 02/17/25  1:47 PM   Result Value Ref Range    Extra Tube Hold for add-ons.    CBC Auto Differential    Collection Time: 02/17/25  1:47 PM    Specimen: Arm, Left; Blood   Result Value Ref Range    WBC 8.59 3.40 - 10.80 10*3/mm3    RBC 4.76 3.77 - 5.28 10*6/mm3    Hemoglobin 11.9 (L) 12.0 - 15.9 g/dL    Hematocrit 37.4 34.0 - 46.6 %    MCV 78.6 (L) 79.0 - 97.0 fL    MCH 25.0 (L) 26.6 - 33.0 pg    MCHC 31.8 31.5 - 35.7 g/dL    RDW 15.3 12.3 - 15.4 %    RDW-SD 43.0 37.0 - 54.0 fl    MPV 10.1 6.0 - 12.0 fL    Platelets 296 140 - 450 10*3/mm3    Neutrophil % 79.1 (H) 42.7 - 76.0 %    Lymphocyte % 14.2 (L) 19.6 - 45.3 %    Monocyte % 4.7 (L) 5.0 - 12.0 %    Eosinophil % 1.2 0.3 - 6.2 %    Basophil % 0.3 0.0 - 1.5 %    Immature Grans % 0.5 0.0 - 0.5 %    Neutrophils, Absolute 6.80 1.70 - 7.00 10*3/mm3    Lymphocytes, Absolute 1.22 0.70 - 3.10 10*3/mm3    Monocytes, Absolute 0.40 0.10 - 0.90 10*3/mm3    Eosinophils, Absolute 0.10 0.00 - 0.40 10*3/mm3    Basophils, Absolute 0.03 0.00 - 0.20 10*3/mm3    Immature Grans, Absolute 0.04 0.00 - 0.05 10*3/mm3    nRBC 0.0 0.0 - 0.2 /100 WBC   Urinalysis With Microscopic If Indicated (No Culture) - Urine, Clean Catch    Collection Time: 02/17/25  1:49 PM    Specimen: Urine, Clean Catch   Result Value Ref Range    Color, UA Yellow Yellow, Straw    Appearance, UA Turbid (A) Clear    pH,  UA 7.0 5.0 - 8.0    Specific Gravity, UA 1.019 1.005 - 1.030    Glucose, UA Negative Negative    Ketones, UA Negative Negative    Bilirubin, UA Negative Negative    Blood, UA Negative Negative    Protein, UA Negative Negative    Leuk Esterase, UA Small (1+) (A) Negative    Nitrite, UA Negative Negative    Urobilinogen, UA 1.0 E.U./dL 0.2 - 1.0 E.U./dL   Urinalysis, Microscopic Only - Urine, Clean Catch    Collection Time: 02/17/25  1:49 PM    Specimen: Urine, Clean Catch   Result Value Ref Range    RBC, UA 0-2 None Seen, 0-2 /HPF    WBC, UA 6-10 (A) None Seen, 0-2 /HPF    Bacteria, UA Trace (A) None Seen /HPF    Squamous Epithelial Cells, UA 3-6 (A) None Seen, 0-2 /HPF    Hyaline Casts, UA 0-2 None Seen /LPF    Granular Casts, UA 3-6 None Seen /LPF    Methodology Manual Light Microscopy        Ordered the above labs and independently interpreted results.  My findings will be discussed in the ED course or medical decision making section below    RADIOLOGY RESULTS  No Radiology Exams Resulted Within Past 24 Hours     Ordered the above noted radiological studies.  Independently interpreted by me.  My findings will be discussed in the medical decision section below.     PROGRESS, DATA ANALYSIS, CONSULTS AND MEDICAL DECISION MAKING    Please note that this section constitutes my independent interpretation of clinical data including lab results, radiology, EKG's.  This constitutes my independent professional opinion regarding differential diagnosis and management of this patient.  It may include any factors such as history from outside sources, review of external records, social determinants of health, management of medications, response to those treatments, and discussions with other providers.    ED Course as of 02/17/25 1722   Mon Feb 17, 2025   1631 Limited bedside ultrasound performed by me shows active fetus with obvious cardiac activity.  [EW]      ED Course User Index  [EW] Delia Garcia APRN     Orders placed  during this visit:  Orders Placed This Encounter   Procedures    Banks Draw    Comprehensive Metabolic Panel    Lipase    Urinalysis With Microscopic If Indicated (No Culture) - Urine, Clean Catch    hCG, Serum, Qualitative    CBC Auto Differential    Urinalysis, Microscopic Only - Urine, Clean Catch    NPO Diet NPO Type: Strict NPO    Undress & Gown    Monitor fetal heart tones    Vital Signs Recheck    Fetal Heart Tones - Notify Provider of Findings    Insert Peripheral IV    CBC & Differential    Green Top (Gel)    Lavender Top    Light Blue Top            Medical Decision Making  Problems Addressed:  Abdominal pain affecting pregnancy: complicated acute illness or injury  Acute UTI: complicated acute illness or injury  Pregnancy with 14 completed weeks gestation: complicated acute illness or injury    Amount and/or Complexity of Data Reviewed  Labs: ordered.    Risk  Prescription drug management.    Pt currently 14 weeks pregnant with some lower abdominal pain and urinary urgency and sensation she is not emptying her bladder all the way.  Blood work reassuring, she has a hemoglobin 11.9 which is ok for pregnancy.  She is not having any vaginal bleeding or uterine type cramping.  Due to urinary symptoms considered UTI and she has had these frequently in her pregnancy.  This patient presents with symptoms consistent with acute uncomplicated cystitis. No systemic symptoms. Not septic. Well appearing. Low suspicion for acute pyelonephritis given lack of fever, CVAT, or systemic features. Fetal status reassuring with FHT and limited bedside ultrasound.  RTER precautions advised and will plan to treat with Keflex. Considered but think unlikely she has any ovarian torsion based on her symptoms. She has a confirmed IUP from prior ultrasound 1/13/2025 so an ectopic pregnancy excluded.       DIAGNOSIS  Final diagnoses:   Pregnancy with 14 completed weeks gestation   Abdominal pain affecting pregnancy   Acute UTI           Medication List        New Prescriptions      cephalexin 500 MG capsule  Commonly known as: KEFLEX  Take 1 capsule by mouth 3 (Three) Times a Day for 7 days.               Where to Get Your Medications        These medications were sent to [id number] - [place], [place] - [address] AT [facility] - [phone]  - [phone]  [address] [address]      Phone: [phone]   cephalexin 500 MG capsule         FOLLOW-UP  Nelson Moeller MD  [address]  [address]  [place] [id number] [phone]    Schedule an appointment as soon as possible for a visit           Latest Documented Vital Signs:  As of 17:22 EST  BP- 106/65 HR- 74 Temp- 98.7 °F (37.1 °C) O2 sat- 100%    Appropriate PPE utilized throughout this patient encounter to include mask, if indicated, per current protocol. Hand hygiene was performed before donning PPE and after removal when leaving the room.    Please note that portions of this were completed with a voice recognition program.     Note Disclaimer: At [facility], we believe that sharing information builds trust and better relationships. You are receiving this note because you are receiving care at [facility] or recently visited. It is possible you will see health information before a provider has talked with you about it. This kind of information can be easy to misunderstand. To help you fully understand what it means for your health, we urge you to discuss this note with your provider.                 Delia Garcia, APRN  02/17/25 0255

## 2025-02-17 NOTE — DISCHARGE INSTRUCTIONS
Always wipe front to back  Wear cotton panties  Wear no panties to bed  Urinate after intercourse    Antibiotics as prescribed    Tylenol for pain    Return Precautions    Although you are being discharged from the ED today, I encourage you to return for worsening symptoms.  Things can, and do, change such that treatment at home with medication may not be adequate.      Specifically, return for any of the following:    Chest pain, shortness of breath, pain or nausea and vomiting not controlled by medications provided.    Please make a follow up with your Primary Care Provider for a blood pressure recheck.

## 2025-02-17 NOTE — TELEPHONE ENCOUNTER
Dr Moeller/ Dr Munson not coming in today to cover messages. Patient is 15 weeks pregnant. Last night noticed pressure in abdomen. Feels like needs to urinate but can't. Wants to come and leave sample. If this is OK would you please put order in system please. Thank You. Pt Ph [phone]

## 2025-02-17 NOTE — ED NOTES
Pt c/o lower abd pain that feels similar to a UTI, she feels like she still has urine even post void.

## 2025-02-26 ENCOUNTER — OFFICE VISIT (OUTPATIENT)
Dept: FAMILY MEDICINE CLINIC | Facility: CLINIC | Age: 25
End: 2025-02-26
Payer: COMMERCIAL

## 2025-02-26 ENCOUNTER — ROUTINE PRENATAL (OUTPATIENT)
Dept: OBSTETRICS AND GYNECOLOGY | Facility: CLINIC | Age: 25
End: 2025-02-26
Payer: COMMERCIAL

## 2025-02-26 VITALS
SYSTOLIC BLOOD PRESSURE: 120 MMHG | BODY MASS INDEX: 27.93 KG/M2 | TEMPERATURE: 98 F | HEART RATE: 101 BPM | HEIGHT: 68 IN | OXYGEN SATURATION: 98 % | WEIGHT: 184.3 LBS | DIASTOLIC BLOOD PRESSURE: 68 MMHG

## 2025-02-26 VITALS — DIASTOLIC BLOOD PRESSURE: 72 MMHG | BODY MASS INDEX: 28.29 KG/M2 | WEIGHT: 186 LBS | SYSTOLIC BLOOD PRESSURE: 120 MMHG

## 2025-02-26 DIAGNOSIS — T78.40XA ALLERGIC REACTION, INITIAL ENCOUNTER: Primary | ICD-10-CM

## 2025-02-26 DIAGNOSIS — Z3A.15 15 WEEKS GESTATION OF PREGNANCY: Primary | ICD-10-CM

## 2025-02-26 DIAGNOSIS — N30.00 ACUTE CYSTITIS WITHOUT HEMATURIA: ICD-10-CM

## 2025-02-26 RX ORDER — CETIRIZINE HYDROCHLORIDE 10 MG/1
10 TABLET ORAL DAILY
Qty: 30 TABLET | Refills: 2 | Status: SHIPPED | OUTPATIENT
Start: 2025-02-26

## 2025-02-26 RX ORDER — GRANULES FOR ORAL 3 G/1
3 POWDER ORAL ONCE
Qty: 3 G | Refills: 0 | Status: SHIPPED | OUTPATIENT
Start: 2025-02-26 | End: 2025-02-26

## 2025-02-26 NOTE — PROGRESS NOTES
CC: Obstetric visit    HPI: 24-year-old  2 para 1 presents at 15-6/7 weeks for her obstetric visit.  She reports that she has begun to experience fetal movement.  She had some tingling and swelling of the tongue which caused her to visit with her primary care provider.  She was diagnosed with an allergic reaction to antibiotics that had been given for UTI.  She has been given Zyrtec and fosfomycin.  She is feeling better.  Her headaches did not resolve with magnesium oxide.    Review of systems    This is positive for intermittent headaches.  Positive for recent allergic reaction, now resolving.    Physical examination    The abdomen is soft and gravid.  There is no epigastric tenderness.  No fundal tenderness.  No CVA tenderness.  No suprapubic tenderness.  Extremities are equal in size    Assessment and plan    1.  Intrauterine pregnancy at 15-6/7 weeks  2.  Counseled regarding AFP testing for spina bifida.  The patient would like to proceed.  This has been ordered.  3.  Screening ultrasound at the next visit.  4.  Recent allergic reaction to antibiotics.  The patient has been treated with Zyrtec and her antibiotic has been changed to fosfomycin.  She is feeling better.  5.  Pregnancy related headaches which have not responded to magnesium oxide.  The patient has a neurology appointment scheduled soon.

## 2025-02-26 NOTE — ASSESSMENT & PLAN NOTE
A prescription for Zyrtec has been provided, to be taken daily. She has been advised to keep Benadryl on hand for immediate use should she experience tongue swelling. A referral to an allergist has been made for further testing to identify the potential allergen. The current antibiotic will be discontinued and replaced with fosfomycin. She has been instructed to avoid triggering foods until the cause of her symptoms can be determined. In the event of shortness of breath, tightness, or significant swelling, she has been advised to seek immediate medical attention at the ER.

## 2025-02-26 NOTE — ASSESSMENT & PLAN NOTE
The patient was previously prescribed cephalexin for a UTI. Given the potential contribution of the antibiotic to her tongue swelling, the current antibiotic will be discontinued and replaced with fosfomycin, to be taken as a single dose.

## 2025-02-26 NOTE — PROGRESS NOTES
"Chief Complaint  Tongue Swelling (Started Monday /Frequently swells up )    Tavo Andres presents to [facility]              History of Present Illness  The patient is a 24-year-old female who presents today for tongue swelling.    She reports intermittent episodes of unexplained tongue swelling, which occasionally are accompanied by a sensation of throat tightness. The most recent episode occurred 2 days ago after she started cephalexin for a UTI. She notes that the swelling has since subsided, but was present yesterday and the day before. She does not experience any respiratory distress or audible wheezing during these episodes. She has not undergone any allergy testing and is not currently on any allergy medications. She does not report any febrile episodes or hives. She does not report any rashes or itching, although she does have a history of eczema. She does not report any chest pain. She has observed that her symptoms tend to occur after consuming Mexican food, such as tacos with green sauce and Chipotle.    She is currently 16 weeks pregnant and is taking magnesium, prenatal vitamins, and vitamin B6.         Objective   Vital Signs:  /68 (BP Location: Left arm, Patient Position: Sitting, Cuff Size: Adult)   Pulse 101   Temp 98 °F (36.7 °C) (Infrared)   Ht 172.7 cm (67.99\")   Wt 83.6 kg (184 lb 4.8 oz)   SpO2 98%   BMI 28.03 kg/m²   Estimated body mass index is 28.03 kg/m² as calculated from the following:    Height as of this encounter: 172.7 cm (67.99\").    Weight as of this encounter: 83.6 kg (184 lb 4.8 oz).          Physical Exam  Constitutional:       Appearance: Normal appearance.   HENT:      Head: Normocephalic.      Mouth/Throat:      Mouth: Mucous membranes are moist. No angioedema.      Comments: No visible tongue swelling   Cardiovascular:      Rate and Rhythm: Normal rate and regular rhythm.      Heart sounds: Normal heart " sounds.   Pulmonary:      Effort: Pulmonary effort is normal.      Breath sounds: Normal breath sounds.   Musculoskeletal:      Cervical back: Neck supple.      Right lower leg: No edema.      Left lower leg: No edema.   Neurological:      Mental Status: She is alert and oriented to person, place, and time.   Psychiatric:         Mood and Affect: Mood normal.        Result Review :  The following data was reviewed by: SINTIA Peguero on 02/26/2025:  Common labs          8/16/2024    20:14 12/31/2024    09:21 2/17/2025    13:47   Common Labs   Glucose 113   140    BUN 12   10    Creatinine 0.80   0.67    Sodium 145   135    Potassium 3.7   3.9    Chloride 110   102    Calcium 9.9   9.9    Albumin 4.5   3.5    Total Bilirubin <0.2   <0.2    Alkaline Phosphatase 111   76    AST (SGOT) 30   16    ALT (SGPT) 33   16    WBC 7.73  8.2  8.59    Hemoglobin 12.4  12.8  11.9    Hematocrit 38.1  40.5  37.4    Platelets 303  332  296    Hemoglobin A1C  5.6                 Assessment and Plan   Diagnoses and all orders for this visit:    1. Allergic reaction, initial encounter (Primary)  Assessment & Plan:  A prescription for Zyrtec has been provided, to be taken daily. She has been advised to keep Benadryl on hand for immediate use should she experience tongue swelling. A referral to an allergist has been made for further testing to identify the potential allergen. The current antibiotic will be discontinued and replaced with fosfomycin. She has been instructed to avoid triggering foods until the cause of her symptoms can be determined. In the event of shortness of breath, tightness, or significant swelling, she has been advised to seek immediate medical attention at the ER.    Orders:  -     Ambulatory Referral to Allergy  -     cetirizine (zyrTEC) 10 MG tablet; Take 1 tablet by mouth Daily.  Dispense: 30 tablet; Refill: 2    2. Acute cystitis without hematuria  Assessment & Plan:  The patient was previously prescribed  cephalexin for a UTI. Given the potential contribution of the antibiotic to her tongue swelling, the current antibiotic will be discontinued and replaced with fosfomycin, to be taken as a single dose.     Orders:  -     fosfomycin (MONUROL) 3 g pack; Take 3 g by mouth 1 (One) Time for 1 dose.  Dispense: 3 g; Refill: 0           Patient agrees with plan of care and understands instructions. Call if worsening symptoms or any problems or concerns.     EMR Dragon/Transcription Disclaimer:  Much of this encounter note is an electronic transcription/translation of spoken language to printed text.  The electronic translation of spoken language may permit erroneous, or at times, nonsensical words or phrases to be inadvertently transcribed; Although I have reviewed the note for such errors, some may still exist.              Follow Up   Return in about 4 weeks (around 3/26/2025) for Recheck.  Patient was given instructions and counseling regarding her condition or for health maintenance advice. Please see specific information pulled into the AVS if appropriate.     Patient or patient representative verbalized consent for the use of Ambient Listening during the visit with  SINTIA Peguero for chart documentation. 2/26/2025  14:48 EST

## 2025-02-28 LAB
AFP INTERP SERPL-IMP: NORMAL
AFP INTERP SERPL-IMP: NORMAL
AFP MOM SERPL: 1.66
AFP SERPL-MCNC: 52 NG/ML
AGE AT DELIVERY: 22.9 YR
GA METHOD: NORMAL
GA: 15.9 WEEKS
IDDM PATIENT QL: NO
LABORATORY COMMENT REPORT: NORMAL
MULTIPLE PREGNANCY: NO
NEURAL TUBE DEFECT RISK FETUS: 3604 %
RESULT: NORMAL

## 2025-03-24 ENCOUNTER — OFFICE VISIT (OUTPATIENT)
Dept: NEUROLOGY | Facility: CLINIC | Age: 25
End: 2025-03-24
Payer: COMMERCIAL

## 2025-03-24 ENCOUNTER — TELEPHONE (OUTPATIENT)
Dept: OBSTETRICS AND GYNECOLOGY | Facility: CLINIC | Age: 25
End: 2025-03-24
Payer: COMMERCIAL

## 2025-03-24 VITALS
BODY MASS INDEX: 28.95 KG/M2 | DIASTOLIC BLOOD PRESSURE: 66 MMHG | HEIGHT: 68 IN | WEIGHT: 191 LBS | SYSTOLIC BLOOD PRESSURE: 110 MMHG

## 2025-03-24 DIAGNOSIS — M54.81 BILATERAL OCCIPITAL NEURALGIA: Primary | ICD-10-CM

## 2025-03-24 PROCEDURE — 99214 OFFICE O/P EST MOD 30 MIN: CPT | Performed by: NURSE PRACTITIONER

## 2025-03-24 NOTE — TELEPHONE ENCOUNTER
Referred to physical therapy. Cannot get an appointment until May. Wondering if any other options or does she have to wait until May? Thank You.

## 2025-03-24 NOTE — PROGRESS NOTES
DOS: 3/24/2025  NAME: Abdon Andres   : 2000  PCP: Aria Higgins APRN  Chief Complaint   Patient presents with    Headache      Referring MD: Nelson Moeller MD    Neurological Problem and Interval History:  24 y.o. female with a Hx of anxiety, spine surgery (2019) after an injury (fell out of a window), headaches. She is currently 5 months pregnant. She presents for evaluation for her headaches. She says that she has pain in her back as well that has been chronic that she attributes to her injury in 2019. She says that the headaches began not long after that but have worsened with pregnancy. This is her second pregnancy. She also has a one year old. She is due in August with a boy. She says the location of her headache changes but they are mostly occipital and bitemporal. She has some neck pain as well that seems to trigger the headaches. She has tried magnesium without any improvement. She has photophobia. Her blood pressure today was 110/66. She does report frequently feeling lightheaded during her pregnancy. Says this is worse in the mornings and if she is in a hot shower.     Past Medical/Surgical Hx:  Past Medical History:   Diagnosis Date    Anxiety     Chlamydia     Eczema     Gestational diabetes     insulin dependent    History of back surgery 2019    Fell out of window and broke back    Insulin controlled gestational diabetes mellitus (GDM) in third trimester 2024    Neurogenic bladder 12/10/2018     Past Surgical History:   Procedure Laterality Date    SPINE SURGERY         Review of Systems:        Review of Systems   Eyes:  Positive for photophobia and visual disturbance.   Neurological:  Positive for dizziness, light-headedness and headache. Negative for tremors, seizures, syncope, facial asymmetry, speech difficulty, weakness, numbness, memory problem and confusion.      Medications    Current Outpatient Medications:     cetirizine (zyrTEC) 10 MG tablet, Take 1 tablet by mouth  Daily., Disp: 30 tablet, Rfl: 2    Prenatal MV & Min w/FA-DHA (Prenatal Gummies) 0.18-25 MG chewable tablet, Chew 1 tablet Daily., Disp: 30 tablet, Rfl: 11    magnesium oxide (MAG-OX) 400 MG tablet, Take 1 tablet by mouth Daily. (Patient not taking: Reported on 3/24/2025), Disp: 30 tablet, Rfl: 11    vitamin B-6 (PYRIDOXINE) 25 MG tablet, Take 1 tablet by mouth 3 times a day. (Patient not taking: Reported on 3/24/2025), Disp: 90 tablet, Rfl: 3     Allergies:  Allergies   Allergen Reactions    Cephalexin Swelling    Other Rash     UTI medication (prescribed by hospital)    UTI medication (prescribed by hospital)   UTI medication (prescribed by hospital)       Social Hx:  Social History     Socioeconomic History    Marital status: Single   Tobacco Use    Smoking status: Never     Passive exposure: Never    Smokeless tobacco: Never   Vaping Use    Vaping status: Never Used   Substance and Sexual Activity    Alcohol use: Yes     Comment: occasional    Drug use: Never    Sexual activity: Yes       Family Hx:  Family History   Adopted: Yes   Problem Relation Age of Onset    Cancer Maternal Grandfather        I have reviewed and confirmed the accuracy of the patient's history: Chief complaint, HPI, ROS, Subjective, and Past Family Social History as entered by the MA/MONIKAN/RN. Salma Richter MA 03/24/25      Review of Imaging (Interpretation of scans not reports):      Laboratory Results:       Physical Examination:  General Appearance:  Well developed, well nourished, well groomed, alert, and cooperative.  HEENT: Normocephalic.    Neck and Spine: Normal range of motion.  Normal alignment. No mass or tenderness. No bruits.  Cardiac: Regular rate and rhythm. No murmurs.  Peripheral Vasculature: Radial and pedal pulses are equal and symmetric.   Extremities: No edema or deformities. Normal joint ROM.  Skin: No rashes or birth marks.      Neurological examination:   Higher Integrative Function: Oriented to time, place, and person.  Normal registration, recall attention span and concentration. Normal language including comprehension, spontaneous speech, repetition, reading, writing, naming and vocabulary. No neglect with normal visual-spatial function and construction. Normal fund of knowledge and higher integrative function.   CN II: Pupils are equal, round and reactive to light. Normal visual acuity and visual fields.   CN III IV VI: Extraocular movements are full without nystagmus.   CN V: Normal facial sensation and strength of muscles of mastication.   CN VII: Facial movements are symmetric. No weakness.   CN VIII: Auditory acuity is normal.  CN IX & X: Symmetric palatal movement.   CN XI: Sternocleidomastoid and trapezius are normal. No weakness.   CN XII: The tongue is midline. No atrophy or fasciculations.  Motor: Normal muscle strength, bulk and tone in upper and lower extremities. No fasciculations, rigidity, spasticity, or abnormal movements.   Reflexes: 2+ in the upper and lower extremities. Plantar responses are flexor.   Sensation: Normal light touch, pinprick, vibration, temperature, and proprioception in the arms and legs.   Station and Gait: Normal gait and station.   Coordination: Finger to nose test shows no dysmetria. Rapid alternating movements are normal. Heel to shin is normal.            Diagnoses / Discussion:      Plan:       Diagnoses and all orders for this visit:    1. Bilateral occipital neuralgia (Primary)    She has features of migraine as well as occipital neuralgia.  Given that she is 5 months pregnant and options are somewhat limited at this time I recommended trying an occipital nerve block with lidocaine only.  This was discussed in detail today including risks and benefits as well as side effects.  Her blood pressure was only 110/66 today and therefore I would not recommend treatment with a beta-blocker or calcium channel blocker.  I encouraged her to drink plenty of water.  She likely would benefit from  some physical therapy as well, however she says that the physical therapist cannot get her in for an appointment until May.  I will see her back within the next few weeks for an occipital nerve block.       I spent 46 minutes caring for patient on todays date of service. This time includes time spent by me in the following activities: obtaining and/or reviewing a separately obtained history, performing a medically appropriate examination and/or evaluation, counseling and educating the patient on diagnosis and treatment, ordering medications, tests, or procedures, referring and communicating with other health care professionals and documenting information in the medical record.       Coding

## 2025-03-24 NOTE — TELEPHONE ENCOUNTER
Could we please try other physical therapy locations.  Probably someone will have an appointment available.  If not[facility]  Thank you.

## 2025-03-25 ENCOUNTER — PATIENT ROUNDING (BHMG ONLY) (OUTPATIENT)
Dept: NEUROLOGY | Facility: CLINIC | Age: 25
End: 2025-03-25
Payer: COMMERCIAL

## 2025-03-26 ENCOUNTER — TELEPHONE (OUTPATIENT)
Dept: OBSTETRICS AND GYNECOLOGY | Facility: CLINIC | Age: 25
End: 2025-03-26
Payer: COMMERCIAL

## 2025-03-28 ENCOUNTER — ROUTINE PRENATAL (OUTPATIENT)
Dept: OBSTETRICS AND GYNECOLOGY | Facility: CLINIC | Age: 25
End: 2025-03-28
Payer: COMMERCIAL

## 2025-03-28 VITALS — DIASTOLIC BLOOD PRESSURE: 74 MMHG | WEIGHT: 189 LBS | BODY MASS INDEX: 28.75 KG/M2 | SYSTOLIC BLOOD PRESSURE: 116 MMHG

## 2025-03-28 DIAGNOSIS — O26.892 LOW BACK PAIN DURING PREGNANCY, SECOND TRIMESTER: ICD-10-CM

## 2025-03-28 DIAGNOSIS — Z3A.20 20 WEEKS GESTATION OF PREGNANCY: Primary | ICD-10-CM

## 2025-03-28 DIAGNOSIS — M54.50 LOW BACK PAIN DURING PREGNANCY, SECOND TRIMESTER: ICD-10-CM

## 2025-03-28 LAB
GLUCOSE UR STRIP-MCNC: NEGATIVE MG/DL
PROT UR STRIP-MCNC: NEGATIVE MG/DL

## 2025-03-28 PROCEDURE — 0502F SUBSEQUENT PRENATAL CARE: CPT | Performed by: OBSTETRICS & GYNECOLOGY

## 2025-03-28 NOTE — PROGRESS NOTES
CC: Obstetric visit    HPI: 24-year-old  2 para 1 presents at 20 and 1 sevenths weeks for her obstetric visit.  Her baby is moving actively.  She continues to have low back pain and pelvic pressure, which occurred in her last pregnancy as well.    Review of systems    This is positive for low back pain and pelvic pressure.  It is negative for vaginal bleeding.  Negative for contractions.    Physical examination    The abdomen is soft and gravid.  There is no epigastric tenderness.  No fundal tenderness.  No CVA tenderness.  No suprapubic tenderness.  Extremities are equal in size    Assessment and plan    1.  Intrauterine pregnancy at 20 and 1 sevenths weeks  2.  Screening ultrasound was reviewed and discussed with the patient.  This is a normal anatomic survey with the exception of suboptimal visualization of the cardiac outflow tracts and the lower extremities.  Ultrasound will be repeated at the next visit.  3.  1 hour glucose tolerance test next visit.  The patient had gestational diabetes with her last pregnancy.  4.  Pelvic pressure and low back pain in pregnancy.  I recommend physical therapy and the patient agrees.  She needs a new referral.  This has been placed.  5.  Pregnancy related headaches, which have not responded to magnesium oxide.  Neurology workup is greatly appreciated.  Occipital nerve blocks are planned.

## 2025-04-02 ENCOUNTER — OFFICE VISIT (OUTPATIENT)
Dept: FAMILY MEDICINE CLINIC | Facility: CLINIC | Age: 25
End: 2025-04-02
Payer: COMMERCIAL

## 2025-04-02 VITALS
SYSTOLIC BLOOD PRESSURE: 108 MMHG | BODY MASS INDEX: 29.13 KG/M2 | WEIGHT: 192.2 LBS | DIASTOLIC BLOOD PRESSURE: 70 MMHG | OXYGEN SATURATION: 97 % | HEIGHT: 68 IN | TEMPERATURE: 97.8 F | HEART RATE: 96 BPM

## 2025-04-02 DIAGNOSIS — Z3A.20 20 WEEKS GESTATION OF PREGNANCY: ICD-10-CM

## 2025-04-02 DIAGNOSIS — R01.1 HEART MURMUR: ICD-10-CM

## 2025-04-02 DIAGNOSIS — T78.40XD ALLERGIC REACTION, SUBSEQUENT ENCOUNTER: Primary | ICD-10-CM

## 2025-04-02 DIAGNOSIS — E16.2 HYPOGLYCEMIA: ICD-10-CM

## 2025-04-02 NOTE — PROGRESS NOTES
Chief Complaint  Allergic Reaction (4 week follow up)    Subjective          Abdon Andres presents to [facility]              History of Present Illness  The patient is a 24-year-old female here today to follow up on an allergic reaction.    She experienced an episode of lightheadedness en route to the clinic, which she attributes to not having eaten since 9:00 AM. She describes the sensation as akin to her previous experience with gestational diabetes, characterized by low blood sugar levels and impending loss of consciousness. She reports no associated chest pain or shortness of breath. She also reports no vaginal bleeding or abdominal pain. She stopped to get something to eat and reports her symptoms are improving. She checked her glucose after eating and states it was 90.     I last saw her on 2/26/2025 for allergic reaction.  She was referred to allergist and started on Zyrtec. She states swelling subsided after stopping the cephalexin. She has been avoiding Mexican food as a potential trigger. She has not experienced any subsequent swelling. She has not yet consulted with an allergist due to financial constraints. She did not start zyrtec.     She will be 21 weeks pregnant tomorrow, followed by Dr. Moeller. She has a history of gestational diabetes and preeclampsia, which resulted in an early delivery. She is scheduled for a glucose test at her next visit. She does not regularly monitor her blood sugar levels but did so after eating today, recording a reading in the 90s.    Heart murmur auscultated on exam. She occasionally experiences chest pain, which she attributes to gas, and shortness of breath after walking long distances. She avoids caffeine due to its tendency to cause palpitations. She was informed by her mother that she had a heart murmur at birth, but she is uncertain about this information.    She has been referred to a neurologist for severe headaches and is  "scheduled to start occipital nerve injections.    ALLERGIES  The patient is allergic to CEPHALEXIN.    MEDICATIONS  Discontinued: Zyrtec        Objective   Vital Signs:  /70 (BP Location: Left arm, Patient Position: Sitting, Cuff Size: Adult)   Pulse 96   Temp 97.8 °F (36.6 °C) (Infrared)   Ht 172.7 cm (67.99\")   Wt 87.2 kg (192 lb 3.2 oz)   SpO2 97%   BMI 29.23 kg/m²   Estimated body mass index is 29.23 kg/m² as calculated from the following:    Height as of this encounter: 172.7 cm (67.99\").    Weight as of this encounter: 87.2 kg (192 lb 3.2 oz).            Physical Exam  Constitutional:       Appearance: Normal appearance.   HENT:      Head: Normocephalic.   Cardiovascular:      Rate and Rhythm: Normal rate and regular rhythm.      Heart sounds: Murmur heard.   Pulmonary:      Effort: Pulmonary effort is normal.      Breath sounds: Normal breath sounds.   Musculoskeletal:      Cervical back: Neck supple.      Right lower leg: No edema.      Left lower leg: No edema.   Neurological:      Mental Status: She is alert and oriented to person, place, and time.   Psychiatric:         Mood and Affect: Mood normal.        Result Review :  The following data was reviewed by: SINTIA Peguero on 04/02/2025:  Common labs          8/16/2024    20:14 12/31/2024    09:21 2/17/2025    13:47   Common Labs   Glucose 113   140    BUN 12   10    Creatinine 0.80   0.67    Sodium 145   135    Potassium 3.7   3.9    Chloride 110   102    Calcium 9.9   9.9    Albumin 4.5   3.5    Total Bilirubin <0.2   <0.2    Alkaline Phosphatase 111   76    AST (SGOT) 30   16    ALT (SGPT) 33   16    WBC 7.73  8.2  8.59    Hemoglobin 12.4  12.8  11.9    Hematocrit 38.1  40.5  37.4    Platelets 303  332  296    Hemoglobin A1C  5.6            ECG 12 Lead    Date/Time: 4/2/2025 2:32 PM  Performed by: Aria Higgins APRN    Authorized by: Aria Higgins APRN  Comparison: not compared with previous ECG   Previous ECG: no previous " ECG available  Rhythm: sinus rhythm  Rate: normal  BPM: 96  Conduction: conduction normal  ST Segments: ST segments normal  QRS axis: normal    Clinical impression: abnormal EKG  Comments: Borderline t wave abnormality            Assessment and Plan   Diagnoses and all orders for this visit:    1. Allergic reaction, subsequent encounter (Primary)  Assessment & Plan:  She experienced tongue swelling after taking cephalexin, which has been added to her allergy list. She has not yet seen the allergist due to insurance issues. She stopped taking the UTI medication and has not had swelling since. She is advised to avoid cephalosporins and inform other healthcare providers of this allergy.      2. Heart murmur  Assessment & Plan:  A heart murmur was detected, which can be normal during pregnancy. EKG NSR with borderline t wave abnormality.  Will order echocardiogram to differentiate innocent murmur related to pregnancy versus structural issue.  Sending my note to Dr. Moeller, OB/GYN.    She is advised to monitor for symptoms such as shortness of breath, chest pain, heart racing, or skipping a beat.    Orders:  -     ECG 12 Lead  -     Adult Transthoracic Echo Complete W/ Cont if Necessary Per Protocol; Future    3. Hypoglycemia  Assessment & Plan:  She felt lightheaded and nearly lost consciousness, similar to previous episodes of low blood sugar during gestational diabetes. Her blood sugar was 90 after eating. She is advised to maintain regular meals and carry juice or sugar pills for quick blood sugar elevation. If she experiences severe symptoms while driving, she should pull over and call 911. Instructed her to discuss her symptoms with her OBGYN.       4. 20 weeks gestation of pregnancy  Assessment & Plan:  Continue close follow-up with Dr. Moeller.             Patient agrees with plan of care and understands instructions. Call if worsening symptoms or any problems or concerns.     EMR Dragon/Transcription  Disclaimer:  Much of this encounter note is an electronic transcription/translation of spoken language to printed text.  The electronic translation of spoken language may permit erroneous, or at times, nonsensical words or phrases to be inadvertently transcribed; Although I have reviewed the note for such errors, some may still exist.              Follow Up   Return if symptoms worsen or fail to improve.  Patient was given instructions and counseling regarding her condition or for health maintenance advice. Please see specific information pulled into the AVS if appropriate.     Patient or patient representative verbalized consent for the use of Ambient Listening during the visit with  SINTIA Peguero for chart documentation. 4/2/2025  14:09 EDT

## 2025-04-02 NOTE — ASSESSMENT & PLAN NOTE
She experienced tongue swelling after taking cephalexin, which has been added to her allergy list. She has not yet seen the allergist due to insurance issues. She stopped taking the UTI medication and has not had swelling since. She is advised to avoid cephalosporins and inform other healthcare providers of this allergy.

## 2025-04-02 NOTE — ASSESSMENT & PLAN NOTE
A heart murmur was detected, which can be normal during pregnancy. EKG NSR with borderline t wave abnormality.  Will order echocardiogram to differentiate innocent murmur related to pregnancy versus structural issue.  Sending my note to Dr. Moeller, OB/GYN.    She is advised to monitor for symptoms such as shortness of breath, chest pain, heart racing, or skipping a beat.

## 2025-04-02 NOTE — ASSESSMENT & PLAN NOTE
She felt lightheaded and nearly lost consciousness, similar to previous episodes of low blood sugar during gestational diabetes. Her blood sugar was 90 after eating. She is advised to maintain regular meals and carry juice or sugar pills for quick blood sugar elevation. If she experiences severe symptoms while driving, she should pull over and call 911. Instructed her to discuss her symptoms with her OBGYN.

## 2025-04-06 ENCOUNTER — TELEPHONE (OUTPATIENT)
Dept: OBSTETRICS AND GYNECOLOGY | Facility: CLINIC | Age: 25
End: 2025-04-06
Payer: COMMERCIAL

## 2025-04-06 ENCOUNTER — HOSPITAL ENCOUNTER (EMERGENCY)
Facility: HOSPITAL | Age: 25
Discharge: HOME | End: 2025-04-06
Attending: OBSTETRICS & GYNECOLOGY | Admitting: OBSTETRICS & GYNECOLOGY
Payer: COMMERCIAL

## 2025-04-06 VITALS
HEART RATE: 81 BPM | HEIGHT: 68 IN | SYSTOLIC BLOOD PRESSURE: 110 MMHG | OXYGEN SATURATION: 99 % | DIASTOLIC BLOOD PRESSURE: 44 MMHG | RESPIRATION RATE: 16 BRPM | WEIGHT: 197.2 LBS | TEMPERATURE: 98.5 F | BODY MASS INDEX: 29.89 KG/M2

## 2025-04-06 LAB
ALBUMIN SERPL-MCNC: 3.3 G/DL (ref 3.5–5.2)
ALBUMIN/GLOB SERPL: 1 G/DL
ALP SERPL-CCNC: 75 U/L (ref 39–117)
ALT SERPL W P-5'-P-CCNC: 7 U/L (ref 1–33)
ANION GAP SERPL CALCULATED.3IONS-SCNC: 8.8 MMOL/L (ref 5–15)
AST SERPL-CCNC: 9 U/L (ref 1–32)
BASOPHILS # BLD AUTO: 0.04 10*3/MM3 (ref 0–0.2)
BASOPHILS NFR BLD AUTO: 0.4 % (ref 0–1.5)
BILIRUB SERPL-MCNC: <0.2 MG/DL (ref 0–1.2)
BILIRUB UR QL STRIP: NEGATIVE
BUN SERPL-MCNC: 6 MG/DL (ref 6–20)
BUN/CREAT SERPL: 12.2 (ref 7–25)
CALCIUM SPEC-SCNC: 8.6 MG/DL (ref 8.6–10.5)
CHLORIDE SERPL-SCNC: 103 MMOL/L (ref 98–107)
CLARITY UR: CLEAR
CO2 SERPL-SCNC: 23.2 MMOL/L (ref 22–29)
COLOR UR: YELLOW
CREAT SERPL-MCNC: 0.49 MG/DL (ref 0.57–1)
CREAT UR-MCNC: 98 MG/DL
DEPRECATED RDW RBC AUTO: 41.1 FL (ref 37–54)
EGFRCR SERPLBLD CKD-EPI 2021: 136.9 ML/MIN/1.73
EOSINOPHIL # BLD AUTO: 0.16 10*3/MM3 (ref 0–0.4)
EOSINOPHIL NFR BLD AUTO: 1.7 % (ref 0.3–6.2)
ERYTHROCYTE [DISTWIDTH] IN BLOOD BY AUTOMATED COUNT: 15 % (ref 12.3–15.4)
GLOBULIN UR ELPH-MCNC: 3.2 GM/DL
GLUCOSE SERPL-MCNC: 129 MG/DL (ref 65–99)
GLUCOSE UR STRIP-MCNC: NEGATIVE MG/DL
HCT VFR BLD AUTO: 32.7 % (ref 34–46.6)
HGB BLD-MCNC: 10.7 G/DL (ref 12–15.9)
HGB UR QL STRIP.AUTO: NEGATIVE
IMM GRANULOCYTES # BLD AUTO: 0.14 10*3/MM3 (ref 0–0.05)
IMM GRANULOCYTES NFR BLD AUTO: 1.5 % (ref 0–0.5)
KETONES UR QL STRIP: NEGATIVE
LEUKOCYTE ESTERASE UR QL STRIP.AUTO: NEGATIVE
LYMPHOCYTES # BLD AUTO: 1.41 10*3/MM3 (ref 0.7–3.1)
LYMPHOCYTES NFR BLD AUTO: 15 % (ref 19.6–45.3)
MCH RBC QN AUTO: 25.3 PG (ref 26.6–33)
MCHC RBC AUTO-ENTMCNC: 32.7 G/DL (ref 31.5–35.7)
MCV RBC AUTO: 77.3 FL (ref 79–97)
MONOCYTES # BLD AUTO: 0.63 10*3/MM3 (ref 0.1–0.9)
MONOCYTES NFR BLD AUTO: 6.7 % (ref 5–12)
NEUTROPHILS NFR BLD AUTO: 7 10*3/MM3 (ref 1.7–7)
NEUTROPHILS NFR BLD AUTO: 74.7 % (ref 42.7–76)
NITRITE UR QL STRIP: NEGATIVE
NRBC BLD AUTO-RTO: 0 /100 WBC (ref 0–0.2)
PH UR STRIP.AUTO: 6 [PH] (ref 5–8)
PLATELET # BLD AUTO: 246 10*3/MM3 (ref 140–450)
PMV BLD AUTO: 9.4 FL (ref 6–12)
POTASSIUM SERPL-SCNC: 3.5 MMOL/L (ref 3.5–5.2)
PROT ?TM UR-MCNC: 9.2 MG/DL
PROT SERPL-MCNC: 6.5 G/DL (ref 6–8.5)
PROT UR QL STRIP: NEGATIVE
PROT/CREAT UR: 93.9 MG/G CREA (ref 0–200)
RBC # BLD AUTO: 4.23 10*6/MM3 (ref 3.77–5.28)
SODIUM SERPL-SCNC: 135 MMOL/L (ref 136–145)
SP GR UR STRIP: 1.02 (ref 1–1.03)
UROBILINOGEN UR QL STRIP: NORMAL
WBC NRBC COR # BLD AUTO: 9.38 10*3/MM3 (ref 3.4–10.8)

## 2025-04-06 PROCEDURE — 84156 ASSAY OF PROTEIN URINE: CPT | Performed by: OBSTETRICS & GYNECOLOGY

## 2025-04-06 PROCEDURE — 99284 EMERGENCY DEPT VISIT MOD MDM: CPT | Performed by: OBSTETRICS & GYNECOLOGY

## 2025-04-06 PROCEDURE — 81003 URINALYSIS AUTO W/O SCOPE: CPT | Performed by: OBSTETRICS & GYNECOLOGY

## 2025-04-06 PROCEDURE — 80053 COMPREHEN METABOLIC PANEL: CPT | Performed by: OBSTETRICS & GYNECOLOGY

## 2025-04-06 PROCEDURE — 85025 COMPLETE CBC W/AUTO DIFF WBC: CPT | Performed by: OBSTETRICS & GYNECOLOGY

## 2025-04-06 PROCEDURE — 87086 URINE CULTURE/COLONY COUNT: CPT | Performed by: OBSTETRICS & GYNECOLOGY

## 2025-04-06 PROCEDURE — 82570 ASSAY OF URINE CREATININE: CPT | Performed by: OBSTETRICS & GYNECOLOGY

## 2025-04-06 NOTE — TELEPHONE ENCOUNTER
Patient called with 150's systolic BP and headache.  She reports taking tylenol which improved headache slightly but on repeat of her BP one hour apart remained in 150's.  Recommend going to L&D for evaluation. She agreed.

## 2025-04-07 NOTE — OBED NOTES
[facility]  Abdon Andres  : 2000  MRN: [id number]  CSN: [id number]    OB ED Provider Note    Subjective   Chief Complaint   Patient presents with    Headache    Elevated Blood Pressure     Reports headache that she took tylenol for at about 1800. Has not helped. Sees neurology for headaches but has not been prescribed anything. Elevated BPs at home 150s/80s. Reports floaters in vision. +FM FHT at 145 doppler by RN.     Abdon Andres is a 24 y.o. year old  with an Estimated Date of Delivery: 25 currently at 21w3d presenting with persistent frontal HA today that has not responded to tylenol. She is also complaining of dyspnea when laying down that has been present for multiple weeks. An EKG was performed for this last week which was noted to be normal. Visual floaters are present. FM is present.    Prenatal care has been with Dr. Moeller.  It has been complicated by occipital neuralgia with nerve block planned . She also has pelvic pain and pressure for which she is pending a PT referral. She has a history of preeclampsia    OB History    Para Term  AB Living   2 1 1 0 0 1   SAB IAB Ectopic Molar Multiple Live Births   0 0 0 0 0 1      # Outcome Date GA Lbr Cristino/2nd Weight Sex Type Anes PTL Lv   2 Current            1 Term 24 37w6d 07:12 / 01:01 3320 g (7 lb 5.1 oz) F Vag-Spont EPI N ELENA      Birth Comments: panda lr 5      Name: Briana Anum Brown      Apgar1: 8  Apgar5: 8      Obstetric Comments    x1.  7 lbs 5 oz.       Past Medical History:   Diagnosis Date    Neurogenic bladder 12/10/2018    History of back surgery 2019    Fell out of window and broke back    Insulin controlled gestational diabetes mellitus (GDM) in third trimester 2024    Abnormal Pap smear of cervix     Anxiety     Chlamydia     Eczema     Gestational diabetes     insulin dependent    HPV (human papilloma virus) infection     Migraine      Past Surgical History:   Procedure  "Laterality Date    SPINE SURGERY       No current facility-administered medications for this encounter.    Allergies   Allergen Reactions    Cephalexin Swelling    Other Rash     UTI medication (prescribed by hospital)    UTI medication (prescribed by hospital)   UTI medication (prescribed by hospital)     Social History    Tobacco Use      Smoking status: Never        Passive exposure: Never      Smokeless tobacco: Never    Review of Systems   Respiratory:  Positive for shortness of breath.    Gastrointestinal:  Positive for abdominal pain.   Genitourinary:  Positive for vaginal pain.   Neurological:  Positive for headaches.   All other systems reviewed and are negative.        Objective   /68 (BP Location: Right arm, Patient Position: Lying)   Pulse 81   Temp 98.5 °F (36.9 °C) (Oral)   Resp 16   Ht 172.7 cm (68\")   Wt 89.4 kg (197 lb 3.2 oz)   LMP 11/11/2024 (Exact Date)   SpO2 100%   BMI 29.98 kg/m²   General: well developed; well nourished  no acute distress  mentation appropriate   Abdomen: soft, non-tender; no masses  gravid    FHT's: 145      Cervix: was not checked.   Presentation: Unable to appreciate   Contractions: Not monitored   Chest: Unlabored respirations    CV:  RRR   Ext:   No C/C/E   Back: CVA tenderness is deferred bilateral        Prenatal Labs  Lab Results   Component Value Date    HGB 11.9 (L) 02/17/2025    RUBELLAABIGG 4.90 12/31/2024    HEPBSAG Negative 12/31/2024    ABSCRN Negative 12/31/2024    XJV6APK3 Non Reactive 12/31/2024    HEPCVIRUSABY Non Reactive 10/30/2023     (H) 03/06/2024    GGTFASTING 107 (H) 03/11/2024    ART4IFZB 222 (H) 03/11/2024    UVB7ZUJA 171 (H) 03/11/2024    GPB1CNMP 174 (H) 03/11/2024    STREPGPB Negative 05/15/2024    URINECX Final report 02/17/2025    CHLAMNAA Negative 12/31/2024    NGONORRHON Negative 12/31/2024       Current Labs Reviewed   CBC w/ diff:   Lab Results   Component Value Date    WBC 9.38 04/06/2025    NEUTRORELPCT 74.7 " 04/06/2025    AUTOIGPER 1.5 (H) 04/06/2025    LYMPHORELPCT 15.0 (L) 04/06/2025    MONORELPCT 6.7 04/06/2025    EOSRELPCT 1.7 04/06/2025    BASORELPCT 0.4 04/06/2025    HGB 10.7 (L) 04/06/2025    HCT 32.7 (L) 04/06/2025    MCV 77.3 (L) 04/06/2025    RDW 15.0 04/06/2025     04/06/2025     CMP:   Lab Results   Component Value Date     (L) 02/17/2025    K 3.9 02/17/2025     02/17/2025    CO2 22.2 02/17/2025    BUN 10 02/17/2025    CREATININE 0.67 02/17/2025    GLUCOSE 140 (H) 02/17/2025    ALBUMIN 3.5 02/17/2025    CALCIUM 9.9 02/17/2025    AST 16 02/17/2025    ALT 16 02/17/2025    BILITOT <0.2 02/17/2025     UA:    Lab Results   Component Value Date    SQUAMEPIUA 3-6 (A) 02/17/2025    SPECGRAVUR 1.018 04/06/2025    KETONESU Negative 04/06/2025    BLOODU Negative 04/06/2025    LEUKOCYTESUR Negative 04/06/2025    NITRITEU Negative 04/06/2025    RBCUA 0-2 02/17/2025    WBCUA 6-10 (A) 02/17/2025    BACTERIA Trace (A) 02/17/2025       Urine PCR 98.0     Assessment   IUP at 21w3d  Headache- no evidence for preeclampsia based upon BP and urine PCR. Given her history of chronic, refractory HA, this may be something best evaluated by her neurologist.  Dyspnea- chronic. Given her normal O2 sats, unlabored respirations, and normal respiratory rate, this likely represents physiologic dyspnea of pregnancy.     Plan   D/C home with instructions to return for worsening symptoms, acute changes. Keep scheduled prenatal visits.    Kylie Black MD  4/6/2025  20:54 EDT

## 2025-04-08 ENCOUNTER — HOSPITAL ENCOUNTER (EMERGENCY)
Facility: HOSPITAL | Age: 25
Discharge: HOME | End: 2025-04-08
Attending: OBSTETRICS & GYNECOLOGY | Admitting: OBSTETRICS & GYNECOLOGY
Payer: COMMERCIAL

## 2025-04-08 ENCOUNTER — TELEPHONE (OUTPATIENT)
Dept: NEUROLOGY | Facility: CLINIC | Age: 25
End: 2025-04-08
Payer: COMMERCIAL

## 2025-04-08 ENCOUNTER — TELEPHONE (OUTPATIENT)
Dept: OBSTETRICS AND GYNECOLOGY | Facility: CLINIC | Age: 25
End: 2025-04-08
Payer: COMMERCIAL

## 2025-04-08 VITALS
TEMPERATURE: 98.6 F | BODY MASS INDEX: 29.98 KG/M2 | HEIGHT: 68 IN | DIASTOLIC BLOOD PRESSURE: 59 MMHG | SYSTOLIC BLOOD PRESSURE: 120 MMHG | RESPIRATION RATE: 16 BRPM | HEART RATE: 84 BPM

## 2025-04-08 DIAGNOSIS — R51.9 HEADACHE IN PREGNANCY, ANTEPARTUM, SECOND TRIMESTER: Primary | ICD-10-CM

## 2025-04-08 DIAGNOSIS — O26.892 HEADACHE IN PREGNANCY, ANTEPARTUM, SECOND TRIMESTER: Primary | ICD-10-CM

## 2025-04-08 LAB
ALBUMIN SERPL-MCNC: 3.4 G/DL (ref 3.5–5.2)
ALBUMIN/GLOB SERPL: 0.9 G/DL
ALP SERPL-CCNC: 79 U/L (ref 39–117)
ALT SERPL W P-5'-P-CCNC: 9 U/L (ref 1–33)
AMORPH URATE CRY URNS QL MICRO: PRESENT /HPF
ANION GAP SERPL CALCULATED.3IONS-SCNC: 8.3 MMOL/L (ref 5–15)
AST SERPL-CCNC: 14 U/L (ref 1–32)
BACTERIA SPEC AEROBE CULT: NO GROWTH
BACTERIA UR QL AUTO: ABNORMAL /HPF
BASOPHILS # BLD AUTO: 0.02 10*3/MM3 (ref 0–0.2)
BASOPHILS NFR BLD AUTO: 0.2 % (ref 0–1.5)
BILIRUB SERPL-MCNC: <0.2 MG/DL (ref 0–1.2)
BILIRUB UR QL STRIP: NEGATIVE
BUN SERPL-MCNC: 6 MG/DL (ref 6–20)
BUN/CREAT SERPL: 10.9 (ref 7–25)
CALCIUM SPEC-SCNC: 9.4 MG/DL (ref 8.6–10.5)
CHLORIDE SERPL-SCNC: 102 MMOL/L (ref 98–107)
CLARITY UR: ABNORMAL
CO2 SERPL-SCNC: 23.7 MMOL/L (ref 22–29)
COLOR UR: YELLOW
CREAT SERPL-MCNC: 0.55 MG/DL (ref 0.57–1)
CREAT UR-MCNC: 100.3 MG/DL
DEPRECATED RDW RBC AUTO: 39.3 FL (ref 37–54)
EGFRCR SERPLBLD CKD-EPI 2021: 133.1 ML/MIN/1.73
EOSINOPHIL # BLD AUTO: 0.1 10*3/MM3 (ref 0–0.4)
EOSINOPHIL NFR BLD AUTO: 1 % (ref 0.3–6.2)
ERYTHROCYTE [DISTWIDTH] IN BLOOD BY AUTOMATED COUNT: 14.4 % (ref 12.3–15.4)
GLOBULIN UR ELPH-MCNC: 3.6 GM/DL
GLUCOSE SERPL-MCNC: 82 MG/DL (ref 65–99)
GLUCOSE UR STRIP-MCNC: NEGATIVE MG/DL
HCT VFR BLD AUTO: 32.2 % (ref 34–46.6)
HGB BLD-MCNC: 10.8 G/DL (ref 12–15.9)
HGB UR QL STRIP.AUTO: NEGATIVE
HYALINE CASTS UR QL AUTO: ABNORMAL /LPF
IMM GRANULOCYTES # BLD AUTO: 0.14 10*3/MM3 (ref 0–0.05)
IMM GRANULOCYTES NFR BLD AUTO: 1.4 % (ref 0–0.5)
KETONES UR QL STRIP: NEGATIVE
LEUKOCYTE ESTERASE UR QL STRIP.AUTO: ABNORMAL
LYMPHOCYTES # BLD AUTO: 0.9 10*3/MM3 (ref 0.7–3.1)
LYMPHOCYTES NFR BLD AUTO: 9.1 % (ref 19.6–45.3)
MCH RBC QN AUTO: 25.7 PG (ref 26.6–33)
MCHC RBC AUTO-ENTMCNC: 33.5 G/DL (ref 31.5–35.7)
MCV RBC AUTO: 76.7 FL (ref 79–97)
MONOCYTES # BLD AUTO: 0.72 10*3/MM3 (ref 0.1–0.9)
MONOCYTES NFR BLD AUTO: 7.3 % (ref 5–12)
NEUTROPHILS NFR BLD AUTO: 7.97 10*3/MM3 (ref 1.7–7)
NEUTROPHILS NFR BLD AUTO: 81 % (ref 42.7–76)
NITRITE UR QL STRIP: NEGATIVE
NRBC BLD AUTO-RTO: 0 /100 WBC (ref 0–0.2)
PH UR STRIP.AUTO: 8 [PH] (ref 5–8)
PLATELET # BLD AUTO: 282 10*3/MM3 (ref 140–450)
PMV BLD AUTO: 9.9 FL (ref 6–12)
POTASSIUM SERPL-SCNC: 3.8 MMOL/L (ref 3.5–5.2)
PROT ?TM UR-MCNC: 10 MG/DL
PROT SERPL-MCNC: 7 G/DL (ref 6–8.5)
PROT UR QL STRIP: ABNORMAL
PROT/CREAT UR: 99.7 MG/G CREA (ref 0–200)
RBC # BLD AUTO: 4.2 10*6/MM3 (ref 3.77–5.28)
RBC # UR STRIP: ABNORMAL /HPF
REF LAB TEST METHOD: ABNORMAL
SODIUM SERPL-SCNC: 134 MMOL/L (ref 136–145)
SP GR UR STRIP: 1.02 (ref 1–1.03)
SQUAMOUS #/AREA URNS HPF: ABNORMAL /HPF
UROBILINOGEN UR QL STRIP: ABNORMAL
WBC # UR STRIP: ABNORMAL /HPF
WBC NRBC COR # BLD AUTO: 9.85 10*3/MM3 (ref 3.4–10.8)
YEAST URNS QL MICRO: PRESENT /HPF

## 2025-04-08 PROCEDURE — 85025 COMPLETE CBC W/AUTO DIFF WBC: CPT | Performed by: OBSTETRICS & GYNECOLOGY

## 2025-04-08 PROCEDURE — 80053 COMPREHEN METABOLIC PANEL: CPT | Performed by: OBSTETRICS & GYNECOLOGY

## 2025-04-08 PROCEDURE — 99284 EMERGENCY DEPT VISIT MOD MDM: CPT | Performed by: OBSTETRICS & GYNECOLOGY

## 2025-04-08 PROCEDURE — 84156 ASSAY OF PROTEIN URINE: CPT | Performed by: OBSTETRICS & GYNECOLOGY

## 2025-04-08 PROCEDURE — 82570 ASSAY OF URINE CREATININE: CPT | Performed by: OBSTETRICS & GYNECOLOGY

## 2025-04-08 PROCEDURE — 81001 URINALYSIS AUTO W/SCOPE: CPT | Performed by: OBSTETRICS & GYNECOLOGY

## 2025-04-08 RX ORDER — BUTALBITAL, ACETAMINOPHEN AND CAFFEINE 50; 325; 40 MG/1; MG/1; MG/1
1 TABLET ORAL EVERY 4 HOURS PRN
Status: DISCONTINUED | OUTPATIENT
Start: 2025-04-08 | End: 2025-04-08 | Stop reason: HOSPADM

## 2025-04-08 RX ORDER — BUTALBITAL, ACETAMINOPHEN AND CAFFEINE 50; 325; 40 MG/1; MG/1; MG/1
1 TABLET ORAL EVERY 4 HOURS PRN
Qty: 10 TABLET | Refills: 0 | Status: SHIPPED | OUTPATIENT
Start: 2025-04-08

## 2025-04-08 RX ADMIN — BUTALBITAL, ACETAMINOPHEN, AND CAFFEINE 1 TABLET: 50; 325; 40 TABLET ORAL at 15:19

## 2025-04-08 NOTE — TELEPHONE ENCOUNTER
Provider: KAI HIDALGO    Caller: Abdon Andres    Relationship to Patient: Self    Pharmacy: [facility]    Phone Number: [phone]    Reason for Call: ONB AND MIGRAINE GOING ON 4 DAYS.    When was the patient last seen:   3-24-25    When did it start: 4-6-25    Where is it located: FRONT LEFT SIDE OF HER HEAD AND CAUSING HER EYE TO HURT    Characteristics of symptom/severity: 4 OR 5    Timing- Is it constant or intermittent: CONSTANT BUT PAIN LEVEL CHANGES    What makes it worse: BRIGHT LIGHTS    What makes it better: LAYING DOWN IN A DARK ROOM    What therapies/medications have you tried:   TYLENOL AND IT ISN'T HELPING.    PT IS CALLING TO ASK WHEN SHE WILL BE SCHEDULED FOR THE ONB AND WHAT CAN SHE DO TO GET RELIEF SINCE SHE IS PREGNANT?

## 2025-04-08 NOTE — TELEPHONE ENCOUNTER
Patient 22 weeks pregnant. Went to [facility] Sunday for a headache she has had for 3 days. This morning has taken blood pressure 2 times. First 144/73 and second 140/85.

## 2025-04-08 NOTE — NURSING NOTE
Patient given discharge information regarding when to return to the hospital including urgent maternal warning signs, leaking of fluid, vaginal bleeding, contractions, and worsening of symptoms. Patient verbalized understanding.

## 2025-04-08 NOTE — TELEPHONE ENCOUNTER
The patient has been evaluated by neurologist treatments have been recommended.  If the pain is sufficiently severe that she cannot wait for her scheduled neurological treatment, I would recommend that she come to the emergency room where a neurology consult could be performed on an urgent basis.  Thank you.

## 2025-04-08 NOTE — OBED NOTES
[facility]  Abdon Andres  : 2000  MRN: [id number]  CSN: [id number]    OB ED Provider Note    Subjective   Chief Complaint   Patient presents with    Elevated Blood Pressure     Patient to [facility] with complaint of elevated BP at work today. Checked BP at 1252pm and was 160's/90's. Denies visual distrubances. +headache since Saturday.     Abdon Andres is a 24 y.o. year old  with an Estimated Date of Delivery: 25 currently at 21w5d presenting with elevated BP's at work of 140's/80's and 160's/90's. She reports she had an elevated BP in the office a couple weeks ago and has been checking them since. She also reports having a frontal headache for the past 4 days, tylenol doesn't help. She was seen here on  for BP's and headache and evaluation was negative. She reports a history of occipital headaches and was recently seen by neurologist who is planning to treat with occipital nerve block but that has not been scheduled. She denies vision changes or abdominal pain. She is feeling fetal movement.    Prenatal care has been with Dr. Moeller.  It has been complicated by h/o preeclampsia w/ severe features in G1 that required induction at 37wks. She is not taking BASA. Pregnancy is also complicated by h/o GDMA2, short-interval pregnancy (last  was 10 mos ago), anxiety, and chronic back pain. She also reports a recently diagnosed heart murmur.     OB History    Para Term  AB Living   2 1 1 0 0 1   SAB IAB Ectopic Molar Multiple Live Births   0 0 0 0 0 1      # Outcome Date GA Lbr Cristino/2nd Weight Sex Type Anes PTL Lv   2 Current            1 Term 24 37w6d 07:12 / 01:01 3320 g (7 lb 5.1 oz) F Vag-Spont EPI N ELENA      Birth Comments: joona lr 5      Name: Briana Brown      Apgar1: 8  Apgar5: 8      Obstetric Comments    x1.  7 lbs 5 oz.       Past Medical History:   Diagnosis Date    Neurogenic bladder 12/10/2018    History of back surgery 2019    Fell out of  "window and broke back    Insulin controlled gestational diabetes mellitus (GDM) in third trimester 05/09/2024    Abnormal Pap smear of cervix     Anxiety     Chlamydia     Eczema     Gestational diabetes     insulin dependent    HPV (human papilloma virus) infection     Migraine     Pre-eclampsia      Past Surgical History:   Procedure Laterality Date    SPINE SURGERY         Current Facility-Administered Medications:     butalbital-acetaminophen-caffeine (FIORICET, ESGIC) -40 MG per tablet 1 tablet, 1 tablet, Oral, Q4H PRN, Concha Cole MD, 1 tablet at 04/08/25 1519    Allergies   Allergen Reactions    Cephalexin Swelling    Other Rash     UTI medication (prescribed by hospital)    UTI medication (prescribed by hospital)   UTI medication (prescribed by hospital)     Social History    Tobacco Use      Smoking status: Never        Passive exposure: Never      Smokeless tobacco: Never    Review of Systems  +headache      Objective   /91   Pulse 95   Temp 98.6 °F (37 °C) (Oral)   Resp 16   Ht 172.7 cm (68\")   LMP 11/11/2024 (Exact Date)   BMI 29.98 kg/m²   General: well developed; well nourished  no acute distress  mentation appropriate   Abdomen: soft, non-tender; no masses  gravid    FHT's: Reassuring for GA, 150's      Cervix: was not checked.       Contractions: none   Chest: Unlabored respirations    CV:  normal rate, regular rhythm,  no murmurs, rubs, or gallops   Ext:   No C/C/E   Back: CVA tenderness is absent bilateral        Prenatal Labs  Lab Results   Component Value Date    HGB 10.8 (L) 04/08/2025    RUBELLAABIGG 4.90 12/31/2024    HEPBSAG Negative 12/31/2024    ABSCRN Negative 12/31/2024    RFQ4VNK8 Non Reactive 12/31/2024    HEPCVIRUSABY Non Reactive 10/30/2023     (H) 03/06/2024    GGTFASTING 107 (H) 03/11/2024    MUC9QNJA 222 (H) 03/11/2024    WAT4SREM 171 (H) 03/11/2024    KID0YUSX 174 (H) 03/11/2024    STREPGPB Negative 05/15/2024    URINECX No growth 04/06/2025    " CHLAMNAA Negative 12/31/2024    NGONORRHON Negative 12/31/2024       Current Labs Reviewed   CBC:   Lab Results   Component Value Date    WBC 9.85 04/08/2025    HGB 10.8 (L) 04/08/2025    HCT 32.2 (L) 04/08/2025     04/08/2025     CMP:   Lab Results   Component Value Date     (L) 04/08/2025    K 3.8 04/08/2025     04/08/2025    CO2 23.7 04/08/2025    BUN 6 04/08/2025    CREATININE 0.55 (L) 04/08/2025    GLUCOSE 82 04/08/2025    ALBUMIN 3.4 (L) 04/08/2025    CALCIUM 9.4 04/08/2025    AST 14 04/08/2025    ALT 9 04/08/2025    BILITOT <0.2 04/08/2025          Assessment and Plan  IUP at 21w5d with hypertension and headache  BP's at work 140's/80's and 160's/90's  BP's here 120-140's/60-90's  Headache treated with fioricet and improved  Labs: Plts 282, AST/ALT 14/9, P/C 0.09  Fetal status reassuring for GA       Pati Cole MD  4/8/2025  15:44 EDT

## 2025-04-09 ENCOUNTER — ROUTINE PRENATAL (OUTPATIENT)
Dept: OBSTETRICS AND GYNECOLOGY | Facility: CLINIC | Age: 25
End: 2025-04-09
Payer: COMMERCIAL

## 2025-04-09 VITALS — WEIGHT: 190 LBS | DIASTOLIC BLOOD PRESSURE: 74 MMHG | SYSTOLIC BLOOD PRESSURE: 116 MMHG | BODY MASS INDEX: 28.89 KG/M2

## 2025-04-09 DIAGNOSIS — Z3A.21 21 WEEKS GESTATION OF PREGNANCY: Primary | ICD-10-CM

## 2025-04-09 DIAGNOSIS — R51.9 INTRACTABLE HEADACHE, UNSPECIFIED CHRONICITY PATTERN, UNSPECIFIED HEADACHE TYPE: ICD-10-CM

## 2025-04-09 NOTE — PROGRESS NOTES
CC: Obstetric visit    HPI: 24-year-old  2 para 1 presents at 21-6/7 weeks for follow-up of an OB ED visit.  The patient has a chronic headache which has been persistent throughout pregnancy.  She has been evaluated by neurology and it is felt to be musculoskeletal in nature.Tylenol and Flexeril have not been helpful.  Magnesium oxide has not been helpful.  The neurologist plans to do an occipital nerve block, but this has not yet been scheduled.      Yesterday, the patient had elevated blood pressures using her blood pressure cuff machine at home.  She was evaluated in OB ED, where blood pressures were slightly elevated with none in the severe range.  Workup was negative for preeclampsia.    Review of systems    This is positive for headaches.  Negative for vision changes.  Negative for upper abdominal pain.    Physical examination    The abdomen is soft and gravid.  There is no epigastric tenderness.  No fundal tenderness.  No right upper quadrant tenderness.  No CVA tenderness.  No suprapubic tenderness.  Extremities are equal in size    Assessment and plan    1.  Intrauterine pregnancy at 21-6/7 weeks  2.  Chronic headaches.  This has been evaluated by neurology and occipital nerve blocks have been recommended, but not yet scheduled.  The patient has not experienced improvement with Tylenol or Flexeril.  She plans to follow-up with neurology today regarding scheduling her occipital nerve block.  She reports that her headaches are worsened by watching the computer all day at work.  She requests to be off until further treatment.  I feel that this is reasonable.  We will take her off work until her follow-up visit on .  3.  Elevated blood pressures at home.  The patient reports elevated blood pressures on her home machine.  Blood pressures were only mildly elevated in the hospital and this was not consistent with the findings on the home machine.  I recommended that the patient consider calibration  of her machine versus [facility].  Workup for preeclampsia was negative.  We will continue to observe this closely.  4.  Heart murmur observed by the patient's primary care provider.  It is uncertain if this is a routine pregnancy related murmur or a new, pathologic finding.  For this reason, the patient's primary care provider has ordered an echocardiogram and I feel that this is reasonable.  Further recommendations pending the result of this study.

## 2025-04-10 ENCOUNTER — OFFICE VISIT (OUTPATIENT)
Dept: NEUROLOGY | Facility: CLINIC | Age: 25
End: 2025-04-10
Payer: COMMERCIAL

## 2025-04-10 VITALS
HEIGHT: 68 IN | OXYGEN SATURATION: 97 % | SYSTOLIC BLOOD PRESSURE: 110 MMHG | BODY MASS INDEX: 28.89 KG/M2 | HEART RATE: 94 BPM | DIASTOLIC BLOOD PRESSURE: 72 MMHG

## 2025-04-10 DIAGNOSIS — M54.81 BILATERAL OCCIPITAL NEURALGIA: ICD-10-CM

## 2025-04-10 DIAGNOSIS — G43.719 INTRACTABLE CHRONIC MIGRAINE WITHOUT AURA AND WITHOUT STATUS MIGRAINOSUS: Primary | ICD-10-CM

## 2025-04-10 PROCEDURE — 99214 OFFICE O/P EST MOD 30 MIN: CPT | Performed by: NURSE PRACTITIONER

## 2025-04-10 RX ORDER — VERAPAMIL HYDROCHLORIDE 120 MG/1
120 TABLET, FILM COATED, EXTENDED RELEASE ORAL DAILY
Qty: 30 TABLET | Refills: 11 | Status: SHIPPED | OUTPATIENT
Start: 2025-04-10 | End: 2026-04-10

## 2025-04-10 NOTE — PROGRESS NOTES
Subjective   Abdon Andres is a 24 y.o. female presenting for follow-up for headaches.  She is currently 22 weeks pregnant.   she was initially seen on March 24.  Her headaches are mostly occipital and given that she is pregnant I recommended an occipital nerve block.  Unfortunately her insurance denied coverage for this.  She has continued to have daily headaches, some more severe than others.  She was seen in the emergency room earlier this week for them.  She was given a prescription for Fioricet which she says was not helpful.  She is not on any other medications at this time other than a prenatal vitamin.    She has a medical history of anxiety, spine surgery (2019) after an injury (fell out of a window), and headaches.  She has had the pain in the back of her head since her injury in 2019, however states that it feels worse with pregnancy.  She does report bitemporal headaches as well.  She has photophobia.    She has a daughter who will turn 1 on May 29.    She is scheduled to see PT for low back pain and leg pain later this month.     Migraine     Review of Systems   Neurological:  Positive for headache. Negative for dizziness, tremors, seizures, syncope, facial asymmetry, speech difficulty, weakness, light-headedness, numbness, memory problem and confusion.     I have reviewed and confirmed the accuracy of the patient's history: Chief complaint, HPI, ROS, Subjective, and Past Family Social History as entered by the MA/MARIE/RN. Salma Richter MA 04/10/25      Objective     Physical Examination:  General Appearance:  Well developed, well nourished, well groomed, alert, and cooperative.  HEENT: Normocephalic.    Neck and Spine: Normal range of motion.  Normal alignment. No mass or tenderness. No bruits.  Peripheral Vasculature: Radial and pedal pulses are equal and symmetric.   Extremities: No edema or deformities. Normal joint ROM.  Skin: No rashes or birth marks.      Neurological examination:   Higher  Integrative Function: Oriented to time, place, and person. Normal registration, recall attention span and concentration. Normal language including comprehension, spontaneous speech, repetition, reading, writing, naming and vocabulary. No neglect with normal visual-spatial function and construction. Normal fund of knowledge and higher integrative function.   CN II: Pupils are equal, round and reactive to light. Normal visual acuity and visual fields.   CN III IV VI: Extraocular movements are full without nystagmus.   CN V: Normal facial sensation and strength of muscles of mastication.   CN VII: Facial movements are symmetric. No weakness.   CN VIII: Auditory acuity is normal.  CN IX & X: Symmetric palatal movement.   CN XI: Sternocleidomastoid and trapezius are normal. No weakness.   CN XII: The tongue is midline. No atrophy or fasciculations.  Motor: Normal muscle strength, bulk and tone in upper and lower extremities. No fasciculations, rigidity, spasticity, or abnormal movements.   Sensation: Normal light touch, pinprick, vibration, temperature, and proprioception in the arms and legs.   Station and Gait: Normal gait and station.   Coordination: Finger to nose test shows no dysmetria. Rapid alternating movements are normal. Heel to shin is normal.           Assessment & Plan   Diagnoses and all orders for this visit:    1. Intractable chronic migraine without aura and without status migrainosus (Primary)    2. Bilateral occipital neuralgia    Other orders  -     verapamil SR (CALAN-SR) 120 MG CR tablet; Take 1 tablet by mouth Daily.  Dispense: 30 tablet; Refill: 11     Given that she continues to have almost daily headaches, I recommended trying verapamil  mg daily. Side effects reviewed. If she is lightheaded she will call. I encouraged her to drink plenty of water. She is requesting FMLA and will provide us with the paperwork. She will call with any problems or SE on the verapamil.     Follow up 6 weeks.

## 2025-04-15 ENCOUNTER — HOSPITAL ENCOUNTER (OUTPATIENT)
Dept: CARDIOLOGY | Facility: HOSPITAL | Age: 25
Discharge: HOME | End: 2025-04-15
Payer: COMMERCIAL

## 2025-04-15 VITALS
HEART RATE: 70 BPM | WEIGHT: 190 LBS | BODY MASS INDEX: 28.79 KG/M2 | HEIGHT: 68 IN | DIASTOLIC BLOOD PRESSURE: 70 MMHG | SYSTOLIC BLOOD PRESSURE: 116 MMHG

## 2025-04-15 DIAGNOSIS — R01.1 HEART MURMUR: ICD-10-CM

## 2025-04-15 LAB
AORTIC DIMENSIONLESS INDEX: 0.72 (DI)
ASCENDING AORTA: 2.1 CM
AV MEAN PRESS GRAD SYS DOP V1V2: 4.7 MMHG
AV VMAX SYS DOP: 156.1 CM/SEC
BH CV ECHO MEAS - ACS: 1.78 CM
BH CV ECHO MEAS - AO MAX PG: 9.7 MMHG
BH CV ECHO MEAS - AO ROOT AREA (BSA CORRECTED): 1.1 CM2
BH CV ECHO MEAS - AO ROOT DIAM: 2.27 CM
BH CV ECHO MEAS - AO V2 VTI: 28.8 CM
BH CV ECHO MEAS - AVA(I,D): 2.19 CM2
BH CV ECHO MEAS - EDV(CUBED): 103.8 ML
BH CV ECHO MEAS - EDV(MOD-SP2): 67 ML
BH CV ECHO MEAS - EDV(MOD-SP4): 81 ML
BH CV ECHO MEAS - EF(MOD-SP2): 61.2 %
BH CV ECHO MEAS - EF(MOD-SP4): 55.6 %
BH CV ECHO MEAS - ESV(MOD-SP2): 26 ML
BH CV ECHO MEAS - ESV(MOD-SP4): 36 ML
BH CV ECHO MEAS - FS: 34.6 %
BH CV ECHO MEAS - IVS/LVPW: 1.14 CM
BH CV ECHO MEAS - IVSD: 0.8 CM
BH CV ECHO MEAS - LAT PEAK E' VEL: 22.9 CM/SEC
BH CV ECHO MEAS - LV DIASTOLIC VOL/BSA (35-75): 40.5 CM2
BH CV ECHO MEAS - LV MASS(C)D: 112.5 GRAMS
BH CV ECHO MEAS - LV MAX PG: 4.5 MMHG
BH CV ECHO MEAS - LV MEAN PG: 2.27 MMHG
BH CV ECHO MEAS - LV SYSTOLIC VOL/BSA (12-30): 18 CM2
BH CV ECHO MEAS - LV V1 MAX: 106.5 CM/SEC
BH CV ECHO MEAS - LV V1 VTI: 20.6 CM
BH CV ECHO MEAS - LVIDD: 4.7 CM
BH CV ECHO MEAS - LVIDS: 3.1 CM
BH CV ECHO MEAS - LVOT AREA: 3.1 CM2
BH CV ECHO MEAS - LVOT DIAM: 1.98 CM
BH CV ECHO MEAS - LVPWD: 0.7 CM
BH CV ECHO MEAS - MED PEAK E' VEL: 14.3 CM/SEC
BH CV ECHO MEAS - MV A DUR: 0.1 SEC
BH CV ECHO MEAS - MV A MAX VEL: 66.1 CM/SEC
BH CV ECHO MEAS - MV DEC SLOPE: 790.2 CM/SEC2
BH CV ECHO MEAS - MV DEC TIME: 0.11 SEC
BH CV ECHO MEAS - MV E MAX VEL: 104 CM/SEC
BH CV ECHO MEAS - MV E/A: 1.57
BH CV ECHO MEAS - MV MAX PG: 3.8 MMHG
BH CV ECHO MEAS - MV MEAN PG: 1.92 MMHG
BH CV ECHO MEAS - MV P1/2T: 35.9 MSEC
BH CV ECHO MEAS - MV V2 VTI: 18.5 CM
BH CV ECHO MEAS - MVA(P1/2T): 6.1 CM2
BH CV ECHO MEAS - MVA(VTI): 3.4 CM2
BH CV ECHO MEAS - PA V2 MAX: 105.3 CM/SEC
BH CV ECHO MEAS - PULM DIAS VEL: 51.9 CM/SEC
BH CV ECHO MEAS - PULM S/D: 0.68
BH CV ECHO MEAS - PULM SYS VEL: 35.3 CM/SEC
BH CV ECHO MEAS - QP/QS: 0.58
BH CV ECHO MEAS - RAP SYSTOLE: 3 MMHG
BH CV ECHO MEAS - RV MAX PG: 1.44 MMHG
BH CV ECHO MEAS - RV V1 MAX: 60 CM/SEC
BH CV ECHO MEAS - RV V1 VTI: 11.6 CM
BH CV ECHO MEAS - RVOT DIAM: 2 CM
BH CV ECHO MEAS - SV(LVOT): 63.2 ML
BH CV ECHO MEAS - SV(MOD-SP2): 41 ML
BH CV ECHO MEAS - SV(MOD-SP4): 45 ML
BH CV ECHO MEAS - SV(RVOT): 36.5 ML
BH CV ECHO MEAS - SVI(LVOT): 31.6 ML/M2
BH CV ECHO MEAS - SVI(MOD-SP2): 20.5 ML/M2
BH CV ECHO MEAS - SVI(MOD-SP4): 22.5 ML/M2
BH CV ECHO MEAS - TAPSE (>1.6): 2.1 CM
BH CV ECHO MEASUREMENTS AVERAGE E/E' RATIO: 5.59
BH CV XLRA - RV BASE: 2.8 CM
BH CV XLRA - RV LENGTH: 6.8 CM
BH CV XLRA - RV MID: 2.28 CM
BH CV XLRA - TDI S': 11.7 CM/SEC
LEFT ATRIUM VOLUME INDEX: 19 ML/M2
LV EF BIPLANE MOD: 58 %
SINUS: 2.22 CM
STJ: 1.97 CM

## 2025-04-15 PROCEDURE — 93306 TTE W/DOPPLER COMPLETE: CPT

## 2025-04-15 PROCEDURE — 93306 TTE W/DOPPLER COMPLETE: CPT | Performed by: INTERNAL MEDICINE

## 2025-04-21 ENCOUNTER — LAB (OUTPATIENT)
Dept: OBSTETRICS AND GYNECOLOGY | Facility: CLINIC | Age: 25
End: 2025-04-21
Payer: COMMERCIAL

## 2025-04-21 ENCOUNTER — ROUTINE PRENATAL (OUTPATIENT)
Dept: OBSTETRICS AND GYNECOLOGY | Facility: CLINIC | Age: 25
End: 2025-04-21
Payer: COMMERCIAL

## 2025-04-21 VITALS — WEIGHT: 194 LBS | DIASTOLIC BLOOD PRESSURE: 72 MMHG | SYSTOLIC BLOOD PRESSURE: 112 MMHG | BODY MASS INDEX: 29.5 KG/M2

## 2025-04-21 DIAGNOSIS — Z3A.23 23 WEEKS GESTATION OF PREGNANCY: Primary | ICD-10-CM

## 2025-04-21 DIAGNOSIS — Z3A.20 20 WEEKS GESTATION OF PREGNANCY: ICD-10-CM

## 2025-04-21 LAB
GLUCOSE UR STRIP-MCNC: NEGATIVE MG/DL
PROT UR STRIP-MCNC: NEGATIVE MG/DL

## 2025-04-21 PROCEDURE — 99213 OFFICE O/P EST LOW 20 MIN: CPT | Performed by: OBSTETRICS & GYNECOLOGY

## 2025-04-22 LAB
ABO GROUP BLD: NORMAL
BASOPHILS # BLD AUTO: 0 X10E3/UL (ref 0–0.2)
BASOPHILS NFR BLD AUTO: 0 %
EOSINOPHIL # BLD AUTO: 0.1 X10E3/UL (ref 0–0.4)
EOSINOPHIL NFR BLD AUTO: 1 %
ERYTHROCYTE [DISTWIDTH] IN BLOOD BY AUTOMATED COUNT: 14.6 % (ref 11.7–15.4)
GLUCOSE 1H P 50 G GLC PO SERPL-MCNC: 123 MG/DL (ref 70–139)
HCT VFR BLD AUTO: 32.8 % (ref 34–46.6)
HGB BLD-MCNC: 10.6 G/DL (ref 11.1–15.9)
IMM GRANULOCYTES # BLD AUTO: 0.2 X10E3/UL (ref 0–0.1)
IMM GRANULOCYTES NFR BLD AUTO: 2 %
LYMPHOCYTES # BLD AUTO: 1 X10E3/UL (ref 0.7–3.1)
LYMPHOCYTES NFR BLD AUTO: 11 %
MCH RBC QN AUTO: 25.5 PG (ref 26.6–33)
MCHC RBC AUTO-ENTMCNC: 32.3 G/DL (ref 31.5–35.7)
MCV RBC AUTO: 79 FL (ref 79–97)
MONOCYTES # BLD AUTO: 0.5 X10E3/UL (ref 0.1–0.9)
MONOCYTES NFR BLD AUTO: 6 %
NEUTROPHILS # BLD AUTO: 6.9 X10E3/UL (ref 1.4–7)
NEUTROPHILS NFR BLD AUTO: 80 %
PLATELET # BLD AUTO: 268 X10E3/UL (ref 150–450)
RBC # BLD AUTO: 4.15 X10E6/UL (ref 3.77–5.28)
RH BLD: POSITIVE
WBC # BLD AUTO: 8.6 X10E3/UL (ref 3.4–10.8)

## 2025-05-05 ENCOUNTER — TELEPHONE (OUTPATIENT)
Dept: OBSTETRICS AND GYNECOLOGY | Facility: CLINIC | Age: 25
End: 2025-05-05
Payer: COMMERCIAL

## 2025-05-05 NOTE — TELEPHONE ENCOUNTER
Pt called stating she had a lot of gas, bloating, minimal stomach aches, headache, and some nausea. Pt wanted to see if she should be seen or should she got to work. I notified MA and relayed to pt to stay home and drink water. If she starts to feel worse to go to the ER. Pt thinks its a stomach bug.

## 2025-05-05 NOTE — TELEPHONE ENCOUNTER
Patient called the after-hours line with concerns about nausea and having a headache.  She says she took her blood pressure on an automated machine and it was 110/83.  She reports that she has felt poorly for about the last 3-4 days.  She had had some abdominal pains and diarrhea.  She has had nausea but no real emesis.  She denies fevers and chills.  Denies visual disturbances.  Denies chest pain or shortness of breath.    Feel the patient's headache may be from some dehydration related to recent diarrhea.  Suspect viral illness.  Advised patient that she should try to increase oral hydration.  She keep a bland diet.  If she remains afebrile, does not have contractions, has normal fetal movement, and maintains normal blood pressure readings, can likely observe this to see if it improves spontaneously.  Should follow-up with her OB/GYN as scheduled.    If she were to have fevers, contractions, decreased fetal movement, elevated blood pressure, or any other major concerns, would recommend that she come to the hospital for evaluation.

## 2025-05-14 ENCOUNTER — HOSPITAL ENCOUNTER (EMERGENCY)
Facility: HOSPITAL | Age: 25
Discharge: HOME | End: 2025-05-14
Attending: OBSTETRICS & GYNECOLOGY | Admitting: OBSTETRICS & GYNECOLOGY
Payer: COMMERCIAL

## 2025-05-14 ENCOUNTER — TELEPHONE (OUTPATIENT)
Dept: OBSTETRICS AND GYNECOLOGY | Facility: CLINIC | Age: 25
End: 2025-05-14
Payer: COMMERCIAL

## 2025-05-14 VITALS
SYSTOLIC BLOOD PRESSURE: 134 MMHG | RESPIRATION RATE: 12 BRPM | DIASTOLIC BLOOD PRESSURE: 62 MMHG | WEIGHT: 203.4 LBS | HEART RATE: 86 BPM | TEMPERATURE: 98.2 F | OXYGEN SATURATION: 100 % | BODY MASS INDEX: 30.93 KG/M2

## 2025-05-14 LAB
B PARAPERT DNA SPEC QL NAA+PROBE: NOT DETECTED
B PERT DNA SPEC QL NAA+PROBE: NOT DETECTED
BACTERIA UR QL AUTO: ABNORMAL /HPF
BILIRUB UR QL STRIP: NEGATIVE
C PNEUM DNA NPH QL NAA+NON-PROBE: NOT DETECTED
CLARITY UR: CLEAR
COLOR UR: YELLOW
FETAL RBC/RBC BLD FC-RTO: 0 %
FLUAV SUBTYP SPEC NAA+PROBE: NOT DETECTED
FLUBV RNA ISLT QL NAA+PROBE: NOT DETECTED
GLUCOSE UR STRIP-MCNC: NEGATIVE MG/DL
HADV DNA SPEC NAA+PROBE: NOT DETECTED
HCOV 229E RNA SPEC QL NAA+PROBE: NOT DETECTED
HCOV HKU1 RNA SPEC QL NAA+PROBE: NOT DETECTED
HCOV NL63 RNA SPEC QL NAA+PROBE: NOT DETECTED
HCOV OC43 RNA SPEC QL NAA+PROBE: NOT DETECTED
HGB F BLD QL KLEIH BETKE: NORMAL
HGB UR QL STRIP.AUTO: NEGATIVE
HMPV RNA NPH QL NAA+NON-PROBE: NOT DETECTED
HPIV1 RNA ISLT QL NAA+PROBE: NOT DETECTED
HPIV2 RNA SPEC QL NAA+PROBE: NOT DETECTED
HPIV3 RNA NPH QL NAA+PROBE: NOT DETECTED
HPIV4 P GENE NPH QL NAA+PROBE: NOT DETECTED
HYALINE CASTS UR QL AUTO: ABNORMAL /LPF
KETONES UR QL STRIP: NEGATIVE
LEUKOCYTE ESTERASE UR QL STRIP.AUTO: ABNORMAL
M PNEUMO IGG SER IA-ACNC: NOT DETECTED
NITRITE UR QL STRIP: NEGATIVE
PH UR STRIP.AUTO: >=9 [PH] (ref 5–8)
PROT UR QL STRIP: NEGATIVE
RBC # UR STRIP: ABNORMAL /HPF
REF LAB TEST METHOD: ABNORMAL
RHINOVIRUS RNA SPEC NAA+PROBE: DETECTED
RSV RNA NPH QL NAA+NON-PROBE: NOT DETECTED
SARS-COV-2 RNA NPH QL NAA+NON-PROBE: NOT DETECTED
SP GR UR STRIP: 1.01 (ref 1–1.03)
SQUAMOUS #/AREA URNS HPF: ABNORMAL /HPF
UROBILINOGEN UR QL STRIP: ABNORMAL
WBC # UR STRIP: ABNORMAL /HPF

## 2025-05-14 PROCEDURE — 85460 HEMOGLOBIN FETAL: CPT | Performed by: OBSTETRICS & GYNECOLOGY

## 2025-05-14 PROCEDURE — 81001 URINALYSIS AUTO W/SCOPE: CPT | Performed by: OBSTETRICS & GYNECOLOGY

## 2025-05-14 PROCEDURE — 99284 EMERGENCY DEPT VISIT MOD MDM: CPT | Performed by: OBSTETRICS & GYNECOLOGY

## 2025-05-14 PROCEDURE — 0202U NFCT DS 22 TRGT SARS-COV-2: CPT | Performed by: OBSTETRICS & GYNECOLOGY

## 2025-05-14 RX ORDER — BUTALBITAL, ACETAMINOPHEN AND CAFFEINE 50; 325; 40 MG/1; MG/1; MG/1
1 TABLET ORAL ONCE
Status: COMPLETED | OUTPATIENT
Start: 2025-05-14 | End: 2025-05-14

## 2025-05-14 RX ADMIN — BUTALBITAL, ACETAMINOPHEN AND CAFFEINE 1 TABLET: 325; 50; 40 TABLET ORAL at 14:34

## 2025-05-14 NOTE — TELEPHONE ENCOUNTER
Pt state she getting daughter ready, she was accidentally kicked pretty hard in belly. She is now experiencing side belly pain. Pt was advised to go to L&D to be checked on. Thank you

## 2025-05-14 NOTE — OBED NOTES
[facility] Note OB    Patient Name: Abdon Andres  YOB: 2000  MRN: [id number]  Admission Date: 2025 10:15 AM  Date of Service: 2025    Chief Complaint: Abdominal Injury (Pt presents to [facility] with complaints of getting kicked in belly by 4yo child at 0840. Endorses +FM. Denies VB or LOF or ctx. Placed on monitor. VSS.)        Subjective     Abdon Andres is a 24 y.o. female  at 26w6d with Estimated Date of Delivery: 25 who presents with the chief complaint listed above.  Patient reports her child is approximately 20 pounds and points to her left upper fundal area as point of impact from the child's kick.  Patient reports she had initially little pain there but now it has moved down to her left lower quadrant and is ranking it a 3 out of 10.  She denies feeling vaginal bleeding.  She denies feeling contractions at this time.  Patient also reports some upper respiratory congestion and is wondering if she might not have an infection of some sort.  She is drinking orange juice at the bedside.     She sees Nelson Moeller MD for her prenatal care. Her pregnancy has been complicated by: Anemia, headaches, history of gestational diabetes and preeclampsia, GERD    She describes fetal movement as normal.  She denies rupture of membranes.  She denies vaginal bleeding. She is not feeling contractions.        Objective   Patient Active Problem List    Diagnosis     Heart murmur [R01.1]     Hypoglycemia [E16.2]     20 weeks gestation of pregnancy [Z3A.20]     Allergic reaction [T78.40XA]     Acute cystitis without hematuria [N30.00]     Elevated blood pressure reading [R03.0]     Atypical chest pain [R07.89]     Right ear pain [H92.01]     Situational anxiety [F41.8]     Gastroesophageal reflux disease [K21.9]     Nausea and vomiting [R11.2]     Rectal bleeding [K62.5]     Abdominal cramping [R10.9]     Microcytic anemia [D50.9]     History of gestational diabetes [Z86.32]     Normal  vaginal delivery [O80]     Pre-eclampsia in third trimester, without severe features [O14.93]     Injury to L1 level of spinal cord [S34.101A]         OB History    Para Term  AB Living   2 1 1 0 0 1   SAB IAB Ectopic Molar Multiple Live Births   0 0 0 0 0 1      # Outcome Date GA Lbr Cristino/2nd Weight Sex Type Anes PTL Lv   2 Current            1 Term 24 37w6d 07:12 / 01:01 3320 g (7 lb 5.1 oz) F Vag-Spont EPI N ELENA      Birth Comments: joona lr 5      Name: Briana Brown      Apgar1: 8  Apgar5: 8      Obstetric Comments    x1.  7 lbs 5 oz.          Past Medical History:   Diagnosis Date    Abnormal Pap smear of cervix     Anxiety     Chlamydia     Eczema     Gestational diabetes     insulin dependent    History of back surgery 2019    Fell out of window and broke back    HPV (human papilloma virus) infection     Insulin controlled gestational diabetes mellitus (GDM) in third trimester 2024    Migraine     Neurogenic bladder 12/10/2018    Pre-eclampsia        Past Surgical History:   Procedure Laterality Date    SPINE SURGERY         No current facility-administered medications on file prior to encounter.     Current Outpatient Medications on File Prior to Encounter   Medication Sig Dispense Refill    butalbital-acetaminophen-caffeine (FIORICET, ESGIC) -40 MG per tablet Take 1 tablet by mouth Every 4 (Four) Hours As Needed for Headache. (Patient not taking: Reported on 4/10/2025) 10 tablet 0    Prenatal MV & Min w/FA-DHA (Prenatal Gummies) 0.18-25 MG chewable tablet Chew 1 tablet Daily. 30 tablet 11    verapamil SR (CALAN-SR) 120 MG CR tablet Take 1 tablet by mouth Daily. 30 tablet 11       Allergies   Allergen Reactions    Cephalexin Swelling    Other Rash     UTI medication (prescribed by hospital)    UTI medication (prescribed by hospital)   UTI medication (prescribed by hospital)       Family History   Adopted: Yes   Problem Relation Age of Onset    Cancer Maternal  Grandfather        Social History     Socioeconomic History    Marital status: Single   Tobacco Use    Smoking status: Never     Passive exposure: Never    Smokeless tobacco: Never   Vaping Use    Vaping status: Never Used   Substance and Sexual Activity    Alcohol use: Not Currently     Comment: occasional    Drug use: Never    Sexual activity: Yes           Review of Systems   Constitutional:  Negative for chills and fever.   HENT: Negative.     Eyes:  Negative for photophobia and visual disturbance.   Respiratory:  Negative for shortness of breath.    Cardiovascular:  Negative for chest pain.   Gastrointestinal:  Positive for abdominal pain. Negative for nausea.   Psychiatric/Behavioral:  The patient is not nervous/anxious.           PHYSICAL EXAM:      VITAL SIGNS:  Vitals:    05/14/25 0900 05/14/25 1031   BP:  128/64   Pulse:  95   Resp:  12   Temp:  98.1 °F (36.7 °C)   TempSrc:  Oral   SpO2:  100%   Weight: 92.3 kg (203 lb 6.4 oz)             FHT'S:    140 with moderate variability and accelerations                                     PHYSICAL EXAM:      General: well developed; well nourished  no acute distress  mentation appropriate   Heart: Not performed.   Lungs   breathing is unlabored   Abdomen: Gravid and non tender, no bruising, bleeding or lesions    Extremities: trace edema, DTRs 1 plus, no clonus       Cervix: Per RN, no blood        Contractions:   none                    LABS AND TESTING ORDERED:  Uterine and fetal monitoring  Urinalysis  bruna Mercer observation    LAB RESULTS:    Recent Results (from the past 24 hours)   Urinalysis With Microscopic If Indicated (No Culture) - Urine, Clean Catch    Collection Time: 05/14/25 10:34 AM    Specimen: Urine, Clean Catch   Result Value Ref Range    Color, UA Yellow Yellow, Straw    Appearance, UA Clear Clear    pH, UA >=9.0 (H) 5.0 - 8.0    Specific Gravity, UA 1.015 1.005 - 1.030    Glucose, UA Negative Negative    Ketones, UA Negative  Negative    Bilirubin, UA Negative Negative    Blood, UA Negative Negative    Protein, UA Negative Negative    Leuk Esterase, UA Trace (A) Negative    Nitrite, UA Negative Negative    Urobilinogen, UA 1.0 E.U./dL 0.2 - 1.0 E.U./dL   Urinalysis, Microscopic Only - Urine, Clean Catch    Collection Time: 05/14/25 10:34 AM    Specimen: Urine, Clean Catch   Result Value Ref Range    RBC, UA 0-2 None Seen, 0-2 /HPF    WBC, UA 6-10 (A) None Seen, 0-2 /HPF    Bacteria, UA Trace (A) None Seen /HPF    Squamous Epithelial Cells, UA 7-12 (A) None Seen, 0-2 /HPF    Hyaline Casts, UA None Seen None Seen /LPF    Methodology Automated Microscopy    Respiratory Panel PCR w/COVID-19(SARS-CoV-2) SHER/[facility]GR[facility]/REG/PAD/COR/[facility] In-House, NP Swab in UTM/VTM, 2 HR TAT - Swab, Nasopharynx    Collection Time: 05/14/25 10:38 AM    Specimen: Nasopharynx; Swab   Result Value Ref Range    ADENOVIRUS, PCR Not Detected Not Detected    Coronavirus 229E Not Detected Not Detected    Coronavirus HKU1 Not Detected Not Detected    Coronavirus NL63 Not Detected Not Detected    Coronavirus OC43 Not Detected Not Detected    COVID19 Not Detected Not Detected - Ref. Range    Human Metapneumovirus Not Detected Not Detected    Human Rhinovirus/Enterovirus Detected (A) Not Detected    Influenza A PCR Not Detected Not Detected    Influenza B PCR Not Detected Not Detected    Parainfluenza Virus 1 Not Detected Not Detected    Parainfluenza Virus 2 Not Detected Not Detected    Parainfluenza Virus 3 Not Detected Not Detected    Parainfluenza Virus 4 Not Detected Not Detected    RSV, PCR Not Detected Not Detected    Bordetella pertussis pcr Not Detected Not Detected    Bordetella parapertussis PCR Not Detected Not Detected    Chlamydophila pneumoniae PCR Not Detected Not Detected    Mycoplasma pneumo by PCR Not Detected Not Detected       Lab Results   Component Value Date    ABO B 04/21/2025    RH Positive 04/21/2025       Lab Results   Component Value Date    STREPGPB  Negative 05/15/2024                 External Prenatal Results       Pregnancy Outside Results - Transcribed From Office Records - See Scanned Records For Details       Test Value Date Time    ABO  B  04/21/25 1056    Rh  Positive  04/21/25 1056    Antibody Screen  Negative  12/31/24 0921    Varicella IgG       Rubella  4.90 index 12/31/24 0921    Hgb  10.6 g/dL 04/21/25 1056       10.8 g/dL 04/08/25 1355       10.7 g/dL 04/06/25 2113       11.9 g/dL 02/17/25 1347       12.8 g/dL 12/31/24 0921    Hct  32.8 % 04/21/25 1056       32.2 % 04/08/25 1355       32.7 % 04/06/25 2113       37.4 % 02/17/25 1347       40.5 % 12/31/24 0921    HgB A1c   5.6 % 12/31/24 0921    1h GTT  123 mg/dL 04/21/25 1056    3h GTT Fasting       3h GTT 1 hour       3h GTT 2 hour       3h GTT 3 hour        Gonorrhea (discrete)  Negative  12/31/24     Chlamydia (discrete)  Negative  12/31/24     RPR       Syphils cascade: TP-Ab (FTA)  Non Reactive  12/31/24 0921    TP-Ab  Non Reactive  12/31/24 0921    TP-Ab (EIA)       TPPA       HBsAg  Negative  12/31/24 0921    Herpes Simplex Virus PCR       Herpes Simplex VIrus Culture       HIV  Non Reactive  12/31/24 0921    Hep C RNA Quant PCR       Hep C Antibody       AFP  52.0 ng/mL 02/26/25 1448    NIPT       Cystic Fibrosis ([facility]ie)       Cystic Fibroisis        Spinal Muscular atrophy       Fragile X       Group B Strep       GBS Susceptibility to Clindamycin       GBS Susceptibility to Erythromycin       Fetal Fibronectin       Genetic Testing, Maternal Blood                 Drug Screening       Test Value Date Time    Urine Drug Screen       Amphetamine Screen  Negative ng/mL 12/31/24     Barbiturate Screen  Negative ng/mL 12/31/24     Benzodiazepine Screen  Negative ng/mL 12/31/24     Methadone Screen  Negative ng/mL 12/31/24     Phencyclidine Screen  Negative ng/mL 12/31/24     Opiates Screen       THC Screen       Cocaine Screen       Propoxyphene Screen  Negative ng/mL 12/31/24      Buprenorphine Screen       Methamphetamine Screen       Oxycodone Screen       Tricyclic Antidepressants Screen                 Legend    ^: Historical                              Impression:   @ 26w6d .  Final Diagnosis: Status post abdominal kick, no signs of obvious trauma, OB exam benign, KB stain negative, rhinovirus    Plan:  1. Discharge to home.    2. Plan of care has been reviewed with patient along with risks, benefits of treatment.   All questions have been answered. Call health care provider for any further concerns and keep office appointments.  3.  Comfort measures,  labor precautions,      I discussed the patients findings and my recommendations with patient and nursing staff      Gaby Granados MD  2025  12:02 EDT

## 2025-05-19 ENCOUNTER — ROUTINE PRENATAL (OUTPATIENT)
Dept: OBSTETRICS AND GYNECOLOGY | Facility: CLINIC | Age: 25
End: 2025-05-19
Payer: COMMERCIAL

## 2025-05-19 VITALS — DIASTOLIC BLOOD PRESSURE: 72 MMHG | SYSTOLIC BLOOD PRESSURE: 136 MMHG | BODY MASS INDEX: 30.71 KG/M2 | WEIGHT: 202 LBS

## 2025-05-19 DIAGNOSIS — Z3A.28 28 WEEKS GESTATION OF PREGNANCY: Primary | ICD-10-CM

## 2025-05-19 LAB
GLUCOSE UR STRIP-MCNC: NEGATIVE MG/DL
PROT UR STRIP-MCNC: NEGATIVE MG/DL

## 2025-05-19 RX ORDER — FERROUS SULFATE 325(65) MG
325 TABLET ORAL
Qty: 30 TABLET | Refills: 11 | Status: SHIPPED | OUTPATIENT
Start: 2025-05-19

## 2025-05-19 RX ORDER — DOCUSATE SODIUM 100 MG/1
100 CAPSULE, LIQUID FILLED ORAL 2 TIMES DAILY
Qty: 60 CAPSULE | Refills: 1 | Status: SHIPPED | OUTPATIENT
Start: 2025-05-19

## 2025-05-19 NOTE — PROGRESS NOTES
CC: Obstetric visit    HPI: 24-year-old  2 para 1 presents at 27-4/7 weeks for her obstetric visit.  She was kicked in the abdomen by a child, which resulted in pain.  The patient was evaluated on OB ED.  There, her evaluation was negative.  This included monitoring, physical examination and KB stain.  Since then, the patient reports that she is feeling much better.    Review of systems    This is negative for fever or chills.  Nausea or vomiting is absent.  Vaginal bleeding is absent.    Physical examination    The abdomen is soft and gravid.  There is no epigastric tenderness.  No right upper quadrant tenderness.  No fundal tenderness.  No CVA tenderness.  No suprapubic tenderness.  Extremities are equal in size    Assessment and plan    1.  Intrauterine pregnancy at 27-4/7 weeks  2.  Blood type is B+.  RhoGAM will not be needed.  3.  Recent trauma to the abdomen.  Workup in OB ED was negative.  The patient no longer feels any pain.  4.  Chronic headaches.  The patient is taking verapamil intermittently.  She does not report significant improvement.  She has a neurology follow-up coming up.  5.  Anemia of pregnancy.  Counseled.  I recommend iron sulfate and the patient agrees.  She is constipated and is concerned about worsening of this.  She would like a stool softener, which I will prescribe.

## 2025-05-24 ENCOUNTER — HOSPITAL ENCOUNTER (EMERGENCY)
Facility: HOSPITAL | Age: 25
Discharge: HOME | End: 2025-05-25
Attending: OBSTETRICS & GYNECOLOGY | Admitting: OBSTETRICS & GYNECOLOGY
Payer: COMMERCIAL

## 2025-05-24 PROCEDURE — 99284 EMERGENCY DEPT VISIT MOD MDM: CPT | Performed by: OBSTETRICS & GYNECOLOGY

## 2025-05-25 VITALS
HEART RATE: 78 BPM | RESPIRATION RATE: 16 BRPM | SYSTOLIC BLOOD PRESSURE: 124 MMHG | OXYGEN SATURATION: 99 % | TEMPERATURE: 98.3 F | BODY MASS INDEX: 30.71 KG/M2 | DIASTOLIC BLOOD PRESSURE: 61 MMHG | HEIGHT: 68 IN

## 2025-05-25 LAB
BACTERIA UR QL AUTO: ABNORMAL /HPF
BILIRUB UR QL STRIP: NEGATIVE
CLARITY UR: CLEAR
COLOR UR: YELLOW
GLUCOSE UR STRIP-MCNC: NEGATIVE MG/DL
HGB UR QL STRIP.AUTO: NEGATIVE
HYALINE CASTS UR QL AUTO: ABNORMAL /LPF
KETONES UR QL STRIP: NEGATIVE
LEUKOCYTE ESTERASE UR QL STRIP.AUTO: ABNORMAL
NITRITE UR QL STRIP: NEGATIVE
PH UR STRIP.AUTO: 7 [PH] (ref 5–8)
PROT UR QL STRIP: NEGATIVE
RBC # UR STRIP: ABNORMAL /HPF
REF LAB TEST METHOD: ABNORMAL
SP GR UR STRIP: 1.01 (ref 1–1.03)
SQUAMOUS #/AREA URNS HPF: ABNORMAL /HPF
UROBILINOGEN UR QL STRIP: ABNORMAL
WBC # UR STRIP: ABNORMAL /HPF

## 2025-05-25 PROCEDURE — 87086 URINE CULTURE/COLONY COUNT: CPT | Performed by: OBSTETRICS & GYNECOLOGY

## 2025-05-25 PROCEDURE — 81001 URINALYSIS AUTO W/SCOPE: CPT | Performed by: OBSTETRICS & GYNECOLOGY

## 2025-05-25 PROCEDURE — 59025 FETAL NON-STRESS TEST: CPT

## 2025-05-25 NOTE — OBED NOTES
[facility] Note OB        Patient Name: Abdon Andres  YOB: 2000  MRN: [id number]  Admission Date: 2025 11:34 PM  Date of Service: 2025    Chief Complaint: Contractions (Started around . Denies vaginal bleeding/lof. +FM)        Subjective     Abdon Andres is a 24 y.o. female  at 28w3d with Estimated Date of Delivery: 25 who presents with the chief complaint listed above.  She sees Nelson Moeller MD for her prenatal care. Her pregnancy has been complicated by:   anemia .        She describes fetal movement as normal.  She denies rupture of membranes.  She denies vaginal bleeding. She is feeling contractions.          Objective   Patient Active Problem List    Diagnosis     Heart murmur [R01.1]     Hypoglycemia [E16.2]     20 weeks gestation of pregnancy [Z3A.20]     Allergic reaction [T78.40XA]     Acute cystitis without hematuria [N30.00]     Elevated blood pressure reading [R03.0]     Atypical chest pain [R07.89]     Right ear pain [H92.01]     Situational anxiety [F41.8]     Gastroesophageal reflux disease [K21.9]     Nausea and vomiting [R11.2]     Rectal bleeding [K62.5]     Abdominal cramping [R10.9]     Microcytic anemia [D50.9]     History of gestational diabetes [Z86.32]     Normal vaginal delivery [O80]     Pre-eclampsia in third trimester, without severe features [O14.93]     Injury to L1 level of spinal cord [S34.101A]         OB History    Para Term  AB Living   2 1 1 0 0 1   SAB IAB Ectopic Molar Multiple Live Births   0 0 0 0 0 1      # Outcome Date GA Lbr Cristino/2nd Weight Sex Type Anes PTL Lv   2 Current            1 Term 24 37w6d 07:12 / 01:01 3320 g (7 lb 5.1 oz) F Vag-Spont EPI N ELENA      Birth Comments: panda lr 5      Name: Briana Brown      Apgar1: 8  Apgar5: 8      Obstetric Comments    x1.  7 lbs 5 oz.          Past Medical History:   Diagnosis Date    Abnormal Pap smear of cervix     Anxiety     Chlamydia      Eczema     Gestational diabetes     insulin dependent    History of back surgery 2019    Fell out of window and broke back    HPV (human papilloma virus) infection     Insulin controlled gestational diabetes mellitus (GDM) in third trimester 05/09/2024    Migraine     Neurogenic bladder 12/10/2018    Pre-eclampsia        Past Surgical History:   Procedure Laterality Date    SPINE SURGERY         No current facility-administered medications on file prior to encounter.     Current Outpatient Medications on File Prior to Encounter   Medication Sig Dispense Refill    Prenatal MV & Min w/FA-DHA (Prenatal Gummies) 0.18-25 MG chewable tablet Chew 1 tablet Daily. 30 tablet 11    butalbital-acetaminophen-caffeine (FIORICET, ESGIC) -40 MG per tablet Take 1 tablet by mouth Every 4 (Four) Hours As Needed for Headache. (Patient not taking: Reported on 4/10/2025) 10 tablet 0    verapamil SR (CALAN-SR) 120 MG CR tablet Take 1 tablet by mouth Daily. 30 tablet 11       Allergies   Allergen Reactions    Cephalexin Swelling    Other Rash     UTI medication (prescribed by hospital)    UTI medication (prescribed by hospital)   UTI medication (prescribed by hospital)       Family History   Adopted: Yes   Problem Relation Age of Onset    Cancer Maternal Grandfather        Social History     Socioeconomic History    Marital status: Single   Tobacco Use    Smoking status: Never     Passive exposure: Never    Smokeless tobacco: Never   Vaping Use    Vaping status: Never Used   Substance and Sexual Activity    Alcohol use: Not Currently     Comment: occasional    Drug use: Never    Sexual activity: Yes           Review of Systems   Constitutional:  Negative for chills, fatigue and fever.   HENT:  Negative for congestion, rhinorrhea and sore throat.    Eyes:  Negative for visual disturbance.   Respiratory: Negative.     Cardiovascular: Negative.    Gastrointestinal:  Positive for abdominal pain. Negative for constipation, diarrhea,  "nausea and vomiting.   Genitourinary:  Negative for difficulty urinating, dyspareunia, dysuria, flank pain, frequency, genital sores, hematuria, pelvic pain, urgency, vaginal bleeding, vaginal discharge and vaginal pain.   Neurological:  Negative for dizziness, seizures, light-headedness and headaches.   Psychiatric/Behavioral:  Negative for sleep disturbance. The patient is not nervous/anxious.           PHYSICAL EXAM:      VITAL SIGNS:  Vitals:    25 2351   BP: 134/67   BP Location: Right arm   Patient Position: Lying   Pulse: 89   Resp: 16   Temp: 98.3 °F (36.8 °C)   TempSrc: Oral   SpO2: 99%   Height: 172.7 cm (68\")        FHT'S:                   Baseline:  150 BPM  Variability:  Moderate = 6 - 25 BPM  Accelerations:  15 x 15 accelerations present     Decelerations:  absent  Contractions:   absent     Interpretation:    Reactive NST, CAT 1 tracing        PHYSICAL EXAM:    General: well developed; well nourished  no acute distress  mentation appropriate   Heart: Not performed.   Lungs  : breathing is unlabored     Abdomen: soft, non-tender; no masses  no umbilical or inguinal hernias are present  no hepato-splenomegaly       Cervix: was checked (by RN): 0 cm / 1 % / -3  Cervical Dilation (cm): 0  Fetal Station: -3  Cervical Consistency: firm  Cervical Position: mid-position   Contractions: none        Extremities: peripheral pulses normal, no pedal edema, no clubbing or cyanosis      LABS AND TESTING ORDERED:  Uterine and fetal monitoring  Urinalysis      LAB RESULTS:    No results found for this or any previous visit (from the past 24 hours).    Lab Results   Component Value Date    ABO B 2025    RH Positive 2025       Lab Results   Component Value Date    STREPGPB Negative 05/15/2024                 Assessment & Plan     ASSESSMENT/PLAN:  Abdon Andres is a 24 y.o. female  at 28w3d who presented with: concern for  labor.  Patient is not droa and cervix is closed.  " "She was reassured and discharged home.          Final Impression:  Pregnancy at 28w3d  Reactive NST.  CAT 1 tracing  Maternal vital signs were reviewed and were unremarkable              Vitals:    05/24/25 2351   BP: 134/67   BP Location: Right arm   Patient Position: Lying   Pulse: 89   Resp: 16   Temp: 98.3 °F (36.8 °C)   TempSrc: Oral   SpO2: 99%   Height: 172.7 cm (68\")       Lab Results   Component Value Date    STREPGPB Negative 05/15/2024     Lab Results   Component Value Date    ABO B 04/21/2025    RH Positive 04/21/2025     COVID - 19 status unknown      PLAN:       I have spent 45 minutes including face to face time with the patient, greater than 50% in discussion of the diagnosis (counseling) and/or coordination of care.     Joan Plascencia MD  5/25/2025  00:14 EDT  OB Hospitalist  Phone:  [phone]  "

## 2025-05-26 LAB — BACTERIA SPEC AEROBE CULT: NO GROWTH

## 2025-06-05 ENCOUNTER — HOSPITAL ENCOUNTER (EMERGENCY)
Facility: HOSPITAL | Age: 25
Discharge: HOME | End: 2025-06-05
Attending: OBSTETRICS & GYNECOLOGY | Admitting: OBSTETRICS & GYNECOLOGY
Payer: COMMERCIAL

## 2025-06-05 ENCOUNTER — TELEPHONE (OUTPATIENT)
Dept: OBSTETRICS AND GYNECOLOGY | Facility: CLINIC | Age: 25
End: 2025-06-05
Payer: COMMERCIAL

## 2025-06-05 VITALS
TEMPERATURE: 98.3 F | SYSTOLIC BLOOD PRESSURE: 129 MMHG | HEART RATE: 82 BPM | RESPIRATION RATE: 16 BRPM | BODY MASS INDEX: 30.72 KG/M2 | OXYGEN SATURATION: 100 % | DIASTOLIC BLOOD PRESSURE: 63 MMHG | HEIGHT: 68 IN

## 2025-06-05 LAB
BILIRUB UR QL STRIP: NEGATIVE
CLARITY UR: ABNORMAL
COLOR UR: YELLOW
GLUCOSE UR STRIP-MCNC: NEGATIVE MG/DL
HGB UR QL STRIP.AUTO: NEGATIVE
KETONES UR QL STRIP: NEGATIVE
LEUKOCYTE ESTERASE UR QL STRIP.AUTO: NEGATIVE
NITRITE UR QL STRIP: NEGATIVE
PH UR STRIP.AUTO: 7 [PH] (ref 5–8)
PROT UR QL STRIP: NEGATIVE
SP GR UR STRIP: 1.01 (ref 1–1.03)
UROBILINOGEN UR QL STRIP: ABNORMAL

## 2025-06-05 PROCEDURE — 99284 EMERGENCY DEPT VISIT MOD MDM: CPT | Performed by: OBSTETRICS & GYNECOLOGY

## 2025-06-05 PROCEDURE — 87086 URINE CULTURE/COLONY COUNT: CPT | Performed by: OBSTETRICS & GYNECOLOGY

## 2025-06-05 PROCEDURE — 81003 URINALYSIS AUTO W/O SCOPE: CPT | Performed by: OBSTETRICS & GYNECOLOGY

## 2025-06-05 PROCEDURE — 59025 FETAL NON-STRESS TEST: CPT

## 2025-06-05 NOTE — TELEPHONE ENCOUNTER
I recommend a visit to OB ED to have this evaluated to determine if it is something to be concerned about or not.  We can determine how to proceed once we know if everything is okay.  Thank you.

## 2025-06-05 NOTE — TELEPHONE ENCOUNTER
Patient is calling to let you know she is having cramping in her lower abdominal/pelvic area. What helps is laying down and relieving the pressure from sitting. 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     Please advise, thanks!

## 2025-06-05 NOTE — OBED NOTES
[facility] Note OB        Patient Name: Abdon Andres  YOB: 2000  MRN: [id number]  Admission Date: 2025  2:31 PM  Date of Service: 2025    Chief Complaint: Contractions (Pt reports to [facility] for contractions beginning on Saturday. Pt rates pain 4-5/10 and constantly having pain in her lower belly. -LOF, -VB, +FM)        Subjective     Abdon Andres is a 24 y.o. female  at 30w0d with Estimated Date of Delivery: 25 who presents with the chief complaint listed above.  She sees Nelson Moeller MD for her prenatal care. Her pregnancy has been complicated by:  history of preeclampsia, anxiety.    She describes fetal movement as normal.  She denies rupture of membranes.  She denies vaginal bleeding. She is feeling contractions.          Objective   Patient Active Problem List    Diagnosis     Heart murmur [R01.1]     Hypoglycemia [E16.2]     20 weeks gestation of pregnancy [Z3A.20]     Allergic reaction [T78.40XA]     Acute cystitis without hematuria [N30.00]     Elevated blood pressure reading [R03.0]     Atypical chest pain [R07.89]     Right ear pain [H92.01]     Situational anxiety [F41.8]     Gastroesophageal reflux disease [K21.9]     Nausea and vomiting [R11.2]     Rectal bleeding [K62.5]     Abdominal cramping [R10.9]     Microcytic anemia [D50.9]     History of gestational diabetes [Z86.32]     Normal vaginal delivery [O80]     Pre-eclampsia in third trimester, without severe features [O14.93]     Injury to L1 level of spinal cord [S34.101A]         OB History    Para Term  AB Living   2 1 1 0 0 1   SAB IAB Ectopic Molar Multiple Live Births   0 0 0 0 0 1      # Outcome Date GA Lbr Cristino/2nd Weight Sex Type Anes PTL Lv   2 Current            1 Term 24 37w6d 07:12 / 01:01 3320 g (7 lb 5.1 oz) F Vag-Spont EPI N ELENA      Birth Comments: panda lr 5      Name: Briana Brown      Apgar1: 8  Apgar5: 8      Obstetric Comments    x1.  7 lbs 5 oz.           Past Medical History:   Diagnosis Date    Abnormal Pap smear of cervix     Anxiety     Chlamydia     Eczema     Gestational diabetes     insulin dependent    History of back surgery 2019    Fell out of window and broke back    HPV (human papilloma virus) infection     Insulin controlled gestational diabetes mellitus (GDM) in third trimester 05/09/2024    Migraine     Neurogenic bladder 12/10/2018    Pre-eclampsia        Past Surgical History:   Procedure Laterality Date    SPINE SURGERY         No current facility-administered medications on file prior to encounter.     Current Outpatient Medications on File Prior to Encounter   Medication Sig Dispense Refill    Prenatal MV & Min w/FA-DHA (Prenatal Gummies) 0.18-25 MG chewable tablet Chew 1 tablet Daily. 30 tablet 11    butalbital-acetaminophen-caffeine (FIORICET, ESGIC) -40 MG per tablet Take 1 tablet by mouth Every 4 (Four) Hours As Needed for Headache. (Patient not taking: Reported on 4/10/2025) 10 tablet 0    docusate sodium (Colace) 100 MG capsule Take 1 capsule by mouth 2 (Two) Times a Day. 60 capsule 1    ferrous sulfate 325 (65 FE) MG tablet Take 1 tablet by mouth Daily With Breakfast. 30 tablet 11    verapamil SR (CALAN-SR) 120 MG CR tablet Take 1 tablet by mouth Daily. 30 tablet 11       Allergies   Allergen Reactions    Cephalexin Swelling    Other Rash     UTI medication (prescribed by hospital)    UTI medication (prescribed by hospital)   UTI medication (prescribed by hospital)       Family History   Adopted: Yes   Problem Relation Age of Onset    Cancer Maternal Grandfather        Social History     Socioeconomic History    Marital status: Single   Tobacco Use    Smoking status: Never     Passive exposure: Never    Smokeless tobacco: Never   Vaping Use    Vaping status: Never Used   Substance and Sexual Activity    Alcohol use: Not Currently     Comment: occasional    Drug use: Never    Sexual activity: Yes           Review of Systems  "  Constitutional: Negative.    HENT: Negative.     Respiratory: Negative.     Cardiovascular: Negative.    Gastrointestinal:  Positive for abdominal pain.   Genitourinary: Negative.    Neurological: Negative.     =    PHYSICAL EXAM:      VITAL SIGNS:  Vitals:    06/05/25 1458 06/05/25 1459 06/05/25 1504   BP:  129/63    Pulse:  88 82   Resp: 16     Temp: 98.3 °F (36.8 °C)     TempSrc: Oral     SpO2: 99% 99% 100%   Height: 172.7 cm (67.99\")          FHT:                   Baseline:  140 BPM  Variability:  Moderate = 6 - 25 BPM  Accelerations:  15 x 15 accelerations present     Decelerations:  absent  Contractions:   absent     Interpretation:    Reactive NST, CAT 1 tracing        PHYSICAL EXAM:    General: well developed; well nourished  no acute distress  mentation appropriate   Heart: Not performed.   Lungs  : breathing is unlabored     Abdomen: soft, non-tender; no masses  no umbilical or inguinal hernias are present  no hepato-splenomegaly       Cervix: Checked by RN  Cervical Dilation (cm): 0  Cervical Effacement: 0  Fetal Station: -3  Cervical Consistency: firm  Cervical Position: posterior   Contractions: none        Extremities: peripheral pulses normal, no pedal edema, no clubbing or cyanosis      LABS AND TESTING ORDERED:  Uterine and fetal monitoring  Urinalysis      LAB RESULTS:    Recent Results (from the past 24 hours)   Urinalysis With Culture If Indicated - Urine, Clean Catch    Collection Time: 06/05/25  3:03 PM    Specimen: Urine, Clean Catch   Result Value Ref Range    Color, UA Yellow Yellow, Straw    Appearance, UA Turbid (A) Clear    pH, UA 7.0 5.0 - 8.0    Specific Gravity, UA 1.014 1.005 - 1.030    Glucose, UA Negative Negative    Ketones, UA Negative Negative    Bilirubin, UA Negative Negative    Blood, UA Negative Negative    Protein, UA Negative Negative    Leuk Esterase, UA Negative Negative    Nitrite, UA Negative Negative    Urobilinogen, UA 1.0 E.U./dL 0.2 - 1.0 E.U./dL       Lab " Results   Component Value Date    ABO B 2025    RH Positive 2025       Lab Results   Component Value Date    STREPGPB Negative 05/15/2024                 Assessment & Plan     ASSESSMENT/PLAN:  Abdon Andres is a 24 y.o. female  at 30w0d who presented with:  contractions without cervical dilation.        PLAN:     Reactive NST.  Fetal status reassuring.  Patient has been dora for six days.  Her cervix is closed.   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  Advised patient that sitting at rest should not cause contractions and bedrest is no longer recommended in pregnancy.  Recommended increasing her water intake.    She was discharged home with labor precautions and will keep her scheduled appointment in four days.    I have spent 30 minutes including face to face time with the patient, greater than 50% in discussion of the diagnosis (counseling) and/or coordination of care.     Abbi Best MD  2025  15:32 EDT  OB Hospitalist  Phone:  [phone]

## 2025-06-07 LAB — BACTERIA SPEC AEROBE CULT: NO GROWTH

## 2025-06-09 ENCOUNTER — ROUTINE PRENATAL (OUTPATIENT)
Dept: OBSTETRICS AND GYNECOLOGY | Facility: CLINIC | Age: 25
End: 2025-06-09
Payer: COMMERCIAL

## 2025-06-09 VITALS — SYSTOLIC BLOOD PRESSURE: 128 MMHG | DIASTOLIC BLOOD PRESSURE: 74 MMHG | BODY MASS INDEX: 31.15 KG/M2 | WEIGHT: 204.8 LBS

## 2025-06-09 DIAGNOSIS — Z3A.30 30 WEEKS GESTATION OF PREGNANCY: Primary | ICD-10-CM

## 2025-06-09 NOTE — PROGRESS NOTES
CC: Obstetric visit    HPI: 24-year-old  2 para 1 presents at 30-4/7 weeks for her obstetric visit.  Her baby is moving actively.  She had contractions last week, which required a visit to OB ED.  There, her cervix was closed.  Her contractions have resolved over the weekend and she has not experienced any today.  She does feel some pelvic pressure.  Her last baby was born at 37 weeks.  Contractions started at 35 weeks.    Review of systems    This is negative for vaginal bleeding.  Negative for nausea or vomiting.  Negative for fever or chills.    Physical examination    The abdomen is soft and gravid.  There is no epigastric tenderness.  No right upper quadrant tenderness.  No fundal tenderness.  No CVA tenderness.  No suprapubic tenderness.  Pelvic examination reveals normal female external genitalia.  There is no blood in the vaginal vault.  The cervix is posterior and thick.  It is closed.  Extremities are equal in size    Assessment and plan    1.  Intrauterine pregnancy at 30-4/7 weeks  2.  Pelvic pressure with an episode of contractions last week.  Counseled.  The patient feels that this is related to sitting down for long hours at work.  We discussed the possibility of breaks every 2 hours with the opportunity to walk around.  The patient reports that she already does this.  She will continue to watch this closely and contact me if contractions become regular.

## 2025-06-16 ENCOUNTER — TELEPHONE (OUTPATIENT)
Dept: FAMILY MEDICINE CLINIC | Facility: CLINIC | Age: 25
End: 2025-06-16

## 2025-06-16 NOTE — TELEPHONE ENCOUNTER
Caller: Abdon Andres    Relationship to patient: Self    Best call back number:     [phone]       Patient is needing: PATIENT HAS LEFTOVER MEDICATION FROM [facility] AND IS INQUIRING HOW TO DISPOSE OF THEM PROPERLY. PLEASE CALL BACK TO DISCUSS AT EARLIEST CONVENIENCE.

## 2025-06-20 ENCOUNTER — TELEPHONE (OUTPATIENT)
Dept: OBSTETRICS AND GYNECOLOGY | Facility: CLINIC | Age: 25
End: 2025-06-20
Payer: COMMERCIAL

## 2025-06-20 NOTE — TELEPHONE ENCOUNTER
OB Pt is 32w1d. OB Pt called today due to her daughter being diagnosed today with hand, foot and mouth. OB Pt said she's only experiencing sore throat. OB Pt is wondering if the baby may be effected.  Please advise  thanks

## 2025-06-23 ENCOUNTER — ROUTINE PRENATAL (OUTPATIENT)
Dept: OBSTETRICS AND GYNECOLOGY | Facility: CLINIC | Age: 25
End: 2025-06-23
Payer: COMMERCIAL

## 2025-06-23 VITALS — WEIGHT: 209.2 LBS | SYSTOLIC BLOOD PRESSURE: 136 MMHG | BODY MASS INDEX: 31.82 KG/M2 | DIASTOLIC BLOOD PRESSURE: 82 MMHG

## 2025-06-23 DIAGNOSIS — Z3A.32 32 WEEKS GESTATION OF PREGNANCY: Primary | ICD-10-CM

## 2025-06-23 PROCEDURE — 0502F SUBSEQUENT PRENATAL CARE: CPT | Performed by: OBSTETRICS & GYNECOLOGY

## 2025-06-23 NOTE — PROGRESS NOTES
CC: Obstetric visit    HPI: 24-year-old  2 para 1 presents at 32-4/7 weeks for her obstetric visit.  Her baby is moving actively.  She feels fatigue and nausea related to a recent infection with hand-foot and mouth disease.  This occurred on Friday.  She is feeling better, but not well.    Review of systems    This is negative for fever or chills.  Positive for nausea.  Negative for vomiting.  Negative for abdominal or pelvic pain.  Positive for fatigue.    Physical examination    The abdomen is soft and gravid.  There is no epigastric tenderness.  No right upper quadrant tenderness.  No fundal tenderness.  No CVA tenderness.  No suprapubic tenderness.  Extremities are equal in size    Assessment and plan    1.  Intrauterine pregnancy at 32-4/7 weeks  2.  Recent infection with hand-foot and mouth disease.  The patient is recovering, but is not yet well.  I strongly urged her to stay home drink plenty of fluids and control fever with Tylenol.  I advised her not to go to work until she has recovered.

## 2025-07-07 ENCOUNTER — ROUTINE PRENATAL (OUTPATIENT)
Dept: OBSTETRICS AND GYNECOLOGY | Facility: CLINIC | Age: 25
End: 2025-07-07
Payer: COMMERCIAL

## 2025-07-07 VITALS — WEIGHT: 212 LBS | BODY MASS INDEX: 32.24 KG/M2 | DIASTOLIC BLOOD PRESSURE: 77 MMHG | SYSTOLIC BLOOD PRESSURE: 124 MMHG

## 2025-07-07 DIAGNOSIS — Z3A.34 34 WEEKS GESTATION OF PREGNANCY: Primary | ICD-10-CM

## 2025-07-07 LAB
GLUCOSE UR STRIP-MCNC: NEGATIVE MG/DL
HCT VFR BLD AUTO: 32.2 % (ref 34–46.6)
HGB BLD-MCNC: 10.5 G/DL (ref 12–15.9)
PROT UR STRIP-MCNC: NEGATIVE MG/DL

## 2025-07-07 PROCEDURE — 0502F SUBSEQUENT PRENATAL CARE: CPT | Performed by: OBSTETRICS & GYNECOLOGY

## 2025-07-07 NOTE — PROGRESS NOTES
CC: Obstetric visit    HPI: 24-year-old  2 para 1 presents at 34-4/7 weeks for her obstetric visit.  Her baby is moving actively.  She reports that she is completely recovered from hand-foot-and-mouth, as has her child.  She has been treated with iron sulfate for anemia.  She still feels tired and sometimes weak.    Review of systems    This is negative for fever and chills.  Negative for nausea and vomiting.  Negative for abdominal or pelvic pain.    Physical examination    The abdomen is soft and gravid.  There is no epigastric tenderness.  No fundal tenderness.  No CVA tenderness.  No suprapubic tenderness.  Extremities are equal in size    Assessment and plan    1.  Intrauterine pregnancy at 34-4/7 weeks  2.  Anemia.  The patient remains symptomatic despite treatment with oral iron.  I recommend hemoglobin and hematocrit and the patient agrees.  3.  Fundal height is less than dates.  We will check an ultrasound next visit.  4.  Recent episode of hand-foot and mouth disease.  The patient reports that she has completely recovered.

## 2025-07-08 ENCOUNTER — RESULTS FOLLOW-UP (OUTPATIENT)
Dept: OBSTETRICS AND GYNECOLOGY | Facility: CLINIC | Age: 25
End: 2025-07-08
Payer: COMMERCIAL

## 2025-07-08 DIAGNOSIS — D50.9 IRON DEFICIENCY ANEMIA DURING PREGNANCY: ICD-10-CM

## 2025-07-08 DIAGNOSIS — O99.019 IRON DEFICIENCY ANEMIA DURING PREGNANCY: ICD-10-CM

## 2025-07-14 ENCOUNTER — HOSPITAL ENCOUNTER (EMERGENCY)
Facility: HOSPITAL | Age: 25
Discharge: HOME | End: 2025-07-14
Attending: OBSTETRICS & GYNECOLOGY | Admitting: OBSTETRICS & GYNECOLOGY
Payer: COMMERCIAL

## 2025-07-14 VITALS
SYSTOLIC BLOOD PRESSURE: 136 MMHG | HEART RATE: 96 BPM | OXYGEN SATURATION: 100 % | RESPIRATION RATE: 12 BRPM | DIASTOLIC BLOOD PRESSURE: 75 MMHG | TEMPERATURE: 98 F

## 2025-07-14 LAB
BACTERIA UR QL AUTO: ABNORMAL /HPF
BILIRUB UR QL STRIP: NEGATIVE
CLARITY UR: CLEAR
COLOR UR: YELLOW
GLUCOSE UR STRIP-MCNC: NEGATIVE MG/DL
HGB UR QL STRIP.AUTO: NEGATIVE
HYALINE CASTS UR QL AUTO: ABNORMAL /LPF
KETONES UR QL STRIP: NEGATIVE
LEUKOCYTE ESTERASE UR QL STRIP.AUTO: ABNORMAL
NITRITE UR QL STRIP: NEGATIVE
PH UR STRIP.AUTO: 7 [PH] (ref 5–8)
PROT UR QL STRIP: ABNORMAL
RBC # UR STRIP: ABNORMAL /HPF
REF LAB TEST METHOD: ABNORMAL
SP GR UR STRIP: 1.02 (ref 1–1.03)
SQUAMOUS #/AREA URNS HPF: ABNORMAL /HPF
UROBILINOGEN UR QL STRIP: ABNORMAL
WBC # UR STRIP: ABNORMAL /HPF

## 2025-07-14 PROCEDURE — 59025 FETAL NON-STRESS TEST: CPT

## 2025-07-14 PROCEDURE — 99284 EMERGENCY DEPT VISIT MOD MDM: CPT | Performed by: OBSTETRICS & GYNECOLOGY

## 2025-07-14 PROCEDURE — 81001 URINALYSIS AUTO W/SCOPE: CPT | Performed by: OBSTETRICS & GYNECOLOGY

## 2025-07-14 NOTE — OBED NOTES
[facility] Note OB        Patient Name: Abdon Andres  YOB: 2000  MRN: [id number]  Admission Date: 2025  8:51 AM  Date of Service: 2025    Chief Complaint: Contractions (Pt presents to [facility] with ctx that began at 0800. Endorses +Fm. Denies VB or LOF. VSS.)        Subjective     Abdon Andres is a 24 y.o. female  at 35w4d with Estimated Date of Delivery: 25 who presents with the chief complaint listed above.  She sees No att. providers found for her prenatal care. Her pregnancy has been complicated by:  history of pre-eclampsia, anemia.        She describes fetal movement as normal.  She denies rupture of membranes.  She denies vaginal bleeding. She is feeling contractions.          Objective   Patient Active Problem List    Diagnosis     Heart murmur [R01.1]     Hypoglycemia [E16.2]     20 weeks gestation of pregnancy [Z3A.20]     Allergic reaction [T78.40XA]     Acute cystitis without hematuria [N30.00]     Elevated blood pressure reading [R03.0]     Atypical chest pain [R07.89]     Right ear pain [H92.01]     Situational anxiety [F41.8]     Gastroesophageal reflux disease [K21.9]     Nausea and vomiting [R11.2]     Rectal bleeding [K62.5]     Abdominal cramping [R10.9]     Microcytic anemia [D50.9]     History of gestational diabetes [Z86.32]     Normal vaginal delivery [O80]     Pre-eclampsia in third trimester, without severe features [O14.93]     Injury to L1 level of spinal cord [S34.101A]         OB History    Para Term  AB Living   2 1 1 0 0 1   SAB IAB Ectopic Molar Multiple Live Births   0 0 0 0 0 1      # Outcome Date GA Lbr Cristino/2nd Weight Sex Type Anes PTL Lv   2 Current            1 Term 24 37w6d 07:12 / 01:01 3320 g (7 lb 5.1 oz) F Vag-Spont EPI N ELENA      Birth Comments: panda lr 5      Name: Briana Rowan Kevin      Apgar1: 8  Apgar5: 8      Obstetric Comments    x1.  7 lbs 5 oz.          Past Medical History:   Diagnosis Date     Abnormal Pap smear of cervix     Anxiety     Chlamydia     Eczema     Gestational diabetes     insulin dependent    History of back surgery 2019    Fell out of window and broke back    HPV (human papilloma virus) infection     Insulin controlled gestational diabetes mellitus (GDM) in third trimester 05/09/2024    Migraine     Neurogenic bladder 12/10/2018    Pre-eclampsia        Past Surgical History:   Procedure Laterality Date    SPINE SURGERY         No current facility-administered medications on file prior to encounter.     Current Outpatient Medications on File Prior to Encounter   Medication Sig Dispense Refill    butalbital-acetaminophen-caffeine (FIORICET, ESGIC) -40 MG per tablet Take 1 tablet by mouth Every 4 (Four) Hours As Needed for Headache. (Patient not taking: Reported on 4/10/2025) 10 tablet 0    docusate sodium (Colace) 100 MG capsule Take 1 capsule by mouth 2 (Two) Times a Day. 60 capsule 1    ferrous sulfate 325 (65 FE) MG tablet Take 1 tablet by mouth Daily With Breakfast. 30 tablet 11    Prenatal MV & Min w/FA-DHA (Prenatal Gummies) 0.18-25 MG chewable tablet Chew 1 tablet Daily. 30 tablet 11    verapamil SR (CALAN-SR) 120 MG CR tablet Take 1 tablet by mouth Daily. 30 tablet 11       Allergies   Allergen Reactions    Cephalexin Swelling    Other Rash     UTI medication (prescribed by hospital)    UTI medication (prescribed by hospital)   UTI medication (prescribed by hospital)       Family History   Adopted: Yes   Problem Relation Age of Onset    Cancer Maternal Grandfather        Social History     Socioeconomic History    Marital status: Single   Tobacco Use    Smoking status: Never     Passive exposure: Never    Smokeless tobacco: Never   Vaping Use    Vaping status: Never Used   Substance and Sexual Activity    Alcohol use: Not Currently     Comment: occasional    Drug use: Never    Sexual activity: Yes           Review of Systems   Constitutional:  Negative for chills, fatigue and  fever.   HENT:  Negative for congestion, rhinorrhea and sore throat.    Eyes:  Negative for visual disturbance.   Respiratory: Negative.     Cardiovascular: Negative.    Gastrointestinal:  Positive for abdominal pain. Negative for constipation, diarrhea, nausea and vomiting.   Genitourinary:  Negative for difficulty urinating, dyspareunia, dysuria, flank pain, frequency, genital sores, hematuria, pelvic pain, urgency, vaginal bleeding, vaginal discharge and vaginal pain.   Neurological:  Negative for dizziness, seizures, light-headedness and headaches.   Psychiatric/Behavioral:  Negative for sleep disturbance. The patient is not nervous/anxious.           PHYSICAL EXAM:      VITAL SIGNS:  Vitals:    07/14/25 0922   BP: 136/75   Pulse: 96   Resp: 12   Temp: 98 °F (36.7 °C)   TempSrc: Oral   SpO2: 100%        FHT'S:                   Baseline:  150 BPM  Variability:  Moderate = 6 - 25 BPM  Accelerations:  15 x 15 accelerations present     Decelerations:  absent  Contractions:   absent     Interpretation:    Reactive NST, CAT 1 tracing        PHYSICAL EXAM:    General: well developed; well nourished  no acute distress  mentation appropriate   Heart: Not performed.   Lungs  : breathing is unlabored     Abdomen: soft, non-tender; no masses  no umbilical or inguinal hernias are present  no hepato-splenomegaly       Cervix: was checked (by RN): 2 cm / 70 % / -2  Cervical Dilation (cm): 2  Cervical Effacement: 70  Fetal Station: -2  Cervical Consistency: medium  Cervical Position: posterior   Contractions: irregular        Extremities: peripheral pulses normal, no pedal edema, no clubbing or cyanosis      LABS AND TESTING ORDERED:  Uterine and fetal monitoring  Urinalysis      LAB RESULTS:    Recent Results (from the past 24 hours)   Urinalysis With Microscopic If Indicated (No Culture) - Urine, Clean Catch    Collection Time: 07/14/25  9:08 AM    Specimen: Urine, Clean Catch   Result Value Ref Range    Color, UA Yellow  Yellow, Straw    Appearance, UA Clear Clear    pH, UA 7.0 5.0 - 8.0    Specific Gravity, UA 1.024 1.005 - 1.030    Glucose, UA Negative Negative    Ketones, UA Negative Negative    Bilirubin, UA Negative Negative    Blood, UA Negative Negative    Protein, UA 30 mg/dL (1+) (A) Negative    Leuk Esterase, UA Small (1+) (A) Negative    Nitrite, UA Negative Negative    Urobilinogen, UA 1.0 E.U./dL 0.2 - 1.0 E.U./dL   Urinalysis, Microscopic Only - Urine, Clean Catch    Collection Time: 25  9:08 AM    Specimen: Urine, Clean Catch   Result Value Ref Range    RBC, UA 0-2 None Seen, 0-2 /HPF    WBC, UA 6-10 (A) None Seen, 0-2 /HPF    Bacteria, UA 3+ (A) None Seen /HPF    Squamous Epithelial Cells, UA 13-20 (A) None Seen, 0-2 /HPF    Hyaline Casts, UA 3-6 None Seen /LPF    Methodology Automated Microscopy        Lab Results   Component Value Date    ABO B 2025    RH Positive 2025       Lab Results   Component Value Date    STREPGPB Negative 05/15/2024                 Assessment & Plan     ASSESSMENT/PLAN:  Abdon Andres is a 24 y.o. female  at 35w4d who presented with:  contractions.  Patient's contractions irregular and they ended up dissipating.  Cervix did not make change.  She was discharged home with return precautions given.          Final Impression:  Pregnancy at 35w4d  Reactive NST.  CAT 1 tracing  Maternal vital signs were reviewed and were unremarkable              Vitals:    25 0922   BP: 136/75   Pulse: 96   Resp: 12   Temp: 98 °F (36.7 °C)   TempSrc: Oral   SpO2: 100%       Lab Results   Component Value Date    STREPGPB Negative 05/15/2024     Lab Results   Component Value Date    ABO B 2025    RH Positive 2025     COVID - 19 status unknown      PLAN:       I have spent 45 minutes including face to face time with the patient, greater than 50% in discussion of the diagnosis (counseling) and/or coordination of care.     Joan Plascencia,  MD  7/14/2025  13:39 EDT  OB Hospitalist  Phone:  [phone]

## 2025-07-21 ENCOUNTER — ROUTINE PRENATAL (OUTPATIENT)
Dept: OBSTETRICS AND GYNECOLOGY | Facility: CLINIC | Age: 25
End: 2025-07-21
Payer: COMMERCIAL

## 2025-07-21 VITALS — DIASTOLIC BLOOD PRESSURE: 82 MMHG | WEIGHT: 213.4 LBS | BODY MASS INDEX: 32.45 KG/M2 | SYSTOLIC BLOOD PRESSURE: 123 MMHG

## 2025-07-21 DIAGNOSIS — Z3A.36 36 WEEKS GESTATION OF PREGNANCY: Primary | ICD-10-CM

## 2025-07-21 LAB
GLUCOSE UR STRIP-MCNC: NEGATIVE MG/DL
PROT UR STRIP-MCNC: ABNORMAL MG/DL

## 2025-07-21 NOTE — PROGRESS NOTES
CC: Obstetric visit    HPI: 24-year-old  2 para 1 presents at 36-4/7 weeks for her obstetric visit.  Her baby is moving actively.  She complains of painful contractions.  She is uncertain if they are regular.  She went to the hospital last week for contractions.    Review of systems    This is positive for contractions.  Negative for vaginal bleeding.  Negative for nausea or vomiting.    Physical examination    The abdomen is soft and gravid.  There is no epigastric tenderness.  No right upper quadrant tenderness.  No fundal tenderness.  No CVA tenderness.  No suprapubic tenderness.  Pelvic examination reveals normal female external genitalia.  There is no blood in the vaginal vault.  The cervix is posterior.  It is 50% effaced and 1.5 cm dilated  Extremities are equal in size    Assessment and plan    1.  Intrauterine pregnancy at 36-4/7 weeks  2.  Ultrasound was reviewed and discussed with the patient this estimated fetal weight of 6 pounds 8 ounces which is 49th percentile.  Normal amniotic fluid index.  3.  Group B strep cultures performed.  Counseled.  4.  Contractions without cervical change between the visit on  and today.  The patient complains that her contractions are painful and she is concerned about them.  I recommended that she go to OB ED for observation.  This will allow us to rule out labor over time.

## 2025-07-25 LAB — B-HEM STREP SPEC QL CULT: NEGATIVE

## 2025-07-28 ENCOUNTER — ROUTINE PRENATAL (OUTPATIENT)
Dept: OBSTETRICS AND GYNECOLOGY | Facility: CLINIC | Age: 25
End: 2025-07-28
Payer: COMMERCIAL

## 2025-07-28 VITALS — SYSTOLIC BLOOD PRESSURE: 136 MMHG | WEIGHT: 216.6 LBS | DIASTOLIC BLOOD PRESSURE: 82 MMHG | BODY MASS INDEX: 32.94 KG/M2

## 2025-07-28 DIAGNOSIS — Z3A.37 37 WEEKS GESTATION OF PREGNANCY: Primary | ICD-10-CM

## 2025-07-28 LAB
GLUCOSE UR STRIP-MCNC: NEGATIVE MG/DL
PROT UR STRIP-MCNC: NEGATIVE MG/DL

## 2025-07-28 PROCEDURE — 0502F SUBSEQUENT PRENATAL CARE: CPT | Performed by: OBSTETRICS & GYNECOLOGY

## 2025-07-28 NOTE — PROGRESS NOTES
CC: Obstetric visit    HPI: 24-year-old  2 para 1 presents at 37-4/7 weeks for her obstetric visit.  Her baby is moving.  She has contractions, but they have not increased in intensity.    Review of systems    This is negative for fever or chills.  Negative for nausea or vomiting.  Negative for vaginal bleeding.  Positive for intermittent contractions, which are not regular.    Physical examination    The abdomen is soft and gravid.  There is no epigastric tenderness.  No fundal tenderness.  No CVA tenderness.  No suprapubic tenderness.  Pelvic examination reveals normal female external genitalia.  There is no blood in the vaginal vault.  The cervix is 70% effaced and 1.5 cm dilated  Extremities are equal in size    Assessment and plan    1.  Intrauterine pregnancy at 37-4/7 weeks  2.  Group B strep cultures are negative.  Counseled.  3.  Labor warnings and rupture of membrane warnings given.  4.  Episode of dizziness and lightheadedness on Friday.  This has resolved spontaneously without recurrence.  Counseled.  5.  The patient has a skin lesion surrounding her mouth.  She has been evaluated by a dermatologist and is satisfied with her current treatment.

## 2025-07-31 ENCOUNTER — TELEPHONE (OUTPATIENT)
Dept: OBSTETRICS AND GYNECOLOGY | Facility: CLINIC | Age: 25
End: 2025-07-31
Payer: COMMERCIAL

## 2025-07-31 NOTE — TELEPHONE ENCOUNTER
Dr. Moeller OB pt  38 wks. Unsure if she may have lost mucus plug, but did notice a bit of blood as she wiped. States she is experiencing a constant sharp vaginal pain and her stomach seems to be tight.     Pt wanting to know if she should go to L&D due to symptoms or monitor for now     Please advise, thank you

## 2025-07-31 NOTE — TELEPHONE ENCOUNTER
Taty, I attempt to call the pt to discuss, but was unable to reach her. If she is having painful contractions or heavy bleeding she should go to [facility]. -Delia

## 2025-07-31 NOTE — TELEPHONE ENCOUNTER
I returned Abdon Andres's phone call. No answer. I was not able to leave a .    Delia Guzman, APRN  7/31/25  4:21pm

## 2025-08-04 ENCOUNTER — ROUTINE PRENATAL (OUTPATIENT)
Dept: OBSTETRICS AND GYNECOLOGY | Facility: CLINIC | Age: 25
End: 2025-08-04
Payer: COMMERCIAL

## 2025-08-04 VITALS — DIASTOLIC BLOOD PRESSURE: 74 MMHG | WEIGHT: 218 LBS | SYSTOLIC BLOOD PRESSURE: 120 MMHG | BODY MASS INDEX: 33.15 KG/M2

## 2025-08-04 DIAGNOSIS — M54.50 LOW BACK PAIN DURING PREGNANCY, THIRD TRIMESTER: ICD-10-CM

## 2025-08-04 DIAGNOSIS — Z3A.38 38 WEEKS GESTATION OF PREGNANCY: Primary | ICD-10-CM

## 2025-08-04 DIAGNOSIS — O26.893 LOW BACK PAIN DURING PREGNANCY, THIRD TRIMESTER: ICD-10-CM

## 2025-08-04 LAB
GLUCOSE UR STRIP-MCNC: NEGATIVE MG/DL
PROT UR STRIP-MCNC: NEGATIVE MG/DL

## 2025-08-10 ENCOUNTER — ANESTHESIA EVENT (OUTPATIENT)
Dept: LABOR AND DELIVERY | Facility: HOSPITAL | Age: 25
End: 2025-08-10
Payer: COMMERCIAL

## 2025-08-10 ENCOUNTER — ANESTHESIA (OUTPATIENT)
Dept: LABOR AND DELIVERY | Facility: HOSPITAL | Age: 25
End: 2025-08-10
Payer: COMMERCIAL

## 2025-08-10 ENCOUNTER — HOSPITAL ENCOUNTER (INPATIENT)
Facility: HOSPITAL | Age: 25
LOS: 2 days | Discharge: HOME | End: 2025-08-12
Attending: OBSTETRICS & GYNECOLOGY | Admitting: OBSTETRICS & GYNECOLOGY
Payer: COMMERCIAL

## 2025-08-10 PROBLEM — Z34.90 PREGNANT: Status: ACTIVE | Noted: 2025-08-10

## 2025-08-10 PROBLEM — Z34.90 PREGNANT: Status: RESOLVED | Noted: 2025-08-10 | Resolved: 2025-08-10

## 2025-08-10 LAB
ABO GROUP BLD: NORMAL
ALBUMIN SERPL-MCNC: 3.4 G/DL (ref 3.5–5.2)
ALBUMIN/GLOB SERPL: 1 G/DL
ALP SERPL-CCNC: 191 U/L (ref 39–117)
ALT SERPL W P-5'-P-CCNC: 11 U/L (ref 1–33)
ANION GAP SERPL CALCULATED.3IONS-SCNC: 14 MMOL/L (ref 5–15)
AST SERPL-CCNC: 16 U/L (ref 1–32)
ATMOSPHERIC PRESS: 750.9 MMHG
ATMOSPHERIC PRESS: 752.1 MMHG
BASE EXCESS BLDCOA CALC-SCNC: -3.5 MMOL/L (ref -2–2)
BASE EXCESS BLDCOV CALC-SCNC: -3.4 MMOL/L (ref -30–30)
BASOPHILS # BLD AUTO: 0.02 10*3/MM3 (ref 0–0.2)
BASOPHILS NFR BLD AUTO: 0.3 % (ref 0–1.5)
BDY SITE: ABNORMAL
BDY SITE: NORMAL
BILIRUB SERPL-MCNC: 0.2 MG/DL (ref 0–1.2)
BLD GP AB SCN SERPL QL: NEGATIVE
BUN SERPL-MCNC: 3 MG/DL (ref 6–20)
BUN/CREAT SERPL: 6 (ref 7–25)
CALCIUM SPEC-SCNC: 9 MG/DL (ref 8.6–10.5)
CHLORIDE SERPL-SCNC: 102 MMOL/L (ref 98–107)
CO2 SERPL-SCNC: 18 MMOL/L (ref 22–29)
CREAT SERPL-MCNC: 0.5 MG/DL (ref 0.57–1)
DEPRECATED RDW RBC AUTO: 66.8 FL (ref 37–54)
EGFRCR SERPLBLD CKD-EPI 2021: 136.2 ML/MIN/1.73
EOSINOPHIL # BLD AUTO: 0.18 10*3/MM3 (ref 0–0.4)
EOSINOPHIL NFR BLD AUTO: 2.3 % (ref 0.3–6.2)
ERYTHROCYTE [DISTWIDTH] IN BLOOD BY AUTOMATED COUNT: 23.1 % (ref 12.3–15.4)
GLOBULIN UR ELPH-MCNC: 3.3 GM/DL
GLUCOSE SERPL-MCNC: 155 MG/DL (ref 65–99)
HCO3 BLDCOA-SCNC: 24.9 MMOL/L (ref 22–28)
HCO3 BLDCOV-SCNC: 21.7 MMOL/L
HCT VFR BLD AUTO: 37.6 % (ref 34–46.6)
HGB BLD-MCNC: 12.5 G/DL (ref 12–15.9)
IMM GRANULOCYTES # BLD AUTO: 0.1 10*3/MM3 (ref 0–0.05)
IMM GRANULOCYTES NFR BLD AUTO: 1.3 % (ref 0–0.5)
LYMPHOCYTES # BLD AUTO: 0.92 10*3/MM3 (ref 0.7–3.1)
LYMPHOCYTES NFR BLD AUTO: 11.6 % (ref 19.6–45.3)
MCH RBC QN AUTO: 26.8 PG (ref 26.6–33)
MCHC RBC AUTO-ENTMCNC: 33.2 G/DL (ref 31.5–35.7)
MCV RBC AUTO: 80.7 FL (ref 79–97)
MODALITY: ABNORMAL
MODALITY: NORMAL
MONOCYTES # BLD AUTO: 0.44 10*3/MM3 (ref 0.1–0.9)
MONOCYTES NFR BLD AUTO: 5.5 % (ref 5–12)
NEUTROPHILS NFR BLD AUTO: 6.3 10*3/MM3 (ref 1.7–7)
NEUTROPHILS NFR BLD AUTO: 79 % (ref 42.7–76)
NRBC BLD AUTO-RTO: 0 /100 WBC (ref 0–0.2)
PCO2 BLDCOA: 56.5 MMHG (ref 43–63)
PCO2 BLDCOV: 38.7 MM HG (ref 35–51.3)
PH BLDCOA: 7.25 PH UNITS (ref 7.18–7.34)
PH BLDCOV: 7.36 PH UNITS (ref 7.26–7.4)
PLATELET # BLD AUTO: 233 10*3/MM3 (ref 140–450)
PMV BLD AUTO: 9.7 FL (ref 6–12)
PO2 BLDCOA: <15 MMHG (ref 12–26)
PO2 BLDCOV: 29.4 MM HG (ref 19–39)
POTASSIUM SERPL-SCNC: 3.6 MMOL/L (ref 3.5–5.2)
PROT SERPL-MCNC: 6.7 G/DL (ref 6–8.5)
RBC # BLD AUTO: 4.66 10*6/MM3 (ref 3.77–5.28)
RH BLD: POSITIVE
SAO2 % BLDCOV: NORMAL %
SODIUM SERPL-SCNC: 134 MMOL/L (ref 136–145)
T&S EXPIRATION DATE: NORMAL
TOTAL RATE: 20 BREATHS/MINUTE
TOTAL RATE: 20 BREATHS/MINUTE
TREPONEMA PALLIDUM IGG+IGM AB [PRESENCE] IN SERUM OR PLASMA BY IMMUNOASSAY: NORMAL
WBC NRBC COR # BLD AUTO: 7.96 10*3/MM3 (ref 3.4–10.8)

## 2025-08-10 PROCEDURE — 25010000002 LIDOCAINE HCL (PF) 1.5 % SOLUTION: Performed by: ANESTHESIOLOGY

## 2025-08-10 PROCEDURE — 86900 BLOOD TYPING SEROLOGIC ABO: CPT | Performed by: OBSTETRICS & GYNECOLOGY

## 2025-08-10 PROCEDURE — 59400 OBSTETRICAL CARE: CPT | Performed by: OBSTETRICS & GYNECOLOGY

## 2025-08-10 PROCEDURE — C1755 CATHETER, INTRASPINAL: HCPCS | Performed by: STUDENT IN AN ORGANIZED HEALTH CARE EDUCATION/TRAINING PROGRAM

## 2025-08-10 PROCEDURE — 25010000002 ONDANSETRON PER 1 MG: Performed by: OBSTETRICS & GYNECOLOGY

## 2025-08-10 PROCEDURE — 85025 COMPLETE CBC W/AUTO DIFF WBC: CPT | Performed by: OBSTETRICS & GYNECOLOGY

## 2025-08-10 PROCEDURE — 86850 RBC ANTIBODY SCREEN: CPT | Performed by: OBSTETRICS & GYNECOLOGY

## 2025-08-10 PROCEDURE — 86901 BLOOD TYPING SEROLOGIC RH(D): CPT | Performed by: OBSTETRICS & GYNECOLOGY

## 2025-08-10 PROCEDURE — 25010000002 ROPIVACAINE PER 1 MG: Performed by: STUDENT IN AN ORGANIZED HEALTH CARE EDUCATION/TRAINING PROGRAM

## 2025-08-10 PROCEDURE — 25810000003 LACTATED RINGERS PER 1000 ML: Performed by: OBSTETRICS & GYNECOLOGY

## 2025-08-10 PROCEDURE — 25010000002 BUTORPHANOL PER 1 MG: Performed by: OBSTETRICS & GYNECOLOGY

## 2025-08-10 PROCEDURE — 82803 BLOOD GASES ANY COMBINATION: CPT | Performed by: OBSTETRICS & GYNECOLOGY

## 2025-08-10 PROCEDURE — 86780 TREPONEMA PALLIDUM: CPT | Performed by: OBSTETRICS & GYNECOLOGY

## 2025-08-10 PROCEDURE — 80053 COMPREHEN METABOLIC PANEL: CPT | Performed by: OBSTETRICS & GYNECOLOGY

## 2025-08-10 RX ORDER — SODIUM CHLORIDE 9 MG/ML
40 INJECTION, SOLUTION INTRAVENOUS AS NEEDED
Status: DISCONTINUED | OUTPATIENT
Start: 2025-08-10 | End: 2025-08-10 | Stop reason: HOSPADM

## 2025-08-10 RX ORDER — HYDROCORTISONE 25 MG/G
1 CREAM TOPICAL AS NEEDED
Status: DISCONTINUED | OUTPATIENT
Start: 2025-08-10 | End: 2025-08-12 | Stop reason: HOSPADM

## 2025-08-10 RX ORDER — DIPHENHYDRAMINE HYDROCHLORIDE 50 MG/ML
12.5 INJECTION, SOLUTION INTRAMUSCULAR; INTRAVENOUS EVERY 8 HOURS PRN
Status: DISCONTINUED | OUTPATIENT
Start: 2025-08-10 | End: 2025-08-10 | Stop reason: HOSPADM

## 2025-08-10 RX ORDER — OXYTOCIN/0.9 % SODIUM CHLORIDE 30/500 ML
999 PLASTIC BAG, INJECTION (ML) INTRAVENOUS ONCE
Status: DISCONTINUED | OUTPATIENT
Start: 2025-08-10 | End: 2025-08-10 | Stop reason: HOSPADM

## 2025-08-10 RX ORDER — BUTORPHANOL TARTRATE 2 MG/ML
2 INJECTION, SOLUTION INTRAMUSCULAR; INTRAVENOUS
Status: DISCONTINUED | OUTPATIENT
Start: 2025-08-10 | End: 2025-08-10 | Stop reason: HOSPADM

## 2025-08-10 RX ORDER — FAMOTIDINE 10 MG/ML
20 INJECTION, SOLUTION INTRAVENOUS 2 TIMES DAILY PRN
Status: DISCONTINUED | OUTPATIENT
Start: 2025-08-10 | End: 2025-08-10 | Stop reason: HOSPADM

## 2025-08-10 RX ORDER — ONDANSETRON 4 MG/1
4 TABLET, ORALLY DISINTEGRATING ORAL EVERY 6 HOURS PRN
Status: DISCONTINUED | OUTPATIENT
Start: 2025-08-10 | End: 2025-08-10 | Stop reason: HOSPADM

## 2025-08-10 RX ORDER — ONDANSETRON 2 MG/ML
4 INJECTION INTRAMUSCULAR; INTRAVENOUS ONCE AS NEEDED
Status: DISCONTINUED | OUTPATIENT
Start: 2025-08-10 | End: 2025-08-10 | Stop reason: HOSPADM

## 2025-08-10 RX ORDER — CALCIUM CARBONATE 500 MG/1
2 TABLET, CHEWABLE ORAL 3 TIMES DAILY PRN
Status: DISCONTINUED | OUTPATIENT
Start: 2025-08-10 | End: 2025-08-12 | Stop reason: HOSPADM

## 2025-08-10 RX ORDER — FAMOTIDINE 10 MG/ML
20 INJECTION, SOLUTION INTRAVENOUS ONCE AS NEEDED
Status: DISCONTINUED | OUTPATIENT
Start: 2025-08-10 | End: 2025-08-10 | Stop reason: HOSPADM

## 2025-08-10 RX ORDER — EPHEDRINE SULFATE 50 MG/ML
5 INJECTION, SOLUTION INTRAVENOUS
Status: DISCONTINUED | OUTPATIENT
Start: 2025-08-10 | End: 2025-08-10 | Stop reason: HOSPADM

## 2025-08-10 RX ORDER — DOCUSATE SODIUM 100 MG/1
100 CAPSULE, LIQUID FILLED ORAL 2 TIMES DAILY
Status: DISCONTINUED | OUTPATIENT
Start: 2025-08-11 | End: 2025-08-12 | Stop reason: HOSPADM

## 2025-08-10 RX ORDER — ONDANSETRON 4 MG/1
4 TABLET, ORALLY DISINTEGRATING ORAL EVERY 8 HOURS PRN
Status: DISCONTINUED | OUTPATIENT
Start: 2025-08-10 | End: 2025-08-12 | Stop reason: HOSPADM

## 2025-08-10 RX ORDER — TERBUTALINE SULFATE 1 MG/ML
0.25 INJECTION SUBCUTANEOUS AS NEEDED
Status: DISCONTINUED | OUTPATIENT
Start: 2025-08-10 | End: 2025-08-10 | Stop reason: HOSPADM

## 2025-08-10 RX ORDER — ONDANSETRON 2 MG/ML
4 INJECTION INTRAMUSCULAR; INTRAVENOUS EVERY 6 HOURS PRN
Status: DISCONTINUED | OUTPATIENT
Start: 2025-08-10 | End: 2025-08-10 | Stop reason: HOSPADM

## 2025-08-10 RX ORDER — LIDOCAINE HYDROCHLORIDE 15 MG/ML
INJECTION, SOLUTION EPIDURAL; INFILTRATION; INTRACAUDAL; PERINEURAL AS NEEDED
Status: DISCONTINUED | OUTPATIENT
Start: 2025-08-10 | End: 2025-08-10 | Stop reason: SURG

## 2025-08-10 RX ORDER — CARBOPROST TROMETHAMINE 250 UG/ML
250 INJECTION, SOLUTION INTRAMUSCULAR
Status: DISCONTINUED | OUTPATIENT
Start: 2025-08-10 | End: 2025-08-10 | Stop reason: HOSPADM

## 2025-08-10 RX ORDER — TRAMADOL HYDROCHLORIDE 50 MG/1
50 TABLET ORAL EVERY 6 HOURS PRN
Status: DISCONTINUED | OUTPATIENT
Start: 2025-08-10 | End: 2025-08-12 | Stop reason: HOSPADM

## 2025-08-10 RX ORDER — FENTANYL/ROPIVACAINE/NS/PF 2MCG/ML-.2
10 PLASTIC BAG, INJECTION (ML) INJECTION CONTINUOUS
Refills: 0 | Status: DISCONTINUED | OUTPATIENT
Start: 2025-08-10 | End: 2025-08-12 | Stop reason: HOSPADM

## 2025-08-10 RX ORDER — SODIUM CHLORIDE 0.9 % (FLUSH) 0.9 %
10 SYRINGE (ML) INJECTION AS NEEDED
Status: DISCONTINUED | OUTPATIENT
Start: 2025-08-10 | End: 2025-08-10 | Stop reason: HOSPADM

## 2025-08-10 RX ORDER — OXYTOCIN/0.9 % SODIUM CHLORIDE 30/500 ML
250 PLASTIC BAG, INJECTION (ML) INTRAVENOUS CONTINUOUS
Status: ACTIVE | OUTPATIENT
Start: 2025-08-10 | End: 2025-08-10

## 2025-08-10 RX ORDER — SODIUM CHLORIDE 0.9 % (FLUSH) 0.9 %
1-10 SYRINGE (ML) INJECTION AS NEEDED
Status: DISCONTINUED | OUTPATIENT
Start: 2025-08-10 | End: 2025-08-12 | Stop reason: HOSPADM

## 2025-08-10 RX ORDER — OXYTOCIN/0.9 % SODIUM CHLORIDE 30/500 ML
2-20 PLASTIC BAG, INJECTION (ML) INTRAVENOUS
Status: DISCONTINUED | OUTPATIENT
Start: 2025-08-10 | End: 2025-08-12 | Stop reason: HOSPADM

## 2025-08-10 RX ORDER — SODIUM CHLORIDE, SODIUM LACTATE, POTASSIUM CHLORIDE, CALCIUM CHLORIDE 600; 310; 30; 20 MG/100ML; MG/100ML; MG/100ML; MG/100ML
125 INJECTION, SOLUTION INTRAVENOUS CONTINUOUS
Status: DISCONTINUED | OUTPATIENT
Start: 2025-08-10 | End: 2025-08-12 | Stop reason: HOSPADM

## 2025-08-10 RX ORDER — ROPIVACAINE HYDROCHLORIDE 2 MG/ML
INJECTION, SOLUTION EPIDURAL; INFILTRATION; PERINEURAL AS NEEDED
Status: DISCONTINUED | OUTPATIENT
Start: 2025-08-10 | End: 2025-08-10 | Stop reason: SURG

## 2025-08-10 RX ORDER — METHYLERGONOVINE MALEATE 0.2 MG/ML
200 INJECTION INTRAVENOUS ONCE AS NEEDED
Status: DISCONTINUED | OUTPATIENT
Start: 2025-08-10 | End: 2025-08-10 | Stop reason: HOSPADM

## 2025-08-10 RX ORDER — ACETAMINOPHEN 325 MG/1
650 TABLET ORAL EVERY 4 HOURS PRN
Status: DISCONTINUED | OUTPATIENT
Start: 2025-08-10 | End: 2025-08-10 | Stop reason: HOSPADM

## 2025-08-10 RX ORDER — SODIUM CHLORIDE 0.9 % (FLUSH) 0.9 %
10 SYRINGE (ML) INJECTION EVERY 12 HOURS SCHEDULED
Status: DISCONTINUED | OUTPATIENT
Start: 2025-08-10 | End: 2025-08-10 | Stop reason: HOSPADM

## 2025-08-10 RX ORDER — TRANEXAMIC ACID 10 MG/ML
1000 INJECTION, SOLUTION INTRAVENOUS ONCE AS NEEDED
Status: DISCONTINUED | OUTPATIENT
Start: 2025-08-10 | End: 2025-08-12 | Stop reason: HOSPADM

## 2025-08-10 RX ORDER — IBUPROFEN 600 MG/1
600 TABLET, FILM COATED ORAL EVERY 6 HOURS PRN
Status: DISCONTINUED | OUTPATIENT
Start: 2025-08-10 | End: 2025-08-12 | Stop reason: HOSPADM

## 2025-08-10 RX ORDER — BISACODYL 10 MG
10 SUPPOSITORY, RECTAL RECTAL DAILY PRN
Status: DISCONTINUED | OUTPATIENT
Start: 2025-08-11 | End: 2025-08-12 | Stop reason: HOSPADM

## 2025-08-10 RX ORDER — LIDOCAINE HYDROCHLORIDE 10 MG/ML
0.5 INJECTION, SOLUTION INFILTRATION; PERINEURAL ONCE AS NEEDED
Status: DISCONTINUED | OUTPATIENT
Start: 2025-08-10 | End: 2025-08-10 | Stop reason: HOSPADM

## 2025-08-10 RX ORDER — OXYTOCIN/0.9 % SODIUM CHLORIDE 30/500 ML
125 PLASTIC BAG, INJECTION (ML) INTRAVENOUS ONCE AS NEEDED
Status: COMPLETED | OUTPATIENT
Start: 2025-08-10 | End: 2025-08-10

## 2025-08-10 RX ORDER — ACETAMINOPHEN 325 MG/1
650 TABLET ORAL EVERY 6 HOURS PRN
Status: DISCONTINUED | OUTPATIENT
Start: 2025-08-10 | End: 2025-08-12 | Stop reason: HOSPADM

## 2025-08-10 RX ORDER — FAMOTIDINE 20 MG/1
20 TABLET, FILM COATED ORAL 2 TIMES DAILY PRN
Status: DISCONTINUED | OUTPATIENT
Start: 2025-08-10 | End: 2025-08-10 | Stop reason: HOSPADM

## 2025-08-10 RX ORDER — MAGNESIUM CARB/ALUMINUM HYDROX 105-160MG
30 TABLET,CHEWABLE ORAL ONCE AS NEEDED
Status: DISCONTINUED | OUTPATIENT
Start: 2025-08-10 | End: 2025-08-10 | Stop reason: HOSPADM

## 2025-08-10 RX ORDER — PRENATAL VIT/IRON FUM/FOLIC AC 27MG-0.8MG
1 TABLET ORAL DAILY
Status: DISCONTINUED | OUTPATIENT
Start: 2025-08-11 | End: 2025-08-12 | Stop reason: HOSPADM

## 2025-08-10 RX ORDER — HYDROXYZINE HYDROCHLORIDE 50 MG/1
50 TABLET, FILM COATED ORAL NIGHTLY PRN
Status: DISCONTINUED | OUTPATIENT
Start: 2025-08-10 | End: 2025-08-12 | Stop reason: HOSPADM

## 2025-08-10 RX ORDER — MISOPROSTOL 200 UG/1
800 TABLET ORAL ONCE AS NEEDED
Status: DISCONTINUED | OUTPATIENT
Start: 2025-08-10 | End: 2025-08-10 | Stop reason: HOSPADM

## 2025-08-10 RX ADMIN — SODIUM CHLORIDE, POTASSIUM CHLORIDE, SODIUM LACTATE AND CALCIUM CHLORIDE 125 ML/HR: 600; 310; 30; 20 INJECTION, SOLUTION INTRAVENOUS at 11:49

## 2025-08-10 RX ADMIN — Medication 2 MILLI-UNITS/MIN: at 12:36

## 2025-08-10 RX ADMIN — ROPIVACAINE HYDROCHLORIDE 5 ML: 2 INJECTION, SOLUTION EPIDURAL; INFILTRATION at 19:00

## 2025-08-10 RX ADMIN — ONDANSETRON 4 MG: 2 INJECTION INTRAMUSCULAR; INTRAVENOUS at 19:11

## 2025-08-10 RX ADMIN — ROPIVACAINE HYDROCHLORIDE 6 ML: 2 INJECTION, SOLUTION EPIDURAL; INFILTRATION at 18:05

## 2025-08-10 RX ADMIN — BUTORPHANOL TARTRATE 2 MG: 2 INJECTION, SOLUTION INTRAMUSCULAR; INTRAVENOUS at 15:50

## 2025-08-10 RX ADMIN — Medication 125 ML/HR: at 21:02

## 2025-08-10 RX ADMIN — IBUPROFEN 600 MG: 600 TABLET, FILM COATED ORAL at 23:47

## 2025-08-10 RX ADMIN — Medication 10 ML/HR: at 18:05

## 2025-08-10 RX ADMIN — LIDOCAINE HYDROCHLORIDE 5 ML: 15 INJECTION, SOLUTION EPIDURAL; INFILTRATION; INTRACAUDAL; PERINEURAL at 19:03

## 2025-08-10 RX ADMIN — LIDOCAINE HYDROCHLORIDE 10 ML: 15 INJECTION, SOLUTION EPIDURAL; INFILTRATION; INTRACAUDAL; PERINEURAL at 19:01

## 2025-08-10 RX ADMIN — SODIUM CHLORIDE, POTASSIUM CHLORIDE, SODIUM LACTATE AND CALCIUM CHLORIDE 999 ML/HR: 600; 310; 30; 20 INJECTION, SOLUTION INTRAVENOUS at 17:52

## 2025-08-11 LAB
BASOPHILS # BLD AUTO: 0.03 10*3/MM3 (ref 0–0.2)
BASOPHILS NFR BLD AUTO: 0.3 % (ref 0–1.5)
DEPRECATED RDW RBC AUTO: 62.4 FL (ref 37–54)
EOSINOPHIL # BLD AUTO: 0.15 10*3/MM3 (ref 0–0.4)
EOSINOPHIL NFR BLD AUTO: 1.5 % (ref 0.3–6.2)
ERYTHROCYTE [DISTWIDTH] IN BLOOD BY AUTOMATED COUNT: 22.6 % (ref 12.3–15.4)
HCT VFR BLD AUTO: 34.1 % (ref 34–46.6)
HGB BLD-MCNC: 11.2 G/DL (ref 12–15.9)
IMM GRANULOCYTES # BLD AUTO: 0.12 10*3/MM3 (ref 0–0.05)
IMM GRANULOCYTES NFR BLD AUTO: 1.2 % (ref 0–0.5)
LYMPHOCYTES # BLD AUTO: 0.91 10*3/MM3 (ref 0.7–3.1)
LYMPHOCYTES NFR BLD AUTO: 9.2 % (ref 19.6–45.3)
MCH RBC QN AUTO: 25.9 PG (ref 26.6–33)
MCHC RBC AUTO-ENTMCNC: 32.8 G/DL (ref 31.5–35.7)
MCV RBC AUTO: 78.8 FL (ref 79–97)
MONOCYTES # BLD AUTO: 0.69 10*3/MM3 (ref 0.1–0.9)
MONOCYTES NFR BLD AUTO: 7 % (ref 5–12)
NEUTROPHILS NFR BLD AUTO: 7.97 10*3/MM3 (ref 1.7–7)
NEUTROPHILS NFR BLD AUTO: 80.8 % (ref 42.7–76)
NRBC BLD AUTO-RTO: 0 /100 WBC (ref 0–0.2)
PLATELET # BLD AUTO: 212 10*3/MM3 (ref 140–450)
PMV BLD AUTO: 9.7 FL (ref 6–12)
RBC # BLD AUTO: 4.33 10*6/MM3 (ref 3.77–5.28)
WBC NRBC COR # BLD AUTO: 9.87 10*3/MM3 (ref 3.4–10.8)

## 2025-08-11 PROCEDURE — 63710000001 DIPHENHYDRAMINE PER 50 MG: Performed by: OBSTETRICS & GYNECOLOGY

## 2025-08-11 PROCEDURE — 0503F POSTPARTUM CARE VISIT: CPT | Performed by: NURSE PRACTITIONER

## 2025-08-11 PROCEDURE — 85025 COMPLETE CBC W/AUTO DIFF WBC: CPT | Performed by: OBSTETRICS & GYNECOLOGY

## 2025-08-11 RX ORDER — DIPHENHYDRAMINE HCL 25 MG
25 CAPSULE ORAL EVERY 6 HOURS PRN
Status: DISCONTINUED | OUTPATIENT
Start: 2025-08-11 | End: 2025-08-12 | Stop reason: HOSPADM

## 2025-08-11 RX ORDER — HYDROCORTISONE 25 MG/G
1 CREAM TOPICAL EVERY 12 HOURS SCHEDULED
Status: DISCONTINUED | OUTPATIENT
Start: 2025-08-11 | End: 2025-08-12 | Stop reason: HOSPADM

## 2025-08-11 RX ADMIN — TRAMADOL HYDROCHLORIDE 50 MG: 50 TABLET, COATED ORAL at 20:59

## 2025-08-11 RX ADMIN — IBUPROFEN 600 MG: 600 TABLET, FILM COATED ORAL at 14:50

## 2025-08-11 RX ADMIN — TRAMADOL HYDROCHLORIDE 50 MG: 50 TABLET, COATED ORAL at 11:41

## 2025-08-11 RX ADMIN — PRENATAL VITAMINS-IRON FUMARATE 27 MG IRON-FOLIC ACID 0.8 MG TABLET 1 TABLET: at 08:09

## 2025-08-11 RX ADMIN — DIPHENHYDRAMINE HYDROCHLORIDE 25 MG: 25 CAPSULE ORAL at 22:40

## 2025-08-11 RX ADMIN — DOCUSATE SODIUM 100 MG: 100 CAPSULE, LIQUID FILLED ORAL at 20:59

## 2025-08-11 RX ADMIN — IBUPROFEN 600 MG: 600 TABLET, FILM COATED ORAL at 08:09

## 2025-08-11 RX ADMIN — ACETAMINOPHEN 650 MG: 325 TABLET ORAL at 11:41

## 2025-08-12 VITALS
OXYGEN SATURATION: 100 % | TEMPERATURE: 98 F | SYSTOLIC BLOOD PRESSURE: 114 MMHG | HEART RATE: 82 BPM | DIASTOLIC BLOOD PRESSURE: 69 MMHG | RESPIRATION RATE: 18 BRPM | HEIGHT: 68 IN | BODY MASS INDEX: 33.15 KG/M2

## 2025-08-12 PROCEDURE — 25010000002 MEDROXYPROGESTERONE 150 MG/ML SUSPENSION: Performed by: NURSE PRACTITIONER

## 2025-08-12 PROCEDURE — 0503F POSTPARTUM CARE VISIT: CPT | Performed by: NURSE PRACTITIONER

## 2025-08-12 RX ORDER — IBUPROFEN 600 MG/1
600 TABLET, FILM COATED ORAL EVERY 8 HOURS PRN
Qty: 90 TABLET | Refills: 1 | Status: SHIPPED | OUTPATIENT
Start: 2025-08-12

## 2025-08-12 RX ORDER — MEDROXYPROGESTERONE ACETATE 150 MG/ML
150 INJECTION, SUSPENSION INTRAMUSCULAR ONCE
Status: COMPLETED | OUTPATIENT
Start: 2025-08-12 | End: 2025-08-12

## 2025-08-12 RX ORDER — TRAMADOL HYDROCHLORIDE 50 MG/1
50 TABLET ORAL EVERY 12 HOURS PRN
Qty: 9 TABLET | Refills: 0 | Status: SHIPPED | OUTPATIENT
Start: 2025-08-12

## 2025-08-12 RX ADMIN — IBUPROFEN 600 MG: 600 TABLET, FILM COATED ORAL at 03:33

## 2025-08-12 RX ADMIN — MEDROXYPROGESTERONE ACETATE 150 MG: 150 INJECTION, SUSPENSION, EXTENDED RELEASE INTRAMUSCULAR at 10:39

## 2025-08-12 RX ADMIN — PRENATAL VITAMINS-IRON FUMARATE 27 MG IRON-FOLIC ACID 0.8 MG TABLET 1 TABLET: at 08:23

## 2025-08-12 RX ADMIN — DOCUSATE SODIUM 100 MG: 100 CAPSULE, LIQUID FILLED ORAL at 08:23

## 2025-08-12 RX ADMIN — HYDROCORTISONE 1 APPLICATION: 25 CREAM TOPICAL at 03:34

## 2025-08-13 ENCOUNTER — MATERNAL SCREENING (OUTPATIENT)
Dept: CALL CENTER | Facility: HOSPITAL | Age: 25
End: 2025-08-13
Payer: COMMERCIAL

## 2025-08-19 ENCOUNTER — PATIENT MESSAGE (OUTPATIENT)
Dept: OBSTETRICS AND GYNECOLOGY | Facility: CLINIC | Age: 25
End: 2025-08-19
Payer: COMMERCIAL

## 2025-08-20 ENCOUNTER — POSTPARTUM VISIT (OUTPATIENT)
Dept: OBSTETRICS AND GYNECOLOGY | Facility: CLINIC | Age: 25
End: 2025-08-20
Payer: COMMERCIAL

## 2025-08-20 VITALS — SYSTOLIC BLOOD PRESSURE: 112 MMHG | DIASTOLIC BLOOD PRESSURE: 72 MMHG

## 2025-08-21 ENCOUNTER — MATERNAL SCREENING (OUTPATIENT)
Dept: CALL CENTER | Facility: HOSPITAL | Age: 25
End: 2025-08-21
Payer: COMMERCIAL

## 2025-08-27 ENCOUNTER — TELEPHONE (OUTPATIENT)
Dept: NEUROLOGY | Facility: CLINIC | Age: 25
End: 2025-08-27
Payer: COMMERCIAL